# Patient Record
Sex: FEMALE | Race: WHITE | NOT HISPANIC OR LATINO | Employment: FULL TIME | URBAN - METROPOLITAN AREA
[De-identification: names, ages, dates, MRNs, and addresses within clinical notes are randomized per-mention and may not be internally consistent; named-entity substitution may affect disease eponyms.]

---

## 2017-02-15 ENCOUNTER — TELEPHONE (OUTPATIENT)
Dept: FAMILY MEDICINE | Facility: CLINIC | Age: 57
End: 2017-02-15

## 2017-02-15 DIAGNOSIS — Z00.00 PREVENTATIVE HEALTH CARE: ICD-10-CM

## 2017-02-15 DIAGNOSIS — Z12.31 ENCOUNTER FOR SCREENING MAMMOGRAM FOR MALIGNANT NEOPLASM OF BREAST: Primary | ICD-10-CM

## 2017-02-15 NOTE — TELEPHONE ENCOUNTER
----- Message from Lawanda Gonzalez sent at 2/15/2017  9:34 AM CST -----  Contact: Patient   Patient is coming in on 3/13/17 for her Annual and she would like to have orders in for a Mammogram and labs to be done before her appointment. Please call her at [phone].      Thanks  Td

## 2017-02-20 ENCOUNTER — LAB VISIT (OUTPATIENT)
Dept: LAB | Facility: HOSPITAL | Age: 57
End: 2017-02-20
Attending: FAMILY MEDICINE
Payer: COMMERCIAL

## 2017-02-20 ENCOUNTER — OFFICE VISIT (OUTPATIENT)
Dept: FAMILY MEDICINE | Facility: CLINIC | Age: 57
End: 2017-02-20
Payer: COMMERCIAL

## 2017-02-20 VITALS
TEMPERATURE: 97 F | DIASTOLIC BLOOD PRESSURE: 74 MMHG | RESPIRATION RATE: 18 BRPM | BODY MASS INDEX: 29.58 KG/M2 | OXYGEN SATURATION: 98 % | SYSTOLIC BLOOD PRESSURE: 120 MMHG | HEART RATE: 78 BPM | WEIGHT: 184.06 LBS | HEIGHT: 66 IN

## 2017-02-20 DIAGNOSIS — E78.2 MIXED HYPERLIPIDEMIA: ICD-10-CM

## 2017-02-20 DIAGNOSIS — F31.70 BIPOLAR DISORDER IN FULL REMISSION, MOST RECENT EPISODE UNSPECIFIED TYPE: ICD-10-CM

## 2017-02-20 DIAGNOSIS — Z12.4 CERVICAL CANCER SCREENING: ICD-10-CM

## 2017-02-20 DIAGNOSIS — Z00.00 PREVENTATIVE HEALTH CARE: Primary | ICD-10-CM

## 2017-02-20 DIAGNOSIS — Z00.00 PREVENTATIVE HEALTH CARE: ICD-10-CM

## 2017-02-20 DIAGNOSIS — K21.9 GASTROESOPHAGEAL REFLUX DISEASE, ESOPHAGITIS PRESENCE NOT SPECIFIED: ICD-10-CM

## 2017-02-20 DIAGNOSIS — Z23 NEEDS FLU SHOT: ICD-10-CM

## 2017-02-20 DIAGNOSIS — F41.1 GENERALIZED ANXIETY DISORDER: ICD-10-CM

## 2017-02-20 LAB
ALBUMIN SERPL BCP-MCNC: 3.8 G/DL
ALP SERPL-CCNC: 93 U/L
ALT SERPL W/O P-5'-P-CCNC: 20 U/L
ANION GAP SERPL CALC-SCNC: 8 MMOL/L
AST SERPL-CCNC: 26 U/L
BASOPHILS # BLD AUTO: 0.03 K/UL
BASOPHILS NFR BLD: 0.6 %
BILIRUB SERPL-MCNC: 0.4 MG/DL
BUN SERPL-MCNC: 9 MG/DL
CALCIUM SERPL-MCNC: 9.3 MG/DL
CHLORIDE SERPL-SCNC: 107 MMOL/L
CHOLEST/HDLC SERPL: 4.8 {RATIO}
CO2 SERPL-SCNC: 27 MMOL/L
CREAT SERPL-MCNC: 0.8 MG/DL
DIFFERENTIAL METHOD: ABNORMAL
EOSINOPHIL # BLD AUTO: 0.1 K/UL
EOSINOPHIL NFR BLD: 2.7 %
ERYTHROCYTE [DISTWIDTH] IN BLOOD BY AUTOMATED COUNT: 15.7 %
EST. GFR  (AFRICAN AMERICAN): >60 ML/MIN/1.73 M^2
EST. GFR  (NON AFRICAN AMERICAN): >60 ML/MIN/1.73 M^2
GLUCOSE SERPL-MCNC: 75 MG/DL
HCT VFR BLD AUTO: 38.6 %
HDL/CHOLESTEROL RATIO: 21 %
HDLC SERPL-MCNC: 224 MG/DL
HDLC SERPL-MCNC: 47 MG/DL
HGB BLD-MCNC: 12.4 G/DL
LDLC SERPL CALC-MCNC: 158.8 MG/DL
LYMPHOCYTES # BLD AUTO: 1.5 K/UL
LYMPHOCYTES NFR BLD: 31.8 %
MCH RBC QN AUTO: 28.1 PG
MCHC RBC AUTO-ENTMCNC: 32.1 %
MCV RBC AUTO: 88 FL
MONOCYTES # BLD AUTO: 0.4 K/UL
MONOCYTES NFR BLD: 7.3 %
NEUTROPHILS # BLD AUTO: 2.7 K/UL
NEUTROPHILS NFR BLD: 57.4 %
NONHDLC SERPL-MCNC: 177 MG/DL
PLATELET # BLD AUTO: 371 K/UL
PMV BLD AUTO: 10.2 FL
POTASSIUM SERPL-SCNC: 4 MMOL/L
PROT SERPL-MCNC: 7.5 G/DL
RBC # BLD AUTO: 4.41 M/UL
SODIUM SERPL-SCNC: 142 MMOL/L
TRIGL SERPL-MCNC: 91 MG/DL
TSH SERPL DL<=0.005 MIU/L-ACNC: 3.09 UIU/ML
WBC # BLD AUTO: 4.78 K/UL

## 2017-02-20 PROCEDURE — 90688 IIV4 VACCINE SPLT 0.5 ML IM: CPT | Mod: S$GLB,,, | Performed by: FAMILY MEDICINE

## 2017-02-20 PROCEDURE — 80061 LIPID PANEL: CPT

## 2017-02-20 PROCEDURE — 99999 PR PBB SHADOW E&M-EST. PATIENT-LVL IV: CPT | Mod: PBBFAC,,, | Performed by: FAMILY MEDICINE

## 2017-02-20 PROCEDURE — 88175 CYTOPATH C/V AUTO FLUID REDO: CPT

## 2017-02-20 PROCEDURE — 99396 PREV VISIT EST AGE 40-64: CPT | Mod: 25,S$GLB,, | Performed by: FAMILY MEDICINE

## 2017-02-20 PROCEDURE — 84443 ASSAY THYROID STIM HORMONE: CPT

## 2017-02-20 PROCEDURE — 36415 COLL VENOUS BLD VENIPUNCTURE: CPT | Mod: PO

## 2017-02-20 PROCEDURE — 80053 COMPREHEN METABOLIC PANEL: CPT

## 2017-02-20 PROCEDURE — 90471 IMMUNIZATION ADMIN: CPT | Mod: S$GLB,,, | Performed by: FAMILY MEDICINE

## 2017-02-20 PROCEDURE — 85025 COMPLETE CBC W/AUTO DIFF WBC: CPT

## 2017-02-20 NOTE — MR AVS SNAPSHOT
[facility]  [address]  [place] [address]  Phone: [phone]                  June Flor Rodney   2017 8:00 AM   Office Visit    Description:  Female : 1960   Provider:  June Kelley MD   Department:  [facility]           Reason for Visit     Annual Exam           Diagnoses this Visit        Comments    Preventative health care    -  Primary     Mixed hyperlipidemia         Gastroesophageal reflux disease, esophagitis presence not specified         Generalized anxiety disorder         Bipolar disorder in full remission, most recent episode unspecified type         Cervical cancer screening                To Do List           Future Appointments        Provider Department Dept Phone    2017 9:00 AM LABORATORY, [address] [facility] [phone]    3/6/2017 1:00 PM ON MAMMO1 [facility] [phone]      Goals (5 Years of Data)     None      [facility]     [facility] Care [facility] -  Assistance  Registered nurses in the [facility] provide clinical advisement, health education, appointment booking, and other advisory services.  Call for this free service at [phone].             Medications           Message regarding Medications     Verify the changes and/or additions to your medication regime listed below are the same as discussed with your clinician today.  If any of these changes or additions are incorrect, please notify your healthcare provider.        STOP taking these medications     meloxicam (MOBIC) 15 MG tablet Take 1 tablet by mouth once daily.           Verify that the below list of medications is an accurate representation of the medications you are currently taking.  If none reported, the list may be blank. If incorrect, please contact your healthcare provider. Carry this list with you in case of emergency.           Current Medications      "desonide (DESOWEN) 0.05 % lotion Apply topically 2 (two) times daily.    fluoxetine (PROZAC) 20 MG capsule Take 20 mg by mouth every morning.    lamotrigine (LAMICTAL) 150 MG Tab     pantoprazole (PROTONIX) 40 MG tablet take 1 tablet by mouth every morning AT LEAST 30 MINUTES PRIOR TO EATING MEAL    quetiapine (SEROQUEL) 25 MG Tab            Clinical Reference Information           Your Vitals Were     BP Pulse Temp Resp Height Weight    120/74 78 97.1 °F (36.2 °C) (Tympanic) 18 5' 6" (1.676 m) 83.5 kg (184 lb 1.4 oz)    Last Period SpO2 BMI          07/29/2010 (Approximate) 98% 29.71 kg/m2        Blood Pressure          Most Recent Value    BP  120/74      Allergies as of 2/20/2017     Demerol  [Meperidine]      Immunizations Administered on Date of Encounter - 2/20/2017     None      Orders Placed During Today's Visit      Normal Orders This Visit    Liquid-based pap smear, screening       Language Assistance Services     ATTENTION: Language assistance services are available, free of charge. Please call [phone].      ATENCIÓN: Si habla silvana, tiene a rodriguez disposición servicios gratuitos de asistencia lingüística. Llame al [phone].     RENEA Ý: N?u b?n nói Ti?ng Vi?t, có các d?ch v? h? tr? ngôn ng? mi?n phí dành cho b?n. G?i s? [phone].         [facility] complies with applicable Federal civil rights laws and does not discriminate on the basis of race, color, national origin, age, disability, or sex.        "

## 2017-02-20 NOTE — PROGRESS NOTES
CHIEF COMPLAINT: This is a 57-year-old female here for preventive health exam.    SUBJECTIVE: The patient is doing well without complaints.  She's lost 30 pounds over the last year through a health program associated with her place of employment.  She is exercising regularly.  Patient takes pantoprazole for GERD and Seroquel, Lamictal and Prozac for bipolar disorder.    Eye exam August 2015. Mammogram November 2015. Pap smear September 2014, due again in September 2017. Colonoscopy August 2011, due again in August 2021.  Tdap October 2015.     ROS:  GENERAL: Patient denies fever, chills, night sweats. Patient denies weight gain. Patient denies anorexia, fatigue, weakness or swollen glands.  SKIN: Patient denies rash or hair loss.  HEENT: Patient denies sore throat, ear pain, hearing loss, nasal congestion, or runny nose. Patient denies visual disturbance, eye irritation or discharge.  LUNGS: Patient denies wheeze or hemoptysis.  CARDIOVASCULAR: Patient denies chest pain, shortness of breath, palpitations, syncope or lower extremity edema.  GI: Patient denies abdominal pain, nausea, vomiting, diarrhea, constipation, blood in stool or melena.  GENITOURINARY: Patient denies pelvic pain, vaginal discharge, itch or odor. Patient denies irregular vaginal bleeding. Patient denies dysuria, frequency, hematuria, nocturia, urgency or incontinence.  BREASTS: Patient denies breast pain, mass or nipple discharge.  MUSCULOSKELETAL: Patient denies joint pain, swelling, redness or warmth.  NEUROLOGIC: Patient denies headache, vertigo, paresthesias, weakness in limb, dysarthria, dysphagia or abnormality of gait.  PSYCHIATRIC: Patient denies anxiety, depression, or memory loss.     OBJECTIVE:   GENERAL: Well-developed well-nourished obese white female alert and oriented x3, in no acute distress. Memory, judgment and cognition without deficit.  Weight loss of 30 pounds in the last year.  SKIN: Clear without rash. Normal color and  tone.  HEENT: Eyes: Clear conjunctivae. No scleral icterus. Pupils equal reactive to light and accommodation. Ears: Clear canals. Clear TMs. Nose: Without congestion. Pharynx: Without injection or exudates.  NECK: Supple, normal range of motion. No masses, lymphadenopathy or enlarged thyroid. No JVD. Carotids 2+ and equal. No bruits.  LUNGS: Clear to auscultation. Normal respiratory effort.  CARDIOVASCULAR: Regular rhythm, normal S1, S2 without murmur, gallop or rub.  BACK: No CVA or spinal tenderness.  BREASTS: No masses or nipple discharge. Tenderness to palpation right greater than left breast.  ABDOMEN: Normal appearance. Active bowel sounds. Soft, nontender without mass or organomegaly. No rebound or guarding.  EXTREMITIES: Without cyanosis, clubbing or edema. Distal pulses 2+ and equal. Normal range of motion in all extremities. No joint effusion, erythema or warmth.  NEUROLOGIC: Cranial nerves II through XII without deficit. Motor strength equal bilaterally. Sensation normal to touch. Deep tendon reflexes 2+ and equal. Gait without abnormality. No tremor. Negative cerebellar signs.  PELVIC: External: Without lesions or inflammation. Vaginal: Clear. Cervix: Normal appearance, nontender.  Pap ×2. Uterus: Normal size and shape. Adnexa: Non-tender, without masses. Rectovaginal: Confirms, heme-negative stool x2.    ASSESSMENT:  1. Preventative health care    2. Mixed hyperlipidemia    3. Gastroesophageal reflux disease, esophagitis presence not specified    4. Generalized anxiety disorder    5. Bipolar disorder in full remission, most recent episode unspecified type    6. Cervical cancer screening      PLAN:   1.  Weight reduction.  Exercise regularly.  2.  Age-appropriate counseling.  3.  Fasting lab.  4.  Mammogram.  5.  Refill medications as needed.  6.  Influenza vaccine.

## 2017-03-06 ENCOUNTER — HOSPITAL ENCOUNTER (OUTPATIENT)
Dept: RADIOLOGY | Facility: HOSPITAL | Age: 57
Discharge: HOME | End: 2017-03-06
Attending: FAMILY MEDICINE
Payer: COMMERCIAL

## 2017-03-06 DIAGNOSIS — Z12.31 ENCOUNTER FOR SCREENING MAMMOGRAM FOR MALIGNANT NEOPLASM OF BREAST: ICD-10-CM

## 2017-03-06 PROCEDURE — 77063 BREAST TOMOSYNTHESIS BI: CPT | Mod: 26,,, | Performed by: RADIOLOGY

## 2017-03-06 PROCEDURE — 77067 SCR MAMMO BI INCL CAD: CPT | Mod: TC

## 2017-03-06 PROCEDURE — 77067 SCR MAMMO BI INCL CAD: CPT | Mod: 26,,, | Performed by: RADIOLOGY

## 2017-06-13 ENCOUNTER — TELEPHONE (OUTPATIENT)
Dept: FAMILY MEDICINE | Facility: CLINIC | Age: 57
End: 2017-06-13

## 2017-06-13 DIAGNOSIS — H35.30 MACULAR DEGENERATION: Primary | ICD-10-CM

## 2017-06-13 NOTE — TELEPHONE ENCOUNTER
----- Message from Richy Peoples sent at 6/13/2017  8:21 AM CDT -----  Contact: pt  She's calling in regards to a referral to see the ophthalmologist, please advise, [phone] (home)

## 2017-06-19 ENCOUNTER — OFFICE VISIT (OUTPATIENT)
Dept: OPHTHALMOLOGY | Facility: CLINIC | Age: 57
End: 2017-06-19
Payer: COMMERCIAL

## 2017-06-19 DIAGNOSIS — H52.4 BILATERAL PRESBYOPIA: ICD-10-CM

## 2017-06-19 DIAGNOSIS — Z98.890 S/P LASIK (LASER ASSISTED IN SITU KERATOMILEUSIS) OF BOTH EYES: ICD-10-CM

## 2017-06-19 PROCEDURE — 92015 DETERMINE REFRACTIVE STATE: CPT | Mod: S$GLB,,, | Performed by: OPTOMETRIST

## 2017-06-19 PROCEDURE — 92014 COMPRE OPH EXAM EST PT 1/>: CPT | Mod: S$GLB,,, | Performed by: OPTOMETRIST

## 2017-06-19 PROCEDURE — 99999 PR PBB SHADOW E&M-EST. PATIENT-LVL I: CPT | Mod: PBBFAC,,, | Performed by: OPTOMETRIST

## 2017-06-19 NOTE — LETTER
June 19, 2017      June Kelley MD  [address]  [facility] [address]           [facility]  [address] [place] [address]  Phone: [phone]  Fax: [phone]          Patient: June Stevens   MR Number: [id number]   YOB: 1960   Date of Visit: 6/19/2017       Dear Dr. June Kelley:    Thank you for referring June Stevens to me for evaluation. Attached you will find relevant portions of my assessment and plan of care.    If you have questions, please do not hesitate to call me. I look forward to following June Stevens along with you.    Sincerely,    Ruben Han, OD    Enclosure  CC:  No Recipients    If you would like to receive this communication electronically, please contact [email or web address] or [phone] to request more information on [employer] Link access.    For providers and/or their staff who would like to refer a patient to [facility], please contact us through our one-stop-shop provider referral line, Tono Mccarty, at [phone].    If you feel you have received this communication in error or would no longer like to receive these types of communications, please e-mail [email or web address]

## 2017-06-19 NOTE — PROGRESS NOTES
HPI     Decrease distance visual acuity. Patient wears OTC readers. Last eye exam   08/03/2015 TRF. Update glasses RX.    Last edited by María Ya on 6/19/2017  1:53 PM. (History)            Assessment /Plan     For exam results, see Encounter Report.    Nuclear cataract, bilateral    S/P LASIK (laser assisted in situ keratomileusis) of both eyes    Bilateral presbyopia      Minimal cataracts OU, not surgical    Stable LASIK, some Rx regression OU    Dispense Final Rx for glasses.  RTC 1 year

## 2017-07-24 RX ORDER — PANTOPRAZOLE SODIUM 40 MG/1
TABLET, DELAYED RELEASE ORAL
Qty: 30 TABLET | Refills: 5 | Status: SHIPPED | OUTPATIENT
Start: 2017-07-24 | End: 2018-01-08 | Stop reason: SDUPTHER

## 2017-10-02 ENCOUNTER — IMMUNIZATION (OUTPATIENT)
Dept: FAMILY MEDICINE | Facility: CLINIC | Age: 57
End: 2017-10-02
Payer: COMMERCIAL

## 2017-10-02 ENCOUNTER — TELEPHONE (OUTPATIENT)
Dept: FAMILY MEDICINE | Facility: CLINIC | Age: 57
End: 2017-10-02

## 2017-10-02 PROCEDURE — 90471 IMMUNIZATION ADMIN: CPT | Mod: S$GLB,,, | Performed by: FAMILY MEDICINE

## 2017-10-02 PROCEDURE — 99999 PR PBB SHADOW E&M-EST. PATIENT-LVL I: CPT | Mod: PBBFAC,,,

## 2017-10-02 PROCEDURE — 90686 IIV4 VACC NO PRSV 0.5 ML IM: CPT | Mod: S$GLB,,, | Performed by: FAMILY MEDICINE

## 2017-11-16 ENCOUNTER — TELEPHONE (OUTPATIENT)
Dept: FAMILY MEDICINE | Facility: CLINIC | Age: 57
End: 2017-11-16

## 2017-11-16 ENCOUNTER — OFFICE VISIT (OUTPATIENT)
Dept: FAMILY MEDICINE | Facility: CLINIC | Age: 57
End: 2017-11-16
Payer: COMMERCIAL

## 2017-11-16 VITALS
HEIGHT: 66 IN | WEIGHT: 191.81 LBS | TEMPERATURE: 97 F | BODY MASS INDEX: 30.82 KG/M2 | DIASTOLIC BLOOD PRESSURE: 80 MMHG | RESPIRATION RATE: 18 BRPM | OXYGEN SATURATION: 99 % | SYSTOLIC BLOOD PRESSURE: 110 MMHG | HEART RATE: 79 BPM

## 2017-11-16 DIAGNOSIS — J06.9 ACUTE URI: Primary | ICD-10-CM

## 2017-11-16 PROCEDURE — 99999 PR PBB SHADOW E&M-EST. PATIENT-LVL III: CPT | Mod: PBBFAC,,, | Performed by: FAMILY MEDICINE

## 2017-11-16 PROCEDURE — 99213 OFFICE O/P EST LOW 20 MIN: CPT | Mod: S$GLB,,, | Performed by: FAMILY MEDICINE

## 2017-11-16 RX ORDER — LURASIDONE HYDROCHLORIDE 40 MG/1
40 TABLET, FILM COATED ORAL NIGHTLY
Refills: 0 | COMMUNITY
Start: 2017-10-17 | End: 2018-05-23

## 2017-11-16 RX ORDER — METHYLPREDNISOLONE 4 MG/1
TABLET ORAL
Qty: 1 PACKAGE | Refills: 0 | Status: SHIPPED | OUTPATIENT
Start: 2017-11-16 | End: 2017-11-30

## 2017-11-16 NOTE — PROGRESS NOTES
CHIEF COMPLAINT: This is a 57-year-old female complaining of respiratory illness.      SUBJECTIVE: Patient complains of a 5 day history of nasal congestion, sore throat, postnasal drip and dry cough.  She complains of fatigue, weakness and anorexia.  She denies chest congestion, shortness breath or wheezing.  Patient felt that she initially had a fever, but did not take her temperature.  Positive ill contacts.  She's been taking NyQuil and Mucinex.    ROS:  GENERAL: Patient denies chills, night sweats.  Patient denies weight gain or loss. Patient denies anorexia, fatigue, weakness or swollen glands.  SKIN: Patient denies rash.  HEENT:.  Patient denies ear pain, hearing loss, visual disturbance, eye irritation or discharge.  LUNGS: Patient denies wheeze or hemoptysis.  CARDIOVASCULAR: Patient denies chest pain, shortness of breath, palpitations, syncope or lower extremity edema.  GI: Patient denies abdominal pain, nausea, vomiting, diarrhea, constipation, blood in stool or melena.    OBJECTIVE:   GENERAL: Well-developed well-nourished, mildly ill-appearing obese white female alert and oriented x3 in no acute distress.  Memory, judgment and cognition without deficit.  No audible wheezing.  SKIN: Clear without rash.  Normal color and tone.  HEENT: Eyes: Clear conjunctivae.  No scleral icterus.  Ears: Clear canals.  Clear TMs.  Nose: Mild bilateral congestion.  Pharynx: Without injection or exudates.  NECK: Supple, normal range of motion.  No lymphadenopathy.    LUNGS: Clear to auscultation.  Normal respiratory effort.  CARDIOVASCULAR: Regular rhythm, normal S1, S2 without murmur, gallop or rub.    ASSESSMENT:  1. Acute URI      PLAN:   1.  Medrol Dosepak.  2.  Increase fluid intake.  3.  OTC meds as needed.  4.  Follow-up if no improvement or worsening symptoms.

## 2017-11-30 ENCOUNTER — OFFICE VISIT (OUTPATIENT)
Dept: FAMILY MEDICINE | Facility: CLINIC | Age: 57
End: 2017-11-30
Payer: COMMERCIAL

## 2017-11-30 VITALS
HEIGHT: 66 IN | DIASTOLIC BLOOD PRESSURE: 82 MMHG | TEMPERATURE: 98 F | OXYGEN SATURATION: 98 % | HEART RATE: 104 BPM | RESPIRATION RATE: 18 BRPM | WEIGHT: 189.81 LBS | BODY MASS INDEX: 30.51 KG/M2 | SYSTOLIC BLOOD PRESSURE: 116 MMHG

## 2017-11-30 DIAGNOSIS — B96.89 BACTERIAL SINUSITIS: Primary | ICD-10-CM

## 2017-11-30 DIAGNOSIS — J32.9 BACTERIAL SINUSITIS: Primary | ICD-10-CM

## 2017-11-30 PROCEDURE — 99213 OFFICE O/P EST LOW 20 MIN: CPT | Mod: S$GLB,,, | Performed by: FAMILY MEDICINE

## 2017-11-30 PROCEDURE — 99999 PR PBB SHADOW E&M-EST. PATIENT-LVL III: CPT | Mod: PBBFAC,,, | Performed by: FAMILY MEDICINE

## 2017-11-30 RX ORDER — AMOXICILLIN 875 MG/1
875 TABLET, FILM COATED ORAL 2 TIMES DAILY
Qty: 20 TABLET | Refills: 0 | Status: SHIPPED | OUTPATIENT
Start: 2017-11-30 | End: 2017-12-10

## 2017-11-30 RX ORDER — PROMETHAZINE HYDROCHLORIDE AND DEXTROMETHORPHAN HYDROBROMIDE 6.25; 15 MG/5ML; MG/5ML
5 SYRUP ORAL
Qty: 180 ML | Refills: 0 | Status: SHIPPED | OUTPATIENT
Start: 2017-11-30 | End: 2017-12-10

## 2017-11-30 NOTE — LETTER
November 30, 2017      [facility]  [address] [address]  Phone: [phone]       Patient: June Stevens   YOB: 1960  Date of Visit: 11/30/2017    To Whom It May Concern:    Reta Stevens  was at [facility] on 11/30/2017. She may return to work/school on 12/4/17 with no restrictions. If you have any questions or concerns, or if I can be of further assistance, please do not hesitate to contact me.    Sincerely,    June Kelley MD

## 2017-11-30 NOTE — PROGRESS NOTES
CHIEF COMPLAINT: This is a 57-year-old female complaining of recurrent respiratory symptoms.    SUBJECTIVE: Patient was seen 2 weeks ago and treated with Medrol Dosepak.  She had no symptoms for 1 week.  Then 48 hours ago she began having cough, runny nose and sore throat.  Sore throat has resolved but she now has slight cough and frontal headache and death.  He nose.  She complains of chest congestion but denies shortness breath or wheezing.  She's been taking DayQuil and cetirizine.  She denies ill contacts.    ROS:  GENERAL: Patient denies fever, chills, night sweats.  Patient denies weight gain or loss. Patient denies anorexia, weakness or swollen glands.  Positive for fatigue.  SKIN: Patient denies rash.  HEENT: Patient denies ear pain, hearing loss, visual disturbance, eye irritation or discharge.  LUNGS: Patient denies wheeze or hemoptysis.  CARDIOVASCULAR: Patient denies chest pain, shortness of breath, palpitations, syncope or lower extremity edema.  GI: Patient denies abdominal pain, nausea, vomiting, diarrhea, constipation, blood in stool or melena.     OBJECTIVE:   GENERAL: Well-developed well-nourished, lethargic obese white female alert and oriented x3 in no acute distress.  Memory, judgment and cognition without deficit.  No audible wheezing.  SKIN: Clear without rash.  Normal color and tone.  HEENT: Eyes: Clear conjunctivae.  No scleral icterus.  Ears: Clear canals.  Clear TMs.  Nose: Mild bilateral congestion.  Pharynx: Without injection or exudates.  NECK: Supple, normal range of motion.  No lymphadenopathy.    LUNGS: Clear to auscultation.  Normal respiratory effort.  CARDIOVASCULAR: Regular rhythm, normal S1, S2 without murmur, gallop or rub.    ASSESSMENT:  Acute rhinosinusitis    PLAN:   1.  Amoxicillin 875 mg twice daily for 10 days.  2.  Phenergan DM 6 ounces.  3.  Symptomatic treatment.  4.  Increase fluid intake.  5.  Follow-up if no improvement or worsening symptoms.

## 2018-01-08 RX ORDER — PANTOPRAZOLE SODIUM 40 MG/1
TABLET, DELAYED RELEASE ORAL
Qty: 30 TABLET | Refills: 0 | Status: SHIPPED | OUTPATIENT
Start: 2018-01-08 | End: 2018-05-28 | Stop reason: SDUPTHER

## 2018-05-23 ENCOUNTER — OFFICE VISIT (OUTPATIENT)
Dept: FAMILY MEDICINE | Facility: CLINIC | Age: 58
End: 2018-05-23
Payer: COMMERCIAL

## 2018-05-23 ENCOUNTER — TELEPHONE (OUTPATIENT)
Dept: FAMILY MEDICINE | Facility: CLINIC | Age: 58
End: 2018-05-23

## 2018-05-23 VITALS
WEIGHT: 211.19 LBS | HEIGHT: 66 IN | SYSTOLIC BLOOD PRESSURE: 118 MMHG | RESPIRATION RATE: 18 BRPM | DIASTOLIC BLOOD PRESSURE: 76 MMHG | HEART RATE: 95 BPM | TEMPERATURE: 98 F | BODY MASS INDEX: 33.94 KG/M2

## 2018-05-23 DIAGNOSIS — S61.211A LACERATION OF LEFT INDEX FINGER WITHOUT DAMAGE TO NAIL, FOREIGN BODY PRESENCE UNSPECIFIED, INITIAL ENCOUNTER: Primary | ICD-10-CM

## 2018-05-23 PROCEDURE — 99213 OFFICE O/P EST LOW 20 MIN: CPT | Mod: S$GLB,,, | Performed by: REGISTERED NURSE

## 2018-05-23 PROCEDURE — 99999 PR PBB SHADOW E&M-EST. PATIENT-LVL III: CPT | Mod: PBBFAC,,, | Performed by: REGISTERED NURSE

## 2018-05-23 PROCEDURE — 3008F BODY MASS INDEX DOCD: CPT | Mod: CPTII,S$GLB,, | Performed by: REGISTERED NURSE

## 2018-05-23 NOTE — PROGRESS NOTES
"Subjective:       Patient ID: June Stevens is a 58 y.o. female.    Chief Complaint: Laceration      HPI    Mrs. Stevens is here today with reports of cutting her LT index finger with a serrated kitchen knife last night.  She was using the knife to open a package she had received in the mail.  Knife slipped, cutting the pad of the finger.  Did bleed a lot with severe pain into today.  The bleeding has stopped, finger now with mild throbbing.  She has cleaned the finger but avoids looking at wound.  She is UTD on her tetanus vaccine.      Review of Systems  See HPI.      Patient Active Problem List   Diagnosis    Bipolar disorder    Generalized anxiety disorder    Obesity    Mixed hyperlipidemia    GERD (gastroesophageal reflux disease)       Past medical, surgical, family and social histories have been reviewed today.        Objective:     Vitals:    05/23/18 1402   BP: 118/76   Pulse: 95   Resp: 18   Temp: 97.8 °F (36.6 °C)   TempSrc: Tympanic   Weight: 95.8 kg (211 lb 3.2 oz)   Height: 5' 6" (1.676 m)   PainSc: 10-Worst pain ever   PainLoc: Finger       Physical Exam   Constitutional: She is oriented to person, place, and time. She appears well-developed and well-nourished.   Musculoskeletal:        Left hand: She exhibits laceration (superficial laceration noted to the finger pad of the LT index finger//no active bleeding or drainage//area tender with mild swelling//good cap refill < 3 sec//full ROM of finger, sensation intact).        Hands:  Neurological: She is alert and oriented to person, place, and time.   Skin: Skin is warm and dry. Capillary refill takes less than 2 seconds.   Psychiatric: She has a normal mood and affect. Her behavior is normal. Judgment and thought content normal.   Vitals reviewed.        Medication List with Changes/Refills   Current Medications    FLUOXETINE (PROZAC) 20 MG CAPSULE    Take 20 mg by mouth every morning.    LAMOTRIGINE (LAMICTAL) 150 MG TAB        PANTOPRAZOLE " (PROTONIX) 40 MG TABLET    take 1 tablet by mouth every morning AT LEAST 30 MINUTES BEFORE EATING A MEAL    QUETIAPINE (SEROQUEL) 25 MG TAB       Discontinued Medications    LATUDA 40 MG TAB TABLET    Take 40 mg by mouth every evening.           Diagnosis       1. Laceration of left index finger without damage to nail, foreign body presence unspecified, initial encounter          Assessment/ Plan     Laceration of left index finger without foreign body without damage to nail, initial encounter  · PT with reports of laceration to finger, no s/s infection.  · Too late for sutures (likely not needed anyway) and steri-strips not necessary.  · Wound care discussed.  · Wound cleaned today, bandaged.  · Tetanus vaccine UTD.  · Follow-up in clinic as needed.        GUY Lyles  [facility]

## 2018-05-23 NOTE — TELEPHONE ENCOUNTER
----- Message from Daniel Molina sent at 5/23/2018 10:24 AM CDT -----  Contact: pt  Call pt at [phone] pt cut finger and wants to see if she can be worked in today

## 2018-05-28 RX ORDER — PANTOPRAZOLE SODIUM 40 MG/1
TABLET, DELAYED RELEASE ORAL
Qty: 30 TABLET | Refills: 0 | Status: SHIPPED | OUTPATIENT
Start: 2018-05-28 | End: 2018-08-13 | Stop reason: SDUPTHER

## 2018-07-12 RX ORDER — PANTOPRAZOLE SODIUM 40 MG/1
TABLET, DELAYED RELEASE ORAL
Qty: 30 TABLET | Refills: 0 | OUTPATIENT
Start: 2018-07-12

## 2018-07-20 ENCOUNTER — TELEPHONE (OUTPATIENT)
Dept: FAMILY MEDICINE | Facility: CLINIC | Age: 58
End: 2018-07-20

## 2018-08-13 ENCOUNTER — OFFICE VISIT (OUTPATIENT)
Dept: FAMILY MEDICINE | Facility: CLINIC | Age: 58
End: 2018-08-13
Payer: COMMERCIAL

## 2018-08-13 VITALS
RESPIRATION RATE: 18 BRPM | HEIGHT: 66 IN | OXYGEN SATURATION: 98 % | DIASTOLIC BLOOD PRESSURE: 80 MMHG | BODY MASS INDEX: 34.15 KG/M2 | HEART RATE: 91 BPM | TEMPERATURE: 99 F | SYSTOLIC BLOOD PRESSURE: 110 MMHG | WEIGHT: 212.5 LBS

## 2018-08-13 DIAGNOSIS — E78.2 MIXED HYPERLIPIDEMIA: ICD-10-CM

## 2018-08-13 DIAGNOSIS — Z00.00 PREVENTATIVE HEALTH CARE: Primary | ICD-10-CM

## 2018-08-13 DIAGNOSIS — F31.70 BIPOLAR DISORDER IN FULL REMISSION, MOST RECENT EPISODE UNSPECIFIED TYPE: ICD-10-CM

## 2018-08-13 DIAGNOSIS — K21.9 GASTROESOPHAGEAL REFLUX DISEASE, ESOPHAGITIS PRESENCE NOT SPECIFIED: ICD-10-CM

## 2018-08-13 DIAGNOSIS — Z12.31 ENCOUNTER FOR SCREENING MAMMOGRAM FOR MALIGNANT NEOPLASM OF BREAST: ICD-10-CM

## 2018-08-13 PROCEDURE — 99999 PR PBB SHADOW E&M-EST. PATIENT-LVL IV: CPT | Mod: PBBFAC,,, | Performed by: FAMILY MEDICINE

## 2018-08-13 PROCEDURE — 99396 PREV VISIT EST AGE 40-64: CPT | Mod: S$GLB,,, | Performed by: FAMILY MEDICINE

## 2018-08-13 RX ORDER — PANTOPRAZOLE SODIUM 40 MG/1
TABLET, DELAYED RELEASE ORAL
Qty: 30 TABLET | Refills: 11 | Status: SHIPPED | OUTPATIENT
Start: 2018-08-13 | End: 2018-08-15 | Stop reason: SDUPTHER

## 2018-08-13 NOTE — PROGRESS NOTES
CHIEF COMPLAINT:  This is a 58-year-old female here for preventive health exam.    SUBJECTIVE: The patient is doing well without complaints.  She has gained back the weight she lost through the healthcare program she qualified for at work.  She plans to re-enroll.  Patient takes pantoprazole for GERD and Seroquel, Lamictal and Prozac for bipolar disorder.     Eye exam August 2017. Mammogram  March 2017.  Pap smear February 2017, due again in February 2020. Colonoscopy August 2011, due again in August 2021.  Tdap October 2015.  Flu vaccine October 2017.     ROS:  GENERAL: Patient denies fever, chills, night sweats. Patient denies weight loss.  Patient denies anorexia, fatigue, weakness or swollen glands.  SKIN: Patient denies rash or hair loss.  HEENT: Patient denies sore throat, ear pain, hearing loss, nasal congestion, or runny nose. Patient denies visual disturbance, eye irritation or discharge.  LUNGS: Patient denies wheeze or hemoptysis.  CARDIOVASCULAR: Patient denies chest pain, shortness of breath, palpitations, syncope or lower extremity edema.  GI: Patient denies abdominal pain, nausea, vomiting, diarrhea, constipation, blood in stool or melena.  GENITOURINARY: Patient denies pelvic pain, vaginal discharge, itch or odor. Patient denies irregular vaginal bleeding. Patient denies dysuria, frequency, hematuria, nocturia, urgency or incontinence.  BREASTS: Patient denies breast pain, mass or nipple discharge.  MUSCULOSKELETAL: Patient denies joint pain, swelling, redness or warmth.  NEUROLOGIC: Patient denies headache, vertigo, paresthesias, weakness in limb, dysarthria, dysphagia or abnormality of gait.  PSYCHIATRIC: Patient denies anxiety, depression, or memory loss.     OBJECTIVE:   GENERAL: Well-developed well-nourished obese white female alert and oriented x3, in no acute distress. Memory, judgment and cognition without deficit.   SKIN: Clear without rash. Normal color and tone.  HEENT: Eyes: Clear  conjunctivae. No scleral icterus. Pupils equal reactive to light and accommodation. Ears: Clear canals. Clear TMs. Nose: Without congestion. Pharynx: Without injection or exudates.  NECK: Supple, normal range of motion. No masses, lymphadenopathy or enlarged thyroid. No JVD. Carotids 2+ and equal. No bruits.  LUNGS: Clear to auscultation. Normal respiratory effort.  CARDIOVASCULAR: Regular rhythm, normal S1, S2 without murmur, gallop or rub.  BACK: No CVA or spinal tenderness.  BREASTS: No masses or nipple discharge. Tenderness to palpation right greater than left breast.  ABDOMEN: Normal appearance. Active bowel sounds. Soft, nontender without mass or organomegaly. No rebound or guarding.  EXTREMITIES: Without cyanosis, clubbing or edema. Distal pulses 2+ and equal. Normal range of motion in all extremities. No joint effusion, erythema or warmth.  NEUROLOGIC: Cranial nerves II through XII without deficit. Motor strength equal bilaterally. Sensation normal to touch. Deep tendon reflexes 2+ and equal. Gait without abnormality. No tremor. Negative cerebellar signs.  PELVIC: External: Without lesions or inflammation. Vaginal: Clear. Cervix: Normal appearance, nontender. No Pap.  Uterus: Normal size and shape. Adnexa: Non-tender, without masses. Rectovaginal: Confirms, heme-negative stool x2.     ASSESSMENT:  1. Preventative health care    2. Bipolar disorder in full remission, most recent episode unspecified type    3. Gastroesophageal reflux disease, esophagitis presence not specified    4. Mixed hyperlipidemia    5. Encounter for screening mammogram for malignant neoplasm of breast      PLAN:   1.  Weight reduction.  Exercise regularly.  2.  Age-appropriate counseling.  3.  Fasting lab.  4.  Mammogram.  5.  Refill pantoprazole x1 year.

## 2018-08-15 RX ORDER — PANTOPRAZOLE SODIUM 40 MG/1
TABLET, DELAYED RELEASE ORAL
Qty: 30 TABLET | Refills: 11 | Status: SHIPPED | OUTPATIENT
Start: 2018-08-15 | End: 2019-11-03 | Stop reason: SDUPTHER

## 2018-08-20 ENCOUNTER — LAB VISIT (OUTPATIENT)
Dept: LAB | Facility: HOSPITAL | Age: 58
End: 2018-08-20
Attending: FAMILY MEDICINE
Payer: COMMERCIAL

## 2018-08-20 DIAGNOSIS — Z00.00 PREVENTATIVE HEALTH CARE: ICD-10-CM

## 2018-08-20 LAB
ALBUMIN SERPL BCP-MCNC: 3.7 G/DL
ALP SERPL-CCNC: 96 U/L
ALT SERPL W/O P-5'-P-CCNC: 17 U/L
ANION GAP SERPL CALC-SCNC: 9 MMOL/L
AST SERPL-CCNC: 19 U/L
BASOPHILS # BLD AUTO: 0.04 K/UL
BASOPHILS NFR BLD: 0.8 %
BILIRUB SERPL-MCNC: 0.4 MG/DL
BUN SERPL-MCNC: 9 MG/DL
CALCIUM SERPL-MCNC: 9.2 MG/DL
CHLORIDE SERPL-SCNC: 109 MMOL/L
CHOLEST SERPL-MCNC: 221 MG/DL
CHOLEST/HDLC SERPL: 5 {RATIO}
CO2 SERPL-SCNC: 24 MMOL/L
CREAT SERPL-MCNC: 0.8 MG/DL
DIFFERENTIAL METHOD: ABNORMAL
EOSINOPHIL # BLD AUTO: 0.2 K/UL
EOSINOPHIL NFR BLD: 4.2 %
ERYTHROCYTE [DISTWIDTH] IN BLOOD BY AUTOMATED COUNT: 15 %
EST. GFR  (AFRICAN AMERICAN): >60 ML/MIN/1.73 M^2
EST. GFR  (NON AFRICAN AMERICAN): >60 ML/MIN/1.73 M^2
GLUCOSE SERPL-MCNC: 86 MG/DL
HCT VFR BLD AUTO: 38.2 %
HDLC SERPL-MCNC: 44 MG/DL
HDLC SERPL: 19.9 %
HGB BLD-MCNC: 11.7 G/DL
IMM GRANULOCYTES # BLD AUTO: 0.02 K/UL
IMM GRANULOCYTES NFR BLD AUTO: 0.4 %
LDLC SERPL CALC-MCNC: 148.6 MG/DL
LYMPHOCYTES # BLD AUTO: 1.7 K/UL
LYMPHOCYTES NFR BLD: 31.3 %
MCH RBC QN AUTO: 27.4 PG
MCHC RBC AUTO-ENTMCNC: 30.6 G/DL
MCV RBC AUTO: 90 FL
MONOCYTES # BLD AUTO: 0.5 K/UL
MONOCYTES NFR BLD: 9.2 %
NEUTROPHILS # BLD AUTO: 2.9 K/UL
NEUTROPHILS NFR BLD: 54.1 %
NONHDLC SERPL-MCNC: 177 MG/DL
NRBC BLD-RTO: 0 /100 WBC
PLATELET # BLD AUTO: 336 K/UL
PMV BLD AUTO: 10.5 FL
POTASSIUM SERPL-SCNC: 4.4 MMOL/L
PROT SERPL-MCNC: 7.3 G/DL
RBC # BLD AUTO: 4.27 M/UL
SODIUM SERPL-SCNC: 142 MMOL/L
TRIGL SERPL-MCNC: 142 MG/DL
TSH SERPL DL<=0.005 MIU/L-ACNC: 1.98 UIU/ML
WBC # BLD AUTO: 5.3 K/UL

## 2018-08-20 PROCEDURE — 80053 COMPREHEN METABOLIC PANEL: CPT

## 2018-08-20 PROCEDURE — 85025 COMPLETE CBC W/AUTO DIFF WBC: CPT

## 2018-08-20 PROCEDURE — 36415 COLL VENOUS BLD VENIPUNCTURE: CPT | Mod: PO

## 2018-08-20 PROCEDURE — 84443 ASSAY THYROID STIM HORMONE: CPT

## 2018-08-20 PROCEDURE — 80061 LIPID PANEL: CPT

## 2018-10-15 ENCOUNTER — HOSPITAL ENCOUNTER (OUTPATIENT)
Dept: RADIOLOGY | Facility: HOSPITAL | Age: 58
Discharge: HOME | End: 2018-10-15
Attending: FAMILY MEDICINE
Payer: COMMERCIAL

## 2018-10-15 VITALS — WEIGHT: 212 LBS | HEIGHT: 66 IN | BODY MASS INDEX: 34.07 KG/M2

## 2018-10-15 DIAGNOSIS — Z12.31 ENCOUNTER FOR SCREENING MAMMOGRAM FOR MALIGNANT NEOPLASM OF BREAST: ICD-10-CM

## 2018-10-15 PROCEDURE — 77067 SCR MAMMO BI INCL CAD: CPT | Mod: TC

## 2018-10-15 PROCEDURE — 77067 SCR MAMMO BI INCL CAD: CPT | Mod: 26,,, | Performed by: RADIOLOGY

## 2018-10-15 PROCEDURE — 77063 BREAST TOMOSYNTHESIS BI: CPT | Mod: TC

## 2018-10-15 PROCEDURE — 77063 BREAST TOMOSYNTHESIS BI: CPT | Mod: 26,,, | Performed by: RADIOLOGY

## 2018-11-05 ENCOUNTER — IMMUNIZATION (OUTPATIENT)
Dept: FAMILY MEDICINE | Facility: CLINIC | Age: 58
End: 2018-11-05
Payer: COMMERCIAL

## 2018-11-05 PROCEDURE — 90471 IMMUNIZATION ADMIN: CPT | Mod: S$GLB,,, | Performed by: FAMILY MEDICINE

## 2018-11-05 PROCEDURE — 90686 IIV4 VACC NO PRSV 0.5 ML IM: CPT | Mod: S$GLB,,, | Performed by: FAMILY MEDICINE

## 2019-06-25 ENCOUNTER — TELEPHONE (OUTPATIENT)
Dept: FAMILY MEDICINE | Facility: CLINIC | Age: 59
End: 2019-06-25

## 2019-06-25 DIAGNOSIS — Z23 NEED FOR VACCINATION: Primary | ICD-10-CM

## 2019-08-12 ENCOUNTER — OFFICE VISIT (OUTPATIENT)
Dept: FAMILY MEDICINE | Facility: CLINIC | Age: 59
End: 2019-08-12
Payer: COMMERCIAL

## 2019-08-12 VITALS
BODY MASS INDEX: 32.34 KG/M2 | TEMPERATURE: 98 F | HEART RATE: 113 BPM | DIASTOLIC BLOOD PRESSURE: 80 MMHG | RESPIRATION RATE: 18 BRPM | SYSTOLIC BLOOD PRESSURE: 116 MMHG | OXYGEN SATURATION: 97 % | HEIGHT: 66 IN | WEIGHT: 201.25 LBS

## 2019-08-12 DIAGNOSIS — R50.9 RESPIRATORY ILLNESS WITH FEVER: Primary | ICD-10-CM

## 2019-08-12 DIAGNOSIS — J98.9 RESPIRATORY ILLNESS WITH FEVER: Primary | ICD-10-CM

## 2019-08-12 LAB
CTP QC/QA: YES
POC MOLECULAR INFLUENZA A AGN: NEGATIVE
POC MOLECULAR INFLUENZA B AGN: NEGATIVE

## 2019-08-12 PROCEDURE — 87502 POCT INFLUENZA A/B MOLECULAR: ICD-10-PCS | Mod: QW,S$GLB,, | Performed by: FAMILY MEDICINE

## 2019-08-12 PROCEDURE — 99999 PR PBB SHADOW E&M-EST. PATIENT-LVL III: ICD-10-PCS | Mod: PBBFAC,,, | Performed by: FAMILY MEDICINE

## 2019-08-12 PROCEDURE — 99213 PR OFFICE/OUTPT VISIT, EST, LEVL III, 20-29 MIN: ICD-10-PCS | Mod: S$GLB,,, | Performed by: FAMILY MEDICINE

## 2019-08-12 PROCEDURE — 87502 INFLUENZA DNA AMP PROBE: CPT | Mod: QW,S$GLB,, | Performed by: FAMILY MEDICINE

## 2019-08-12 PROCEDURE — 3008F PR BODY MASS INDEX (BMI) DOCUMENTED: ICD-10-PCS | Mod: CPTII,S$GLB,, | Performed by: FAMILY MEDICINE

## 2019-08-12 PROCEDURE — 99213 OFFICE O/P EST LOW 20 MIN: CPT | Mod: S$GLB,,, | Performed by: FAMILY MEDICINE

## 2019-08-12 PROCEDURE — 3008F BODY MASS INDEX DOCD: CPT | Mod: CPTII,S$GLB,, | Performed by: FAMILY MEDICINE

## 2019-08-12 PROCEDURE — 99999 PR PBB SHADOW E&M-EST. PATIENT-LVL III: CPT | Mod: PBBFAC,,, | Performed by: FAMILY MEDICINE

## 2019-08-12 RX ORDER — PROMETHAZINE HYDROCHLORIDE AND CODEINE PHOSPHATE 6.25; 1 MG/5ML; MG/5ML
5 SOLUTION ORAL EVERY 4 HOURS PRN
Qty: 180 ML | Refills: 0 | Status: SHIPPED | OUTPATIENT
Start: 2019-08-12 | End: 2019-08-22

## 2019-08-12 NOTE — PROGRESS NOTES
CHIEF COMPLAINT:  This is a 59-year-old female complaining of respiratory illness.    SUBJECTIVE:  The patient complains of a 5 day history of weakness, fever and body aches.  She started coughing 48 hr ago.  Cough is nonproductive.  She has slightly stuffy nose.  She has had fever to 102.2°.  She complains of chest pain with coughing and shortness of breath at times.  She denies sore throat, earache, runny nose or wheezing.  Negative ill contacts.  She has been taking NyQuil and Robitussin DM without relief.    ROS:  GENERAL: Patient denies chills, night sweats.  Patient denies weight gain or loss. Patient denies anorexia, fatigue, weakness or swollen glands.  SKIN: Patient denies rash.  HEENT:.  Patient denies ear pain, hearing loss, visual disturbance, eye irritation or discharge.  LUNGS: Patient denies wheeze or hemoptysis.  CARDIOVASCULAR: Patient denies chest pain, shortness of breath, palpitations, syncope or lower extremity edema.  GI: Patient denies abdominal pain, nausea, vomiting, diarrhea, constipation, blood in stool or melena.     OBJECTIVE:   GENERAL: Well-developed well-nourished, mildly ill-appearing obese white female alert and oriented x3 in no acute distress.  Memory, judgment and cognition without deficit.  No audible wheezing.  SKIN: Clear without rash.  Normal color and tone.  HEENT: Eyes: Clear conjunctivae.  No scleral icterus.  Ears: Clear canals.  Clear TMs.  Nose: Mild bilateral congestion.  Pharynx: Without injection or exudates.  NECK: Supple, normal range of motion.  No lymphadenopathy.    LUNGS: Clear to auscultation.  Normal respiratory effort.  CARDIOVASCULAR: Regular rhythm, normal S1, S2 without murmur, gallop or rub.    Rapid flu test:  Negative.    ASSESSMENT:  1. Respiratory illness with fever      PLAN:   1.  Symptomatic treatment.  2.  Increase fluid intake.  3.  Phenergan with codeine 6 oz.  4.  Work excuse given.  5.  Follow-up if no improvement or worsening  symptoms.    This note is generated with speech recognition software and is subject to transcription error and sound alike phrases that may be missed by proofreading.

## 2019-09-18 ENCOUNTER — PATIENT MESSAGE (OUTPATIENT)
Dept: FAMILY MEDICINE | Facility: CLINIC | Age: 59
End: 2019-09-18

## 2019-09-18 RX ORDER — QUETIAPINE FUMARATE 25 MG/1
25 TABLET, FILM COATED ORAL NIGHTLY
Qty: 30 TABLET | Refills: 2 | Status: SHIPPED | OUTPATIENT
Start: 2019-09-18 | End: 2020-01-02

## 2019-09-18 NOTE — TELEPHONE ENCOUNTER
Next annual: 11/18/19 scheduled with patient    Message from the patient:  German Kelley.  Could you send in a refill for my Seroquel.  I am out and I cannot afford to see my psychiatrist for $400 an hour anymore.  She doesnt take my insurance.    Thanks in advance, Basia     -Patient prefers generic (quetiapine), Pharmacy verified ([facility] [address] [place]

## 2019-10-21 ENCOUNTER — INITIAL CONSULT (OUTPATIENT)
Dept: PSYCHIATRY | Facility: CLINIC | Age: 59
End: 2019-10-21
Payer: COMMERCIAL

## 2019-10-21 DIAGNOSIS — Z63.9 RELATIONSHIP PROBLEMS: ICD-10-CM

## 2019-10-21 DIAGNOSIS — F31.32 BIPOLAR 1 DISORDER, DEPRESSED, MODERATE: Primary | ICD-10-CM

## 2019-10-21 DIAGNOSIS — F41.9 ANXIETY DISORDER, UNSPECIFIED TYPE: ICD-10-CM

## 2019-10-21 DIAGNOSIS — F10.10 ALCOHOL USE DISORDER, MILD, ABUSE: ICD-10-CM

## 2019-10-21 PROCEDURE — 90791 PR PSYCHIATRIC DIAGNOSTIC EVALUATION: ICD-10-PCS | Mod: S$GLB,,, | Performed by: SOCIAL WORKER

## 2019-10-21 PROCEDURE — 90791 PSYCH DIAGNOSTIC EVALUATION: CPT | Mod: S$GLB,,, | Performed by: SOCIAL WORKER

## 2019-10-21 SDOH — SOCIAL DETERMINANTS OF HEALTH (SDOH): PROBLEM RELATED TO PRIMARY SUPPORT GROUP, UNSPECIFIED: Z63.9

## 2019-10-21 NOTE — PROGRESS NOTES
"Psychiatry Initial Visit (PhD/LCSW)  Diagnostic Interview - CPT 51160    Date: 10/21/2019    Site: [facility]    Referral source:  Self-referred    Clinical status of patient: Outpatient    June Stevens, a 59 y.o. female, for initial evaluation visit.  Met with patient.    Chief complaint/reason for encounter: addictive disorder, depression, mood swings, anxiety, behavior and interpersonal    History of present illness:  59 year old  female patient, identifying by her middle name, Basia, presented for initial evaluation, reporting chief complaint of "my partner thought since I am getting medication, I ought probably to have some talk therapy as well."  Patient described a history of mood swings and recurrent depression going back to her teenage years, a recurrent struggle with anger and angry outbursts, and poor coping skills, relying too much on binge drinking (less than weekly, but always to a point of heavy intoxication) to blot out anger and anxiety.  She described a fragile self-esteem, pattern of defensiveness, heightened irritability, and frequent depressive cycles.  She believes she has abandonment issues from her parents having left for [place] abruptly when she was just [age] years old, leaving her and her younger sister with a grandmother.  Mom returned alone after one year but never fully embodied a parental figure to the patient.  Her grandmother, who represented stability,  when the patient was 27.  She recalls mood issues going back to [employer] age, age 12 or 13.  She said that was when social issues at school started to become more difficult as she and those around her started noticing she was not conforming to stereotypical feminine norms and came to be suspected of being a lesbian.  She described an emotional struggle between her Tenriism beliefs and growing awareness of her same-sex orientation.  She said she tried to date boys.  She reported a lot of peer rejection, being " "known as "a alcides lesbian," and she attempted suicide at age 17, stepping out in front of an oncoming car. By age 19 she had been introduced to the bhandari bar scene, which she experienced as a crucial affirmation that she is not alone, but which met with her mother's disapproval; it took her mother years to become more accepting.  She described her current living situation as a split between two households, one with her partner of 25 years, who is older and "very controlling," and the other with the patient's mother.  She reports tension in her primary relationship that involves infidelity on her part back in 2009, an affair stemming from a high school reunion.  She said her partner now polices her, and she feels resentful but also does not want to leave her partner over it.  The patient denied any suicidal or homicidal ideation, though endorsing remote history of a suicidal episode as a teenager.  Denied any cognitive deficit, psychosis, mood swings, or recreational drug use.  Endorsed past struggle with some alcohol abuse, a period of abstinence with AA participation, and now periodic binges.  Endorses struggle with explosive anger.  Identified therapeutic goals include reduction of depression and anxiety, improved self-esteem, and coping skills, including anger management, assertiveness communication skills.       Pain: noncontributory    Symptoms:   · Mood: depressed mood, diminished interest and worthlessness/guilt  · Anxiety: excessive anxiety/worry, restlessness/keyed up and irritability  · Substance abuse: substance tolerance and take more than intended  · Cognitive functioning: denied  · Health behaviors: some alcohol binges    Psychiatric history: psychotropic management by PCP and prior suicide attempt(s); no chemical abuse treatment reported but did attend AA for years and had a sponsor; now relapsed with a binge pattern of use.    Medical history: noncontributory    Family history of psychiatric illness: " "father bipolar; sister with clinical anxiety    Social history (marriage, employment, etc.): See history above; oldest of 3; younger brother drowned as an infant.  Younger sister is her closest friend.  Chaotic childhood; parents were very young; left patient and younger sister with grandmother when she was 4; father never returned; mother came back after one year.  Grandmother, an aunt, and uncle were parental figures.  Patient described growing social tension in school in middle school and high school, related to her budding identity as a masculine lesbian.  Reported running away from school and home several times in teens.  Attempted suicide at 17.  Discovered bhandari bars at 19 and found the start of a sort of community.   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  Current committed relationship of the past 25 years; described partner as controling, domineering, but "she takes car of me."  Endorsed tension of patient's past infidelity a decade ago.  Previous longest relationship was for 5 years.  No biological children of her own.  Never in a relationship with a man.  Loves animals; finds them easier to relate to than people.  Currently lives between two houses, one with her partner, the other with her mother.      Substance use:   Alcohol: binge pattern of abuse; denied any known health damage from alcohol   Drugs: none   Tobacco: none   Caffeine: average of 2 and a half cups of coffee daily    Current medications and drug reactions (include OTC, herbal): see medication list      Strengths and liabilities: Strength: Patient accepts guidance/feedback, Strength: Patient is motivated for change., Strength: Patient is physically healthy., Liability: Patient is impulsive., Liability: Patient lacks coping skills.    Current Evaluation:     Mental Status Exam:  General Appearance:  unremarkable, age appropriate, normal weight, casually dressed   Speech: normal tone, normal rate, " normal pitch, normal volume      Level of Cooperation: cooperative      Thought Processes: goal-directed   Mood: anxious, depressed      Thought Content: normal, no suicidality, no homicidality, delusions, or paranoia   Affect: congruent and appropriate   Orientation: Oriented x3   Memory: recent and remote memory intact   Attention Span & Concentration: intact   Fund of General Knowledge: intact and appropriate to age and level of education   Abstract Reasoning: not formally assessed   Judgment & Insight: limited     Language  intact     Diagnostic Impression - Plan:       ICD-10-CM ICD-9-CM   1. Bipolar 1 disorder, depressed, moderate F31.32 296.52   2. Anxiety disorder, unspecified type F41.9 300.00   3. Alcohol use disorder, mild, abuse F10.10 305.00   4. Relationship problems Z63.9 313.3       Plan:individual psychotherapy and medication management by physician    Return to Clinic: as scheduled, 11/4/19    Length of Service (minutes): 45

## 2019-11-03 RX ORDER — PANTOPRAZOLE SODIUM 40 MG/1
TABLET, DELAYED RELEASE ORAL
Qty: 30 TABLET | Refills: 0 | Status: SHIPPED | OUTPATIENT
Start: 2019-11-03 | End: 2019-12-03 | Stop reason: SDUPTHER

## 2019-11-04 ENCOUNTER — OFFICE VISIT (OUTPATIENT)
Dept: PSYCHIATRY | Facility: CLINIC | Age: 59
End: 2019-11-04
Payer: COMMERCIAL

## 2019-11-04 DIAGNOSIS — Z63.9 RELATIONSHIP PROBLEMS: ICD-10-CM

## 2019-11-04 DIAGNOSIS — F41.9 ANXIETY DISORDER, UNSPECIFIED TYPE: ICD-10-CM

## 2019-11-04 DIAGNOSIS — F31.9 BIPOLAR 1 DISORDER, DEPRESSED: Primary | ICD-10-CM

## 2019-11-04 DIAGNOSIS — F10.10 ALCOHOL USE DISORDER, MILD, ABUSE: ICD-10-CM

## 2019-11-04 PROCEDURE — 90834 PSYTX W PT 45 MINUTES: CPT | Mod: S$GLB,,, | Performed by: SOCIAL WORKER

## 2019-11-04 PROCEDURE — 90834 PR PSYCHOTHERAPY W/PATIENT, 45 MIN: ICD-10-PCS | Mod: S$GLB,,, | Performed by: SOCIAL WORKER

## 2019-11-04 SDOH — SOCIAL DETERMINANTS OF HEALTH (SDOH): PROBLEM RELATED TO PRIMARY SUPPORT GROUP, UNSPECIFIED: Z63.9

## 2019-11-18 ENCOUNTER — LAB VISIT (OUTPATIENT)
Dept: LAB | Facility: HOSPITAL | Age: 59
End: 2019-11-18
Payer: COMMERCIAL

## 2019-11-18 ENCOUNTER — OFFICE VISIT (OUTPATIENT)
Dept: FAMILY MEDICINE | Facility: CLINIC | Age: 59
End: 2019-11-18
Payer: COMMERCIAL

## 2019-11-18 ENCOUNTER — OFFICE VISIT (OUTPATIENT)
Dept: PSYCHIATRY | Facility: CLINIC | Age: 59
End: 2019-11-18
Payer: COMMERCIAL

## 2019-11-18 VITALS
SYSTOLIC BLOOD PRESSURE: 120 MMHG | TEMPERATURE: 98 F | DIASTOLIC BLOOD PRESSURE: 76 MMHG | RESPIRATION RATE: 18 BRPM | BODY MASS INDEX: 33.03 KG/M2 | HEART RATE: 99 BPM | OXYGEN SATURATION: 97 % | HEIGHT: 66 IN | WEIGHT: 205.5 LBS

## 2019-11-18 DIAGNOSIS — S60.551A SUPERFICIAL FOREIGN BODY OF RIGHT HAND, INITIAL ENCOUNTER: ICD-10-CM

## 2019-11-18 DIAGNOSIS — K21.9 GASTROESOPHAGEAL REFLUX DISEASE, ESOPHAGITIS PRESENCE NOT SPECIFIED: ICD-10-CM

## 2019-11-18 DIAGNOSIS — Z23 NEED FOR VACCINATION: ICD-10-CM

## 2019-11-18 DIAGNOSIS — Z12.31 ENCOUNTER FOR SCREENING MAMMOGRAM FOR MALIGNANT NEOPLASM OF BREAST: ICD-10-CM

## 2019-11-18 DIAGNOSIS — J34.89 NASAL VESTIBULITIS: ICD-10-CM

## 2019-11-18 DIAGNOSIS — F31.9 BIPOLAR I DISORDER, CURRENT EPISODE DEPRESSED: Primary | ICD-10-CM

## 2019-11-18 DIAGNOSIS — E78.2 MIXED HYPERLIPIDEMIA: ICD-10-CM

## 2019-11-18 DIAGNOSIS — Z12.4 CERVICAL CANCER SCREENING: ICD-10-CM

## 2019-11-18 DIAGNOSIS — Z00.00 PREVENTATIVE HEALTH CARE: Primary | ICD-10-CM

## 2019-11-18 DIAGNOSIS — E66.9 OBESITY (BMI 30.0-34.9): ICD-10-CM

## 2019-11-18 DIAGNOSIS — F31.70 BIPOLAR DISORDER IN FULL REMISSION, MOST RECENT EPISODE UNSPECIFIED TYPE: ICD-10-CM

## 2019-11-18 DIAGNOSIS — Z00.00 PREVENTATIVE HEALTH CARE: ICD-10-CM

## 2019-11-18 DIAGNOSIS — Z63.9 RELATIONSHIP PROBLEMS: ICD-10-CM

## 2019-11-18 DIAGNOSIS — F41.1 ANXIETY STATES: ICD-10-CM

## 2019-11-18 LAB
ALBUMIN SERPL BCP-MCNC: 3.8 G/DL (ref 3.5–5.2)
ALP SERPL-CCNC: 108 U/L (ref 55–135)
ALT SERPL W/O P-5'-P-CCNC: 17 U/L (ref 10–44)
ANION GAP SERPL CALC-SCNC: 8 MMOL/L (ref 8–16)
AST SERPL-CCNC: 17 U/L (ref 10–40)
BASOPHILS # BLD AUTO: 0.06 K/UL (ref 0–0.2)
BASOPHILS NFR BLD: 1.1 % (ref 0–1.9)
BILIRUB SERPL-MCNC: 0.4 MG/DL (ref 0.1–1)
BUN SERPL-MCNC: 11 MG/DL (ref 6–20)
CALCIUM SERPL-MCNC: 9.2 MG/DL (ref 8.7–10.5)
CHLORIDE SERPL-SCNC: 105 MMOL/L (ref 95–110)
CHOLEST SERPL-MCNC: 237 MG/DL (ref 120–199)
CHOLEST/HDLC SERPL: 4.5 {RATIO} (ref 2–5)
CO2 SERPL-SCNC: 30 MMOL/L (ref 23–29)
CREAT SERPL-MCNC: 0.8 MG/DL (ref 0.5–1.4)
DIFFERENTIAL METHOD: ABNORMAL
EOSINOPHIL # BLD AUTO: 0.2 K/UL (ref 0–0.5)
EOSINOPHIL NFR BLD: 3.6 % (ref 0–8)
ERYTHROCYTE [DISTWIDTH] IN BLOOD BY AUTOMATED COUNT: 14.8 % (ref 11.5–14.5)
EST. GFR  (AFRICAN AMERICAN): >60 ML/MIN/1.73 M^2
EST. GFR  (NON AFRICAN AMERICAN): >60 ML/MIN/1.73 M^2
GLUCOSE SERPL-MCNC: 82 MG/DL (ref 70–110)
HCT VFR BLD AUTO: 41.5 % (ref 37–48.5)
HDLC SERPL-MCNC: 53 MG/DL (ref 40–75)
HDLC SERPL: 22.4 % (ref 20–50)
HGB BLD-MCNC: 11.9 G/DL (ref 12–16)
IMM GRANULOCYTES # BLD AUTO: 0.01 K/UL (ref 0–0.04)
IMM GRANULOCYTES NFR BLD AUTO: 0.2 % (ref 0–0.5)
LDLC SERPL CALC-MCNC: 160.4 MG/DL (ref 63–159)
LYMPHOCYTES # BLD AUTO: 1.5 K/UL (ref 1–4.8)
LYMPHOCYTES NFR BLD: 26.8 % (ref 18–48)
MCH RBC QN AUTO: 25.9 PG (ref 27–31)
MCHC RBC AUTO-ENTMCNC: 28.7 G/DL (ref 32–36)
MCV RBC AUTO: 90 FL (ref 82–98)
MONOCYTES # BLD AUTO: 0.5 K/UL (ref 0.3–1)
MONOCYTES NFR BLD: 8.6 % (ref 4–15)
NEUTROPHILS # BLD AUTO: 3.3 K/UL (ref 1.8–7.7)
NEUTROPHILS NFR BLD: 59.7 % (ref 38–73)
NONHDLC SERPL-MCNC: 184 MG/DL
NRBC BLD-RTO: 0 /100 WBC
PLATELET # BLD AUTO: 363 K/UL (ref 150–350)
PMV BLD AUTO: 9.7 FL (ref 9.2–12.9)
POTASSIUM SERPL-SCNC: 4.3 MMOL/L (ref 3.5–5.1)
PROT SERPL-MCNC: 7.7 G/DL (ref 6–8.4)
RBC # BLD AUTO: 4.59 M/UL (ref 4–5.4)
SODIUM SERPL-SCNC: 143 MMOL/L (ref 136–145)
TRIGL SERPL-MCNC: 118 MG/DL (ref 30–150)
TSH SERPL DL<=0.005 MIU/L-ACNC: 2.1 UIU/ML (ref 0.4–4)
WBC # BLD AUTO: 5.48 K/UL (ref 3.9–12.7)

## 2019-11-18 PROCEDURE — 90471 IMMUNIZATION ADMIN: CPT | Mod: S$GLB,,, | Performed by: FAMILY MEDICINE

## 2019-11-18 PROCEDURE — 90834 PSYTX W PT 45 MINUTES: CPT | Mod: S$GLB,,, | Performed by: SOCIAL WORKER

## 2019-11-18 PROCEDURE — 90834 PR PSYCHOTHERAPY W/PATIENT, 45 MIN: ICD-10-PCS | Mod: S$GLB,,, | Performed by: SOCIAL WORKER

## 2019-11-18 PROCEDURE — 84443 ASSAY THYROID STIM HORMONE: CPT

## 2019-11-18 PROCEDURE — 80061 LIPID PANEL: CPT

## 2019-11-18 PROCEDURE — 88175 CYTOPATH C/V AUTO FLUID REDO: CPT

## 2019-11-18 PROCEDURE — 87624 HPV HI-RISK TYP POOLED RSLT: CPT

## 2019-11-18 PROCEDURE — 85025 COMPLETE CBC W/AUTO DIFF WBC: CPT

## 2019-11-18 PROCEDURE — 99999 PR PBB SHADOW E&M-EST. PATIENT-LVL III: ICD-10-PCS | Mod: PBBFAC,,, | Performed by: FAMILY MEDICINE

## 2019-11-18 PROCEDURE — 99396 PREV VISIT EST AGE 40-64: CPT | Mod: 25,S$GLB,, | Performed by: FAMILY MEDICINE

## 2019-11-18 PROCEDURE — 90686 IIV4 VACC NO PRSV 0.5 ML IM: CPT | Mod: S$GLB,,, | Performed by: FAMILY MEDICINE

## 2019-11-18 PROCEDURE — 99999 PR PBB SHADOW E&M-EST. PATIENT-LVL III: CPT | Mod: PBBFAC,,, | Performed by: FAMILY MEDICINE

## 2019-11-18 PROCEDURE — 36415 COLL VENOUS BLD VENIPUNCTURE: CPT | Mod: PO

## 2019-11-18 PROCEDURE — 10120 PR REMOVE FOREIGN BODY SIMPLE: ICD-10-PCS | Mod: S$GLB,,, | Performed by: FAMILY MEDICINE

## 2019-11-18 PROCEDURE — 10120 INC&RMVL FB SUBQ TISS SMPL: CPT | Mod: S$GLB,,, | Performed by: FAMILY MEDICINE

## 2019-11-18 PROCEDURE — 80053 COMPREHEN METABOLIC PANEL: CPT

## 2019-11-18 PROCEDURE — 90686 FLU VACCINE (QUAD) GREATER THAN OR EQUAL TO 3YO PRESERVATIVE FREE IM: ICD-10-PCS | Mod: S$GLB,,, | Performed by: FAMILY MEDICINE

## 2019-11-18 PROCEDURE — 99396 PR PREVENTIVE VISIT,EST,40-64: ICD-10-PCS | Mod: 25,S$GLB,, | Performed by: FAMILY MEDICINE

## 2019-11-18 PROCEDURE — 90471 FLU VACCINE (QUAD) GREATER THAN OR EQUAL TO 3YO PRESERVATIVE FREE IM: ICD-10-PCS | Mod: S$GLB,,, | Performed by: FAMILY MEDICINE

## 2019-11-18 RX ORDER — MUPIROCIN 20 MG/G
OINTMENT TOPICAL 2 TIMES DAILY
Qty: 1 TUBE | Refills: 1 | Status: SHIPPED | OUTPATIENT
Start: 2019-11-18 | End: 2020-12-08

## 2019-11-18 SDOH — SOCIAL DETERMINANTS OF HEALTH (SDOH): PROBLEM RELATED TO PRIMARY SUPPORT GROUP, UNSPECIFIED: Z63.9

## 2019-11-18 NOTE — PROGRESS NOTES
"Individual Psychotherapy (PhD/LCSW)    2019    Site:  [facility]         Therapeutic Intervention: Met with patient.  Outpatient - Insight oriented psychotherapy 45 min - CPT code 89663 and Outpatient - Supportive psychotherapy 45 min - CPT Code 21927    Chief complaint/reason for encounter: addictive disorder, depression, mood swings, anxiety and interpersonal     Interval history and content of current session:  59 year old female patient returned for her second session to address depression and mood swings, anxiety, self-medicating with alcohol, and interpersonal relationship stressors.  Patient identified problems with her temper and irritability as a recurrent major challenge for her.  Identified low self-esteem issues and abandonment issues in childhood.  Continues to reside between two homes, one with her life partner and the other with her mother.  Patient stating in the interim her partner has been supportive and encouraging.  Sometimes in conflict, but patient blames that in large part to her own impulsiveness and irritability.  Notes her partner has lately been defending her helpfully against mother's cutting criticisms and complaints.  Patient stated she loves her mother but finds her very difficult to deal with.  Mother is now 79 years old and "has no filter."  Mother unable to drive herself places, following two accidents, so the patient is largely stuck with that task and feels caught as a captive audience, or target, for her mother's biting comments.  Patient reported that since being back on medication she feels somewhat less aggravated by her mother's constant negativity and disrespect.  But still very aware of it; stated her mother appears to lack any sense of empathy whatsoever.  Patient is grieving a maternal uncle who  just last year.  She said he was actually a psychologist and was very supportive of her.  Patient endorsed some continued alcohol use; denied any problematic amount " consumed.  Stated she is consciously aiming to moderate her use.  Patient denied any si/hi, psychosis, cognitive deficits, or rages, despite endorsing elevated irritability and low frustration tolerance. Supportive therapy provided.  Plan is to follow up in clinic 11/18/19.       Treatment plan:  · Target symptoms: depression, anxiety , substance abuse, mood swings, grief, relationship tension  · Why chosen therapy is appropriate versus another modality: relevant to diagnosis, patient responds to this modality  · Outcome monitoring methods: self-report, observation  · Therapeutic intervention type: insight oriented psychotherapy, supportive psychotherapy    Risk parameters:  Patient reports no suicidal ideation  Patient reports no homicidal ideation  Patient reports no self-injurious behavior  Patient reports no violent behavior    Verbal deficits: None    Patient's response to intervention:  The patient's response to intervention is accepting.    Progress toward goals and other mental status changes:  The patient's progress toward goals is fair .    Diagnosis:     ICD-10-CM ICD-9-CM   1. Bipolar 1 disorder, depressed F31.9 296.50   2. Anxiety disorder, unspecified type F41.9 300.00   3. Alcohol use disorder, mild, abuse F10.10 305.00   4. Relationship problems Z63.9 313.3       Plan:  individual psychotherapy and medication management by physician    Return to clinic: as scheduled    Length of Service (minutes): 45
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
## 2019-11-18 NOTE — PROGRESS NOTES
VIS given. Patient tolerated injection well in left deltoid. I asked patient to wait in the clinic for 15 minutes before leaving to verify no adverse reactions. Patient verbally verified understanding

## 2019-11-22 ENCOUNTER — TELEPHONE (OUTPATIENT)
Dept: ADMINISTRATIVE | Facility: HOSPITAL | Age: 59
End: 2019-11-22

## 2019-11-22 LAB
HPV HR 12 DNA SPEC QL NAA+PROBE: NEGATIVE
HPV16 AG SPEC QL: NEGATIVE
HPV18 DNA SPEC QL NAA+PROBE: NEGATIVE

## 2019-11-29 LAB
FINAL PATHOLOGIC DIAGNOSIS: NORMAL
Lab: NORMAL

## 2019-12-02 ENCOUNTER — OFFICE VISIT (OUTPATIENT)
Dept: PSYCHIATRY | Facility: CLINIC | Age: 59
End: 2019-12-02
Payer: COMMERCIAL

## 2019-12-02 DIAGNOSIS — F41.1 ANXIETY STATE: ICD-10-CM

## 2019-12-02 DIAGNOSIS — Z63.9: ICD-10-CM

## 2019-12-02 DIAGNOSIS — F31.32: Primary | ICD-10-CM

## 2019-12-02 PROCEDURE — 90834 PR PSYCHOTHERAPY W/PATIENT, 45 MIN: ICD-10-PCS | Mod: S$GLB,,, | Performed by: SOCIAL WORKER

## 2019-12-02 PROCEDURE — 90834 PSYTX W PT 45 MINUTES: CPT | Mod: S$GLB,,, | Performed by: SOCIAL WORKER

## 2019-12-02 SDOH — SOCIAL DETERMINANTS OF HEALTH (SDOH): PROBLEM RELATED TO PRIMARY SUPPORT GROUP, UNSPECIFIED: Z63.9

## 2019-12-02 NOTE — PROGRESS NOTES
Individual Psychotherapy (PhD/LCSW)    11/18/2019    Site:  [facility]         Therapeutic Intervention: Met with patient.  Outpatient - Insight oriented psychotherapy 45 min - CPT code 74995 and Outpatient - Supportive psychotherapy 45 min - CPT Code 44301    Chief complaint/reason for encounter: addictive disorder, depression, mood swings, anxiety and interpersonal     Interval history and content of current session:  59 year old female patient returned for follow up psychotherapy session regarding issues of depression, mood swings, anxiety, and relationship stressors.  The patient presented alert and oriented, casually dressed, frozen coffee in-hand, smiling but reporting a lot of stress feelings.  She talked about her mother primarily, though started by saying her partner has been quite supportive and a calming factor for her lately, particularly regarding how she responds to her mother.  Patient lives between two households, one with her Partner, June, and the other with her mother.  She shares responsibilities for expenses in each.  Stated her mother was in need of financial support and so came to live with the patient.  She expressed primary stress in seeing that her mother is living beyond her means but stubbornly refuses to economize, and the patient's own finances therefore tied up with her mother by them residing together, are adversely affected.  Talked about the most recent stress example; the patient now trying to resolve an issue of her mother's over-$300 [employer] tv bill.  She described her mother's income as fixed and tiny, that bill alone representing nearly half of her monthly pay.  She has tried to talk to her about the tv bill, to which her mother responds that she is not doing without cable.  The patient indicated she has in the past paid her mother's bills to get her caught up.  We discussed what would happen if she stopped rescuing her mother financially, that there would be unpleasant  wake-up call consequences, but that her mother simply would lose her tv service whether she liked it or not.  She talked about her mother refusing to accept change, to which it was pointed out that [employer] itself represented change just a few years ago, a different way of obtaining television signal, so her mother is in fact capable of surviving change, even if she does not like it. Patient denied any si/hi, psychosis, cognitive deficits, or rages, despite endorsing elevated irritability and low frustration tolerance. Supportive therapy provided.  Plan is to follow up in clinic 12/2/19.       Treatment plan:  · Target symptoms: depression, anxiety , substance abuse, mood swings, grief, relationship tension  · Why chosen therapy is appropriate versus another modality: relevant to diagnosis, patient responds to this modality  · Outcome monitoring methods: self-report, observation  · Therapeutic intervention type: insight oriented psychotherapy, supportive psychotherapy    Risk parameters:  Patient reports no suicidal ideation  Patient reports no homicidal ideation  Patient reports no self-injurious behavior  Patient reports no violent behavior    Verbal deficits: None    Patient's response to intervention:  The patient's response to intervention is accepting.    Progress toward goals and other mental status changes:  The patient's progress toward goals is fair .    Diagnosis:     ICD-10-CM ICD-9-CM   1. Bipolar I disorder, current episode depressed F31.9 296.50   2. Relationship problems Z63.9 313.3   3. Anxiety states F41.1 300.00   History of Alcohol use disorder     Plan:  individual psychotherapy and medication management by physician    Return to clinic: as scheduled, 12/2/19    Length of Service (minutes): 45

## 2019-12-03 RX ORDER — PANTOPRAZOLE SODIUM 40 MG/1
TABLET, DELAYED RELEASE ORAL
Qty: 30 TABLET | Refills: 11 | Status: SHIPPED | OUTPATIENT
Start: 2019-12-03 | End: 2020-12-15 | Stop reason: SDUPTHER

## 2019-12-11 ENCOUNTER — TELEPHONE (OUTPATIENT)
Dept: ADMINISTRATIVE | Facility: HOSPITAL | Age: 59
End: 2019-12-11

## 2020-01-02 RX ORDER — QUETIAPINE FUMARATE 25 MG/1
TABLET, FILM COATED ORAL
Qty: 30 TABLET | Refills: 11 | Status: SHIPPED | OUTPATIENT
Start: 2020-01-02 | End: 2020-01-27

## 2020-01-06 ENCOUNTER — OFFICE VISIT (OUTPATIENT)
Dept: PSYCHIATRY | Facility: CLINIC | Age: 60
End: 2020-01-06
Payer: COMMERCIAL

## 2020-01-06 DIAGNOSIS — F41.1 ANXIETY STATE: ICD-10-CM

## 2020-01-06 DIAGNOSIS — F31.4 SEVERE BIPOLAR I DISORDER, CURRENT OR MOST RECENT EPISODE DEPRESSED: Primary | ICD-10-CM

## 2020-01-06 DIAGNOSIS — Z63.9: ICD-10-CM

## 2020-01-06 PROCEDURE — 90834 PR PSYCHOTHERAPY W/PATIENT, 45 MIN: ICD-10-PCS | Mod: S$GLB,,, | Performed by: SOCIAL WORKER

## 2020-01-06 PROCEDURE — 90834 PSYTX W PT 45 MINUTES: CPT | Mod: S$GLB,,, | Performed by: SOCIAL WORKER

## 2020-01-06 SDOH — SOCIAL DETERMINANTS OF HEALTH (SDOH): PROBLEM RELATED TO PRIMARY SUPPORT GROUP, UNSPECIFIED: Z63.9

## 2020-01-06 NOTE — PROGRESS NOTES
"Individual Psychotherapy (PhD/LCSW)    1/6/2020    [facility]         Therapeutic Intervention: Met with patient.  Outpatient - Insight oriented psychotherapy 45 min - CPT code 76494 and Outpatient - Supportive psychotherapy 45 min - CPT Code 92305    Chief complaint/reason for encounter: addictive disorder, depression, mood swings, anxiety and interpersonal     Interval history and content of current session:  59 year old female patient turning 60 on 1/7/20, returned for follow up psychotherapy session regarding issues of depression, mood swings, anxiety, and relationship stressors.  The patient presented alert and oriented, casually dressed.  Patient talked about the holidays and continued heightened anxiety she has been feeling.  Still finding the Prozac effective at suppressing excessive tearfulness, but the anxiety is a challenge.  She reported she has not spoken to her elderly mother the past 3 days, due to friction.  Her patient continues to live between two households, one with her long-time partner, the other with her mother.  She continues to subsidize her mother, who reportedly will not economize to live within her means.  The patient talked about several current examples of arguments between her mother and herself, saying her mother may possibly be developing some senility and seems to find things to argue about out of nowhere.  Current argument has been about particular strategies to look after some stray cats.  Patient reiterated her concern of her own history of occasional extreme irritability.  Discussed her endorsed difficulty asserting herself and the resultant "emotional explosions" from time to time.  Discussed boundaries and limit-setting and options for communication about boundaries, ie  "I" messages.  Patient denied any si/hi, psychosis, cognitive deficits, or rages, despite endorsing elevated irritability and low frustration tolerance. Supportive therapy provided.  Plan is to follow " up in clinic in 3 weeks.      Treatment plan:  · Target symptoms: depression, anxiety , substance abuse, mood swings, grief, relationship tension  · Why chosen therapy is appropriate versus another modality: relevant to diagnosis, patient responds to this modality  · Outcome monitoring methods: self-report, observation  · Therapeutic intervention type: insight oriented psychotherapy, supportive psychotherapy    Risk parameters:  Patient reports no suicidal ideation  Patient reports no homicidal ideation  Patient reports no self-injurious behavior  Patient reports no violent behavior    Verbal deficits: None    Patient's response to intervention:  The patient's response to intervention is accepting.    Progress toward goals and other mental status changes:  The patient's progress toward goals is mixed.    Diagnosis:     ICD-10-CM ICD-9-CM   1. Severe bipolar I disorder, current or most recent episode depressed F31.4 296.53   2. Anxiety state F41.1 300.00   3. Difficulty with parents Z63.9 V61.29   History of Alcohol use disorder     Plan:  individual psychotherapy and medication management by physician    Return to clinic: as scheduled, 12/2/19    Length of Service (minutes): 45

## 2020-01-06 NOTE — PROGRESS NOTES
Individual Psychotherapy (PhD/LCSW)    12/2/2019    Site:  [facility]         Therapeutic Intervention: Met with patient.  Outpatient - Insight oriented psychotherapy 45 min - CPT code 66208 and Outpatient - Supportive psychotherapy 45 min - CPT Code 85099    Chief complaint/reason for encounter: addictive disorder, depression, mood swings, anxiety and interpersonal     Interval history and content of current session:  Late entry for 12/2/19.  59 year old female patient returned for follow up psychotherapy session regarding issues of depression, mood swings, anxiety, and relationship stressors.  The patient presented alert and oriented, casually dressed.  Complimented the  staff for friendliness this morning.  Reported on her recent family get-together, calling it busy and heavily dominatd by football.  Said she was able to escape occasionally by volunteering to run errands and thereby get out of the relative's house for a while.  Reporting she continues to feel panicked often upon waking each morning, intense anxiety, worst on Mondays.  Confirming she has been on Prozac since 2014, which she endorsed has resolved crying issues but not visceral anxiety she feels often. Patient denied any si/hi, psychosis, cognitive deficits, or rages, despite endorsing elevated irritability and low frustration tolerance. Supportive therapy provided.  Plan is to follow up in clinic 1/6/20.      Treatment plan:  · Target symptoms: depression, anxiety , substance abuse, mood swings, grief, relationship tension  · Why chosen therapy is appropriate versus another modality: relevant to diagnosis, patient responds to this modality  · Outcome monitoring methods: self-report, observation  · Therapeutic intervention type: insight oriented psychotherapy, supportive psychotherapy    Risk parameters:  Patient reports no suicidal ideation  Patient reports no homicidal ideation  Patient reports no self-injurious behavior  Patient  reports no violent behavior    Verbal deficits: None    Patient's response to intervention:  The patient's response to intervention is accepting.    Progress toward goals and other mental status changes:  The patient's progress toward goals is fair .    Diagnosis:     ICD-10-CM ICD-9-CM   1. Bipolar I disorder, moderate, current or most recent episode depressed, with mixed features F31.32 296.52   2. Anxiety state F41.1 300.00   3. Difficulty with parents Z63.9 V61.29   History of Alcohol use disorder     Plan:  individual psychotherapy and medication management by physician    Return to clinic: as scheduled, 12/2/19    Length of Service (minutes): 45

## 2020-01-27 ENCOUNTER — OFFICE VISIT (OUTPATIENT)
Dept: PSYCHIATRY | Facility: CLINIC | Age: 60
End: 2020-01-27
Payer: COMMERCIAL

## 2020-01-27 ENCOUNTER — INITIAL CONSULT (OUTPATIENT)
Dept: PSYCHIATRY | Facility: CLINIC | Age: 60
End: 2020-01-27
Payer: COMMERCIAL

## 2020-01-27 VITALS
HEART RATE: 91 BPM | SYSTOLIC BLOOD PRESSURE: 110 MMHG | DIASTOLIC BLOOD PRESSURE: 72 MMHG | WEIGHT: 210.75 LBS | BODY MASS INDEX: 34.02 KG/M2

## 2020-01-27 DIAGNOSIS — F31.30 BIPOLAR I DISORDER, MOST RECENT EPISODE (OR CURRENT) DEPRESSED: Primary | ICD-10-CM

## 2020-01-27 DIAGNOSIS — F41.1 GAD (GENERALIZED ANXIETY DISORDER): ICD-10-CM

## 2020-01-27 DIAGNOSIS — F31.32 BIPOLAR AFFECTIVE DISORDER, CURRENTLY DEPRESSED, MODERATE: Primary | ICD-10-CM

## 2020-01-27 DIAGNOSIS — Z63.0 PARTNER RELATIONSHIP PROBLEMS: ICD-10-CM

## 2020-01-27 DIAGNOSIS — F41.1 GENERALIZED ANXIETY DISORDER: ICD-10-CM

## 2020-01-27 DIAGNOSIS — G47.00 INSOMNIA, UNSPECIFIED TYPE: ICD-10-CM

## 2020-01-27 PROCEDURE — 99999 PR PBB SHADOW E&M-EST. PATIENT-LVL II: CPT | Mod: PBBFAC,,, | Performed by: PSYCHIATRY & NEUROLOGY

## 2020-01-27 PROCEDURE — 90834 PSYTX W PT 45 MINUTES: CPT | Mod: S$GLB,,, | Performed by: SOCIAL WORKER

## 2020-01-27 PROCEDURE — 90792 PSYCH DIAG EVAL W/MED SRVCS: CPT | Mod: S$GLB,,, | Performed by: PSYCHIATRY & NEUROLOGY

## 2020-01-27 PROCEDURE — 99999 PR PBB SHADOW E&M-EST. PATIENT-LVL I: CPT | Mod: PBBFAC,,, | Performed by: SOCIAL WORKER

## 2020-01-27 PROCEDURE — 99999 PR PBB SHADOW E&M-EST. PATIENT-LVL I: ICD-10-PCS | Mod: PBBFAC,,, | Performed by: SOCIAL WORKER

## 2020-01-27 PROCEDURE — 90834 PR PSYCHOTHERAPY W/PATIENT, 45 MIN: ICD-10-PCS | Mod: S$GLB,,, | Performed by: SOCIAL WORKER

## 2020-01-27 PROCEDURE — 90792 PR PSYCHIATRIC DIAGNOSTIC EVALUATION W/MEDICAL SERVICES: ICD-10-PCS | Mod: S$GLB,,, | Performed by: PSYCHIATRY & NEUROLOGY

## 2020-01-27 PROCEDURE — 99999 PR PBB SHADOW E&M-EST. PATIENT-LVL II: ICD-10-PCS | Mod: PBBFAC,,, | Performed by: PSYCHIATRY & NEUROLOGY

## 2020-01-27 RX ORDER — BUSPIRONE HYDROCHLORIDE 30 MG/1
30 TABLET ORAL 2 TIMES DAILY
Qty: 60 TABLET | Refills: 2 | Status: SHIPPED | OUTPATIENT
Start: 2020-01-27 | End: 2020-04-21 | Stop reason: SDUPTHER

## 2020-01-27 RX ORDER — TRAZODONE HYDROCHLORIDE 100 MG/1
TABLET ORAL
Qty: 30 TABLET | Refills: 2 | Status: SHIPPED | OUTPATIENT
Start: 2020-01-27 | End: 2020-04-21 | Stop reason: SDUPTHER

## 2020-01-27 RX ORDER — LAMOTRIGINE 150 MG/1
150 TABLET ORAL DAILY
Qty: 30 TABLET | Refills: 2 | Status: SHIPPED | OUTPATIENT
Start: 2020-01-27 | End: 2020-04-21 | Stop reason: SDUPTHER

## 2020-01-27 SDOH — SOCIAL DETERMINANTS OF HEALTH (SDOH): PROBLEMS IN RELATIONSHIP WITH SPOUSE OR PARTNER: Z63.0

## 2020-01-27 NOTE — PROGRESS NOTES
"Outpatient Psychiatry Initial Visit (MD/NP)    2020    June Stevens, a 60 y.o. female, presenting for initial evaluation visit. Met with patient.    Reason for Encounter: Chief complaint/reason for encounter: addictive disorder, depression, mood swings, anxiety, behavior and interpersonal     History of present illness:  Patient is a 58 y/o F who presents for establishment of psychiatric care, previously evaluated by . Junito Costa. From his note:     reporting chief complaint of "my partner thought since I am getting medication, I ought probably to have some talk therapy as well." Pt described a history of mood swings & recurrent depression going back to her teenage years, a recurrent struggle with anger & angry outbursts, and poor coping skills, relying too much on binge drinking (less than weekly, but always to a point of heavy intoxication) to blot out anger & anxiety.  She described a fragile self-esteem, pattern of defensiveness, heightened irritability, & frequent depressive cycles. She believes she has abandonment issues from her parents having left for [place] abruptly when she was just 4 years old, leaving her & her younger sister with a grandmother. Mom returned alone after 1 year but never fully embodied a parental figure to the pt. Her grandmother, who represented stability,  when the patient was 27. She recalls mood issues going back to [employer] age, age 12 or 13. She said that was when social issues at school started to become more difficult as she & those around her started noticing she was not conforming to stereotypical feminine norms & came to be suspected of being a lesbian. She described an emotional struggle between her Moravian beliefs & growing awareness of her same-sex orientation. She said she tried to date boys. She reported a lot of peer rejection, being known as "a alcides lesbian," & she attempted suicide at age 17, stepping out in front of an oncoming car. By age " "19 she had been introduced to the bhandari bar scene, which she experienced as a crucial affirmation that she is not alone, but which met with her mother's disapproval; it took her mother years to become more accepting.  She described her current living situation as a split between 2 households, 1 with her partner of 25 years, who is older & "very controlling," & the other with the pt's mother.  She reports tension in her primary relationship that involves infidelity on her part back in 2009, an affair stemming from a high school reunion.  She said her partner now polices her, & she feels resentful but also doesn't want to leave her partner over it. The pt denied any suicidal or homicidal ideation, though endorsing remote history of a suicidal episode as a teenager.  Denied any cognitive deficit, psychosis, mood swings, or recreational drug use. Endorsed past struggle with some alcohol abuse, a period of abstinence with AA participation, and now periodic binges. Endorses struggle with explosive anger. Identified therapeutic goals include reduction of depression & anxiety, improved self-esteem, & coping skills, including anger management, assertiveness communication skills.        Symptoms:   ? Mood: depressed mood, diminished interest and worthlessness/guilt  ? Anxiety: excessive anxiety/worry, restlessness/keyed up and irritability  ? Substance abuse: substance tolerance and take more than intended  ? Cognitive functioning: denied  ? Health behaviors: some alcohol binges     Psychiatric history: psychotropic management by PCP and prior suicide attempt(s); no chemical abuse treatment reported but did attend AA for years and had a sponsor; now relapsed with a binge pattern of use.     Family history of psychiatric illness: father bipolar; sister with clinical anxiety     Social history: See history above; oldest of 3; younger brother drowned as an infant. Younger sister is her closest friend. Chaotic childhood; parents " "were very young; left patient & younger sister with grandmother when she was 4; father never returned; mother came back after one year. Grandmother, an aunt, and uncle were parental figures. Pt described growing social tension in school in middle school and high school, related to her budding identity as a masculine lesbian.  Reported running away from school and home several times in teens. Attempted suicide at 17. Discovered bhandari bars at 19  found the start of a sort of community.  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 Current committed relationship of the past 25 years; described partner as controling, domineering, but "she takes care of me." Endorsed tension of patient's past infidelity a decade ago. Previous longest relationship was for 5 years. No biological children of her own.  Never in a relationship with a man. Loves animals; finds them easier to relate to than people. Currently lives between two houses, one with her partner, the other with her mother.       Substance use:   Alcohol: binge pattern of abuse; denied any known health damage from alcohol   Drugs: none   Tobacco: none   Caffeine: average of 2 and a half cups of coffee daily    Patient confirms the above history. Reports that she's been anxious her "entire life"; overriding distress & impairment. Past dx'es include depression, bipolar disorder (some impulsive shopping). Meds through Dr. Bee Landon for many years, but stopped seeing Dr. Zhong's in summer 2019. Found treatment unaffordable.   Off medicine for 3-4 months. "had some left but thought it wasn't working, it makes me gain weight, & I'm sluggish". Thinks that these things improved off the medication.     "got an ultimatum" to get back onto medication by her partner who patient says has observed that patient is less irritable & agitated on medication (but she doesn't see this herself).     Endorses "yes" to most items on MDQ    Psych Hx: " ""anger issues" since a kid. "fought a lot", "usually defending other people", "I couldn't verbally say what I felt, so I thought", though rarely disciplinary problems in school. Protected her sister, friends.     Treated with medication by PCP in  or , dx'ed with depression in - by Dr. Zhong. Diagnosis later changed to bipolar disorder, but she doesn't believe this matches her. She's read about borderline personality & thinks that features of this diagnosis better match her understanding of herself. Was mostly off medication after Dr. Landon left private practice.     Went back to Dr. Landon in  when she became available again, has seen her since & has continued on her current regimen. Was seeing a therapist that works with her, Namita Lopez. They recommended a long-term treatment program in [place] at one point.     Reports mood problems have led her to be passed up for promotions, advancement.     Denies periods of mood elevation that have led to dysfunction or     Family Hx: father   Social Hx: as above. father left at age 6. Wasn't in her life after that, but sought him out at 41, but was disappointed by the relationship because he wanted money from her.   Has a younger sister & younger brother. Brother , drowned in the bathtub.   Lives between two households (between mother's and partner's). Mother is critical.   Master's degree. Works for the [employer].   No arrests.     Review Of Systems:     GENERAL:  No weight gain or loss  SKIN:  No rashes or lacerations  HEAD:  No headaches  EYES:  No exophthalmos, jaundice or blindness  EARS:  No dizziness, tinnitus or hearing loss  NOSE:  No changes in smell  MOUTH & THROAT:  No dyskinetic movements or obvious goiter  CHEST:  No shortness of breath, hyperventilation or cough  CARDIOVASCULAR:  No tachycardia or chest pain  ABDOMEN:  No nausea, vomiting, pain, constipation or diarrhea  URINARY:  No frequency, dysuria or sexual dysfunction  ENDOCRINE:  " No polydipsia, polyuria  MUSCULOSKELETAL:  No pain or stiffness of the joints  NEUROLOGIC:  No weakness, sensory changes, seizures, confusion, memory loss, tremor or other abnormal movements    Current Evaluation:     Nutritional Screening: Considering the patient's height and weight, medications, medical history and preferences, should a referral be made to the dietitian? no    Constitutional  Vitals:  Most recent vital signs, dated less than 90 days prior to this appointment, were reviewed.       General:  unremarkable, age appropriate     Musculoskeletal  Muscle Strength/Tone:  no tremor, no tic   Gait & Station:  non-ataxic     Psychiatric  Appearance: casually dressed & groomed;   Behavior: calm,   Cooperation: cooperative with assessment  Speech: normal rate, volume, tone  Thought Process: linear, goal-directed  Thought Content: No suicidal or homicidal ideation; no delusions  Affect: normal range  Mood: euthymic  Perceptions: No auditory or visual hallucinations  Level of Consciousness: alert throughout interview  Insight: fair  Cognition: Oriented to person, place, time, & situation  Memory: no apparent deficits to general clinical interview; not formally assessed  Attention/Concentration: no apparent deficits to general clinical interview; not formally assessed  Fund of Knowledge: average by vocabulary/education    Laboratory Data  No visits with results within 1 Month(s) from this visit.   Latest known visit with results is:   Lab Visit on 11/18/2019   Component Date Value Ref Range Status    Sodium 11/18/2019 143  136 - 145 mmol/L Final    Potassium 11/18/2019 4.3  3.5 - 5.1 mmol/L Final    Chloride 11/18/2019 105  95 - 110 mmol/L Final    CO2 11/18/2019 30* 23 - 29 mmol/L Final    Glucose 11/18/2019 82  70 - 110 mg/dL Final    BUN, Bld 11/18/2019 11  6 - 20 mg/dL Final    Creatinine 11/18/2019 0.8  0.5 - 1.4 mg/dL Final    Calcium 11/18/2019 9.2  8.7 - 10.5 mg/dL Final    Total Protein  11/18/2019 7.7  6.0 - 8.4 g/dL Final    Albumin 11/18/2019 3.8  3.5 - 5.2 g/dL Final    Total Bilirubin 11/18/2019 0.4  0.1 - 1.0 mg/dL Final    Alkaline Phosphatase 11/18/2019 108  55 - 135 U/L Final    AST 11/18/2019 17  10 - 40 U/L Final    ALT 11/18/2019 17  10 - 44 U/L Final    Anion Gap 11/18/2019 8  8 - 16 mmol/L Final    eGFR if African American 11/18/2019 >60.0  >60 mL/min/1.73 m^2 Final    eGFR if non African American 11/18/2019 >60.0  >60 mL/min/1.73 m^2 Final    WBC 11/18/2019 5.48  3.90 - 12.70 K/uL Final    RBC 11/18/2019 4.59  4.00 - 5.40 M/uL Final    Hemoglobin 11/18/2019 11.9* 12.0 - 16.0 g/dL Final    Hematocrit 11/18/2019 41.5  37.0 - 48.5 % Final    Mean Corpuscular Volume 11/18/2019 90  82 - 98 fL Final    Mean Corpuscular Hemoglobin 11/18/2019 25.9* 27.0 - 31.0 pg Final    Mean Corpuscular Hemoglobin Conc 11/18/2019 28.7* 32.0 - 36.0 g/dL Final    RDW 11/18/2019 14.8* 11.5 - 14.5 % Final    Platelets 11/18/2019 363* 150 - 350 K/uL Final    MPV 11/18/2019 9.7  9.2 - 12.9 fL Final    Immature Granulocytes 11/18/2019 0.2  0.0 - 0.5 % Final    Gran # (ANC) 11/18/2019 3.3  1.8 - 7.7 K/uL Final    Immature Grans (Abs) 11/18/2019 0.01  0.00 - 0.04 K/uL Final    Lymph # 11/18/2019 1.5  1.0 - 4.8 K/uL Final    Mono # 11/18/2019 0.5  0.3 - 1.0 K/uL Final    Eos # 11/18/2019 0.2  0.0 - 0.5 K/uL Final    Baso # 11/18/2019 0.06  0.00 - 0.20 K/uL Final    nRBC 11/18/2019 0  0 /100 WBC Final    Gran% 11/18/2019 59.7  38.0 - 73.0 % Final    Lymph% 11/18/2019 26.8  18.0 - 48.0 % Final    Mono% 11/18/2019 8.6  4.0 - 15.0 % Final    Eosinophil% 11/18/2019 3.6  0.0 - 8.0 % Final    Basophil% 11/18/2019 1.1  0.0 - 1.9 % Final    Differential Method 11/18/2019 Automated   Final    Cholesterol 11/18/2019 237* 120 - 199 mg/dL Final    Triglycerides 11/18/2019 118  30 - 150 mg/dL Final    HDL 11/18/2019 53  40 - 75 mg/dL Final    LDL Cholesterol 11/18/2019 160.4* 63.0 - 159.0 mg/dL  Final    Hdl/Cholesterol Ratio 11/18/2019 22.4  20.0 - 50.0 % Final    Total Cholesterol/HDL Ratio 11/18/2019 4.5  2.0 - 5.0 Final    Non-HDL Cholesterol 11/18/2019 184  mg/dL Final    TSH 11/18/2019 2.102  0.400 - 4.000 uIU/mL Final     Medications  Outpatient Encounter Medications as of 1/27/2020   Medication Sig Dispense Refill    fluoxetine (PROZAC) 20 MG capsule Take 20 mg by mouth every morning.  0    lamotrigine (LAMICTAL) 150 MG Tab   0    mupirocin (BACTROBAN) 2 % ointment by Nasal route 2 (two) times daily. 1 Tube 1    pantoprazole (PROTONIX) 40 MG tablet TAKE 1 TABLET BY MOUTH EVERY MORNING 30 MINUTES BEFORE EATING MEAL 30 tablet 11    QUEtiapine (SEROQUEL) 25 MG Tab TAKE 1 TABLET BY MOUTH EVERY EVENING 30 tablet 11     No facility-administered encounter medications on file as of 1/27/2020.      Assessment - Diagnosis - Goals:     Impression: Patient is a 59 y/o F with chronic bipolar disorder, with active major depressive episode despite adherence with medication; has hx of remote substance use disorders.     Dx: bipolar I disorder, mre depressed; anxiety    Treatment Goals:  Specify outcomes written in observable, behavioral terms:       Treatment Plan/Recommendations:   · Lamotrigine, buspirone for anxiety. Trazodone prn sleep.   · Clarify anxiety diagnoses.   · Discussed risks, benefits, and alternatives to treatment plan documented above with patient. I answered all patient questions related to this plan and patient expressed understanding and agreement.     Return to Clinic: 2 months    Counseling time: 10 minutes  Total time: 50 minutes    CLINT Soriano MD  Psychiatry  [facility]  [address] , [place] [address]  [phone]

## 2020-02-03 ENCOUNTER — HOSPITAL ENCOUNTER (OUTPATIENT)
Dept: RADIOLOGY | Facility: HOSPITAL | Age: 60
Discharge: HOME | End: 2020-02-03
Attending: FAMILY MEDICINE
Payer: COMMERCIAL

## 2020-02-03 VITALS — BODY MASS INDEX: 33.75 KG/M2 | WEIGHT: 210 LBS | HEIGHT: 66 IN

## 2020-02-03 DIAGNOSIS — Z12.31 ENCOUNTER FOR SCREENING MAMMOGRAM FOR MALIGNANT NEOPLASM OF BREAST: ICD-10-CM

## 2020-02-03 PROCEDURE — 77067 SCR MAMMO BI INCL CAD: CPT | Mod: TC

## 2020-02-03 PROCEDURE — 77063 MAMMO DIGITAL SCREENING BILAT WITH TOMOSYNTHESIS_CAD: ICD-10-PCS | Mod: 26,,, | Performed by: RADIOLOGY

## 2020-02-03 PROCEDURE — 77063 BREAST TOMOSYNTHESIS BI: CPT | Mod: 26,,, | Performed by: RADIOLOGY

## 2020-02-03 PROCEDURE — 77067 SCR MAMMO BI INCL CAD: CPT | Mod: 26,,, | Performed by: RADIOLOGY

## 2020-02-03 PROCEDURE — 77067 MAMMO DIGITAL SCREENING BILAT WITH TOMOSYNTHESIS_CAD: ICD-10-PCS | Mod: 26,,, | Performed by: RADIOLOGY

## 2020-02-10 ENCOUNTER — OFFICE VISIT (OUTPATIENT)
Dept: PSYCHIATRY | Facility: CLINIC | Age: 60
End: 2020-02-10
Payer: COMMERCIAL

## 2020-02-10 DIAGNOSIS — Z71.89 COUNSELING FOR MARITAL AND PARTNER PROBLEMS, UNSPECIFIED: ICD-10-CM

## 2020-02-10 DIAGNOSIS — F31.30 BIPOLAR DISORDER, MOST RECENT EPISODE DEPRESSED: Primary | ICD-10-CM

## 2020-02-10 DIAGNOSIS — F41.1 GAD (GENERALIZED ANXIETY DISORDER): ICD-10-CM

## 2020-02-10 DIAGNOSIS — Z63.0 COUNSELING FOR MARITAL AND PARTNER PROBLEMS, UNSPECIFIED: ICD-10-CM

## 2020-02-10 PROCEDURE — 90834 PSYTX W PT 45 MINUTES: CPT | Mod: S$GLB,,, | Performed by: SOCIAL WORKER

## 2020-02-10 PROCEDURE — 90834 PR PSYCHOTHERAPY W/PATIENT, 45 MIN: ICD-10-PCS | Mod: S$GLB,,, | Performed by: SOCIAL WORKER

## 2020-02-10 SDOH — SOCIAL DETERMINANTS OF HEALTH (SDOH): PROBLEMS IN RELATIONSHIP WITH SPOUSE OR PARTNER: Z63.0

## 2020-02-10 NOTE — PROGRESS NOTES
"Individual Psychotherapy (PhD/LCSW)    1/27/2020    [facility]:  [facility]         Therapeutic Intervention: Met with patient.  Outpatient - Insight oriented psychotherapy 45 min - CPT code 08346 and Outpatient - Supportive psychotherapy 45 min - CPT Code 11791    Chief complaint/reason for encounter: addictive disorder, depression, mood swings, anxiety and interpersonal     Interval history and content of current session:  60 year old female patient returned for follow up psychotherapy session to address depression, mood swings, anxiety, and relationship stressors.  Patient also had her medication management follow up appointment this morning.  Had a previous psychiatrist, Ita Landon MD, from fall of 2014 to spring of 2019.  PCP writing for her refills since that time.  Patient reported not feeling the medication is working; fears her partner's reaction if she were to reveal this, so she said she is "faking it."  Pretending to feel better than she does.  Stated she feels fearful and anxious "about everything."  Feeling fight or flight reflex often, and feels like crying often but does not; she believes her Prozac may stop her from being tearful.  Patient stating she avoids all news, fearful of being triggered.   Endorses especially not being able to handle any news of animals suffering.  Stating she is constantly worrying about things that have not happened any may never happen; said she wakes up fearful about each day.  Unable to sleep without her sleep aid medication but feels sedated with it.  Endorsed insomnia struggle for most of her life.  Patient denied any si/hi, psychosis, cognitive deficits, or rages, despite endorsing elevated irritability and low frustration tolerance. Supportive therapy provided.  Plan is to follow up in clinic in early February.        Treatment plan:  · Target symptoms: depression, anxiety , substance abuse, mood swings, grief, relationship tension  · Why chosen therapy is " appropriate versus another modality: relevant to diagnosis, patient responds to this modality  · Outcome monitoring methods: self-report, observation  · Therapeutic intervention type: insight oriented psychotherapy, supportive psychotherapy    Risk parameters:  Patient reports no suicidal ideation  Patient reports no homicidal ideation  Patient reports no self-injurious behavior  Patient reports no violent behavior    Verbal deficits: None    Patient's response to intervention:  The patient's response to intervention is accepting.    Progress toward goals and other mental status changes:  The patient's progress toward goals is mixed.    Diagnosis:     ICD-10-CM ICD-9-CM   1. Bipolar I disorder, most recent episode (or current) depressed F31.30 296.50   2. ERNESTINE (generalized anxiety disorder) F41.1 300.02   3. Partner relationship problems Z63.0 V61.10   History of Alcohol use disorder     Plan:  individual psychotherapy and medication management by physician    Return to clinic: as scheduled, 2/10/20.    Length of Service (minutes): 45

## 2020-02-13 ENCOUNTER — PATIENT MESSAGE (OUTPATIENT)
Dept: PSYCHIATRY | Facility: CLINIC | Age: 60
End: 2020-02-13

## 2020-02-24 ENCOUNTER — OFFICE VISIT (OUTPATIENT)
Dept: PSYCHIATRY | Facility: CLINIC | Age: 60
End: 2020-02-24
Payer: COMMERCIAL

## 2020-02-24 DIAGNOSIS — F31.9 BIPOLAR 1 DISORDER, DEPRESSED: Primary | ICD-10-CM

## 2020-02-24 DIAGNOSIS — F41.1 GAD (GENERALIZED ANXIETY DISORDER): ICD-10-CM

## 2020-02-24 PROCEDURE — 90834 PR PSYCHOTHERAPY W/PATIENT, 45 MIN: ICD-10-PCS | Mod: S$GLB,,, | Performed by: SOCIAL WORKER

## 2020-02-24 PROCEDURE — 90834 PSYTX W PT 45 MINUTES: CPT | Mod: S$GLB,,, | Performed by: SOCIAL WORKER

## 2020-02-24 NOTE — PROGRESS NOTES
Individual Psychotherapy (PhD/LCSW)    2/10/2020    [facility]         Therapeutic Intervention: Met with patient.  Outpatient - Insight oriented psychotherapy 45 min - CPT code 86531 and Outpatient - Supportive psychotherapy 45 min - CPT Code 23853    Chief complaint/reason for encounter: addictive disorder, depression, mood swings, anxiety and interpersonal     Interval history and content of current session:  60 year old female patient returned for follow up psychotherapy session to address depression, mood swings, anxiety, and relationship stressors.  Patient reported she had stopped her Prozac and Seroquel medications and was prescribed Trazodone and Buspar, while continuing her Lamictal.  Said she has been worried about sudden urinary incontinence, and she messaged Dr. Still about this concern.  Anxiety remains but intermittently.  She talked about frequent arguments with her partner, saying she moves on quickly from them emotionally, not dwelling on it, but the arguments keeping occurring.  Thinks some of it is due to her, some due to her partner.  Patient denied any si/hi, psychosis, cognitive deficits, or rages, despite endorsing elevated irritability and low frustration tolerance. Supportive therapy provided.  Plan is to follow up in clinic 2/24/20.       Treatment plan:  · Target symptoms: depression, anxiety , substance abuse, mood swings, grief, relationship tension  · Why chosen therapy is appropriate versus another modality: relevant to diagnosis, patient responds to this modality  · Outcome monitoring methods: self-report, observation  · Therapeutic intervention type: insight oriented psychotherapy, supportive psychotherapy    Risk parameters:  Patient reports no suicidal ideation  Patient reports no homicidal ideation  Patient reports no self-injurious behavior  Patient reports no violent behavior    Verbal deficits: None    Patient's response to intervention:  The patient's response  to intervention is accepting.    Progress toward goals and other mental status changes:  The patient's progress toward goals is mixed.    Diagnosis:     ICD-10-CM ICD-9-CM   1. Bipolar disorder, most recent episode depressed F31.30 296.50   2. ERNESTINE (generalized anxiety disorder) F41.1 300.02   3. Counseling for marital and partner problems, unspecified Z63.0 V61.10   History of Alcohol use disorder     Plan:  individual psychotherapy and medication management by physician    Return to clinic: as scheduled, 2/10/20.    Length of Service (minutes): 45

## 2020-03-09 ENCOUNTER — OFFICE VISIT (OUTPATIENT)
Dept: PSYCHIATRY | Facility: CLINIC | Age: 60
End: 2020-03-09
Payer: COMMERCIAL

## 2020-03-09 DIAGNOSIS — F41.1 GAD (GENERALIZED ANXIETY DISORDER): ICD-10-CM

## 2020-03-09 DIAGNOSIS — Z63.0 PARTNER RELATIONSHIP PROBLEM: ICD-10-CM

## 2020-03-09 DIAGNOSIS — F31.9 BIPOLAR I DISORDER, CURRENT EPISODE DEPRESSED: Primary | ICD-10-CM

## 2020-03-09 PROCEDURE — 90834 PSYTX W PT 45 MINUTES: CPT | Mod: S$GLB,,, | Performed by: SOCIAL WORKER

## 2020-03-09 PROCEDURE — 90834 PR PSYCHOTHERAPY W/PATIENT, 45 MIN: ICD-10-PCS | Mod: S$GLB,,, | Performed by: SOCIAL WORKER

## 2020-03-09 SDOH — SOCIAL DETERMINANTS OF HEALTH (SDOH): PROBLEMS IN RELATIONSHIP WITH SPOUSE OR PARTNER: Z63.0

## 2020-03-09 NOTE — PROGRESS NOTES
"Individual Psychotherapy (PhD/LCSW)    2/24/2020    Site:  [facility]         Therapeutic Intervention: Met with patient.  Outpatient - Insight oriented psychotherapy 45 min - CPT code 04546 and Outpatient - Supportive psychotherapy 45 min - CPT Code 95204    Chief complaint/reason for encounter: addictive disorder, depression, mood swings, anxiety and interpersonal     Interval history and content of current session:  60 year old female patient returned for follow up psychotherapy session to address depression, mood swings, anxiety, and relationship stressors.  Patient reporting feeling "okay at the moment," but mood has still been up and down recently.  Said she has been thinking lately about bipolar disorder vs what constitutes borderline personality disorder.  Seeing signs of dependent personality and manipulativeness in herself.  Said she has been reading on it and seeking to apply it.  Patient said she recognizes her temper is short-fused.  Endorsed becoming concerned about a spiritual rift between herself and her partner, who is "a by the book Evangelical."  Patient has increasingly been drawn to free-thought literature and finds it compelling.  Partner does not like that at all and keeps telling her to stop reading those things.  Patient denied any si/hi, psychosis, cognitive deficits, or rages, despite endorsing elevated irritability and low frustration tolerance. Supportive therapy provided.  Plan is to follow up in clinic 3/9/20.       Treatment plan:  · Target symptoms: depression, anxiety , substance abuse, mood swings, grief, relationship tension  · Why chosen therapy is appropriate versus another modality: relevant to diagnosis, patient responds to this modality  · Outcome monitoring methods: self-report, observation  · Therapeutic intervention type: insight oriented psychotherapy, supportive psychotherapy    Risk parameters:  Patient reports no suicidal ideation  Patient reports no homicidal " ideation  Patient reports no self-injurious behavior  Patient reports no violent behavior    Verbal deficits: None    Patient's response to intervention:  The patient's response to intervention is accepting.    Progress toward goals and other mental status changes:  The patient's progress toward goals is fair.    Diagnosis:     ICD-10-CM ICD-9-CM   1. Bipolar 1 disorder, depressed F31.9 296.50   2. ERNESTINE (generalized anxiety disorder) F41.1 300.02   History of Alcohol use disorder     Plan:  individual psychotherapy and medication management by physician    Return to clinic: as scheduled, 2/10/20.    Length of Service (minutes): 45

## 2020-04-06 ENCOUNTER — OFFICE VISIT (OUTPATIENT)
Dept: PSYCHIATRY | Facility: CLINIC | Age: 60
End: 2020-04-06
Payer: COMMERCIAL

## 2020-04-06 DIAGNOSIS — F31.30 BIPOLAR DISORDER, MOST RECENT EPISODE DEPRESSED: Primary | ICD-10-CM

## 2020-04-06 DIAGNOSIS — F41.1 GAD (GENERALIZED ANXIETY DISORDER): ICD-10-CM

## 2020-04-06 DIAGNOSIS — Z63.0 PARTNER RELATIONSHIP PROBLEM: ICD-10-CM

## 2020-04-06 PROCEDURE — 90834 PR PSYCHOTHERAPY W/PATIENT, 45 MIN: ICD-10-PCS | Mod: 95,,, | Performed by: SOCIAL WORKER

## 2020-04-06 PROCEDURE — 90834 PSYTX W PT 45 MINUTES: CPT | Mod: 95,,, | Performed by: SOCIAL WORKER

## 2020-04-06 SDOH — SOCIAL DETERMINANTS OF HEALTH (SDOH): PROBLEMS IN RELATIONSHIP WITH SPOUSE OR PARTNER: Z63.0

## 2020-04-06 NOTE — PROGRESS NOTES
"Individual Psychotherapy (PhD/LCSW)    4/6/2020    Site:  [facility]         Therapeutic Intervention: Met with patient.  Outpatient - Insight oriented psychotherapy 45 min - CPT code 81096 and Outpatient - Supportive psychotherapy 45 min - CPT Code 65688  Virtual visit with synchronous audio and video--Patient presented at home.    Chief complaint/reason for encounter: addictive disorder, depression, mood swings, anxiety and interpersonal     Interval history and content of current session:  60 year old female patient returned for follow up psychotherapy session to address depression, mood swings, anxiety, and relationship stressors.  Previously seen 3/9/20.  Patient presented via her living room in the house she shares with her mother.  Asked mother to give her some time alone.  Talked about new work from home arrangements and adjustment to that routine due to Corona virus pandemic.  Discussed emotional needs management and patterns she understands as resembling borderline personality disorder in herself; she has been reading about it.  Talked about communication with her partner, whom she said is very cut and dry absolutist about many things.  Patient is not; though she also identifies as sometimes lacking patience with people.  Discussed "I" message communication for enhanced conveyance of respectful assertiveness. Patient denied any si/hi, psychosis, cognitive deficits, or rages, despite endorsing elevated irritability and low frustration tolerance. Supportive therapy provided.  Plan is to follow up by virtual visit scheduled.       Treatment plan:  · Target symptoms: depression, anxiety , substance abuse, mood swings, grief, relationship tension  · Why chosen therapy is appropriate versus another modality: relevant to diagnosis, patient responds to this modality  · Outcome monitoring methods: self-report, observation  · Therapeutic intervention type: insight oriented psychotherapy, supportive " psychotherapy    Risk parameters:  Patient reports no suicidal ideation  Patient reports no homicidal ideation  Patient reports no self-injurious behavior  Patient reports no violent behavior    Verbal deficits: None    Patient's response to intervention:  The patient's response to intervention is accepting.    Progress toward goals and other mental status changes:  The patient's progress toward goals is mixed.    Diagnosis:     ICD-10-CM ICD-9-CM   1. Bipolar disorder, most recent episode depressed F31.30 296.50   2. ERNESTINE (generalized anxiety disorder) F41.1 300.02   3. Partner relationship problem Z63.0 V61.10   History of Alcohol use disorder     Plan:  individual psychotherapy and medication management by physician    Return to clinic: as scheduled , 4/23/20    Length of Service (minutes): 45

## 2020-04-06 NOTE — PROGRESS NOTES
Individual Psychotherapy (PhD/LCSW)    3/9/2020    [facility]:  [facility]         Therapeutic Intervention: Met with patient.  Outpatient - Insight oriented psychotherapy 45 min - CPT code 72218 and Outpatient - Supportive psychotherapy 45 min - CPT Code 26248    Chief complaint/reason for encounter: addictive disorder, depression, mood swings, anxiety and interpersonal     Interval history and content of current session:  Late entry for 3/9/20.  60 year old female patient returned for follow up psychotherapy session to address depression, mood swings, anxiety, and relationship stressors.  Previously seen 2/24/20.  Patient endorsed concerns that she may be passive-aggressive.  Reported missing two days at work last week for emotional depression, stayed in bed for 2 days, Wed. And Thurs march 4th and 5th.  Worked Friday and the weekend was good.  She talked about partner's dislike of AA people, saying her opposition appears to be somewhat mellowing now.  Patient acknowledged having had an affair in 2000 with someone who is .  Now taking a [employer] class is is primarily , which she worries with set off her partner if it becomes known.  Patient denied any si/hi, psychosis, cognitive deficits, or rages, despite endorsing elevated irritability and low frustration tolerance. Supportive therapy provided.  Plan is to follow up by virtual visit scheduled for 4/6/20.       Treatment plan:  · Target symptoms: depression, anxiety , substance abuse, mood swings, grief, relationship tension  · Why chosen therapy is appropriate versus another modality: relevant to diagnosis, patient responds to this modality  · Outcome monitoring methods: self-report, observation  · Therapeutic intervention type: insight oriented psychotherapy, supportive psychotherapy    Risk parameters:  Patient reports no suicidal ideation  Patient reports no homicidal ideation  Patient reports no self-injurious behavior  Patient  reports no violent behavior    Verbal deficits: None    Patient's response to intervention:  The patient's response to intervention is accepting.    Progress toward goals and other mental status changes:  The patient's progress toward goals is mixed.    Diagnosis:     ICD-10-CM ICD-9-CM   1. Bipolar I disorder, current episode depressed F31.9 296.50   2. ERNESTINE (generalized anxiety disorder) F41.1 300.02   3. Partner relationship problem Z63.0 V61.10   History of Alcohol use disorder     Plan:  individual psychotherapy and medication management by physician    Return to clinic: as scheduled, 2/10/20.    Length of Service (minutes): 45

## 2020-04-21 ENCOUNTER — OFFICE VISIT (OUTPATIENT)
Dept: PSYCHIATRY | Facility: CLINIC | Age: 60
End: 2020-04-21
Payer: COMMERCIAL

## 2020-04-21 DIAGNOSIS — F31.30 BIPOLAR AFFECTIVE DISORDER, CURRENT EPISODE DEPRESSED, CURRENT EPISODE SEVERITY UNSPECIFIED: Primary | ICD-10-CM

## 2020-04-21 PROCEDURE — 99213 OFFICE O/P EST LOW 20 MIN: CPT | Mod: 95,,, | Performed by: PSYCHIATRY & NEUROLOGY

## 2020-04-21 PROCEDURE — 99213 PR OFFICE/OUTPT VISIT, EST, LEVL III, 20-29 MIN: ICD-10-PCS | Mod: 95,,, | Performed by: PSYCHIATRY & NEUROLOGY

## 2020-04-21 RX ORDER — TRAZODONE HYDROCHLORIDE 100 MG/1
TABLET ORAL
Qty: 60 TABLET | Refills: 2 | Status: SHIPPED | OUTPATIENT
Start: 2020-04-21 | End: 2020-07-20 | Stop reason: SDUPTHER

## 2020-04-21 RX ORDER — BUSPIRONE HYDROCHLORIDE 30 MG/1
30 TABLET ORAL 2 TIMES DAILY
Qty: 60 TABLET | Refills: 2 | Status: SHIPPED | OUTPATIENT
Start: 2020-04-21 | End: 2020-07-20 | Stop reason: SDUPTHER

## 2020-04-21 RX ORDER — LAMOTRIGINE 150 MG/1
150 TABLET ORAL DAILY
Qty: 30 TABLET | Refills: 2 | Status: SHIPPED | OUTPATIENT
Start: 2020-04-21 | End: 2020-07-20 | Stop reason: SDUPTHER

## 2020-04-21 NOTE — PROGRESS NOTES
"Outpatient Psychiatry Follow-up Visit (MD/NP)    2020    June Stevens, a 60 y.o. female, presenting for follow-up visit. Met with patient.    Reason for Encounter: Chief complaint/reason for encounter: addictive disorder, depression, mood swings, anxiety, behavior and interpersonal    Interval Hx: Patient seen and interviewed for follow-up, about 3 months since initial visit. This was a video visit and patient was in [place]. Reports that she's been in good health, with no new general health symptoms, no new medicines. Social distancing in context of COVID outbreak. Working from home - going ok. Is keeping her mom away from crowds. Patient has two houses. Sees her mom daily. Moods have been stressed when consuming social media. No side effects     Background: Pt is a 58 y/o F who presents for establishment of psychiatric care, previously evaluated by Mr. Junito Costa. From his note: reporting chief complaint of "my partner thought since I am getting medication, I ought probably to have some talk therapy as well." Pt described a history of mood swings & recurrent depression going back to her teenage years, a recurrent struggle with anger & angry outbursts, & poor coping skills, relying too much on binge drinking (less than weekly, but always to a point of heavy intoxication) to blot out anger & anxiety. She described a fragile self-esteem, pattern of defensiveness, heightened irritability, & frequent depressive cycles. She believes she has abandonment issues from her parents having left for [place] abruptly when she was just 4 years old, leaving her & her younger sister with a grandmother. Mom returned alone after 1 year but never fully embodied a parental figure to the pt. Her grandmother, who represented stability,  when the pt was 27. She recalls mood issues going back to [employer] age, age 12 or 13. She said that was when social issues at school started to become more difficult as she & " "those around her started noticing she wasn't conforming to stereotypical feminine norms & came to be suspected of being a lesbian. She described an emotional struggle between her Jew beliefs & growing awareness of her same-sex orientation. She said she tried to date boys. She reported a lot of peer rejection, being known as "a alcides lesbian," & she attempted suicide at age 17, stepping out in front of an oncoming car. By age 19 she had been introduced to the bhandari bar scene, which she experienced as a crucial affirmation that she isn't alone, but which met with her mother's disapproval; it took her mother years to become more accepting.  She described her current living situation as a split between 2 households, 1 with her partner of 25 years, who is older & "very controlling," & the other with the pt's mother. She reports tension in her primary relationship that involves infidelity on her part back in 2009, an affair stemming from a high school reunion. She said her partner now polices her, & she feels resentful but also doesn't want to leave her partner over it. The pt denied any suicidal or homicidal ideation, though endorsing remote history of a suicidal episode as a teenager.  Denied any cognitive deficit, psychosis, mood swings, or recreational drug use. Endorsed past struggle with some alcohol abuse, a period of abstinence with AA participation, & now periodic binges. Endorses struggle with explosive anger. Identified therapeutic goals include reduction of depression & anxiety, improved self-esteem, & coping skills, including anger management, assertiveness communication skills.        Symptoms:   ? Mood: depressed mood, diminished interest and worthlessness/guilt  ? Anxiety: excessive anxiety/worry, restlessness/keyed up and irritability  ? Substance abuse: substance tolerance and take more than intended  ? Cognitive functioning: denied  ? Health behaviors: some alcohol binges     Psychiatric history: " "psychotropic management by PCP & prior suicide attempt(s); no chemical abuse treatment reported but did attend AA for years and had a sponsor; now relapsed with a binge pattern of use. Family history of psychiatric illness: father bipolar; sister with clinical anxiety. Social history: See history above; oldest of 3; younger brother drowned as an infant. Younger sister is her closest friend. Chaotic childhood; parents were very young; left patient & younger sister with grandmother when she was 4; father never returned; mother came back after one year. Grandmother, an aunt, and uncle were parental figures. Pt described growing social tension in school in middle school and high school, related to her budding identity as a masculine lesbian.  Reported running away from school and home several times in teens. Attempted suicide at 17. Discovered bhandari bars at 19  found the start of a sort of community. 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 Current committed relationship of the past 25 years; described partner as controling, domineering, but "she takes care of me." Endorsed tension of pt's past infidelity a decade ago. Previous longest relationship was for 5 years. No biological children of her own.  Never in a relationship with a man. Loves animals; finds them easier to relate to than people. Currently lives between two houses, one with her partner, the other with her mother.       Substance use:   Alcohol: binge pattern of abuse; denied any known health damage from alcohol   Drugs: none   Tobacco: none   Caffeine: average of 2 and a half cups of coffee daily    Patient confirms the above history. Reports that she's been anxious her "entire life"; overriding distress & impairment. Past dx'es include depression, bipolar disorder (some impulsive shopping). Meds through Dr. Bee Landon for many years, but stopped seeing Dr. Zhong's in summer 2019. Found treatment unaffordable. Off " "medicine for 3-4 months. "had some left but thought it wasn't working, it makes me gain weight, & I'm sluggish". Thinks that these things improved off the medication.     "got an ultimatum" to get back onto medication by her partner who patient says has observed that patient is less irritable & agitated on medication (but she doesn't see this herself).     Endorses "yes" to most items on MDQ    Psych Hx: "anger issues" since a kid. "fought a lot", "usually defending other people", "I couldn't verbally say what I felt, so I thought", though rarely disciplinary problems in school. Protected her sister, friends.     Treated with medication by PCP in  or , dx'ed with depression in - by Dr. Zhong. Diagnosis later changed to bipolar disorder, but she doesn't believe this matches her. She's read about borderline personality & thinks that features of this diagnosis better match her understanding of herself. Was mostly off medication after Dr. Landon left private practice.     Went back to Dr. Landon in  when she became available again, has seen her since & has continued on her current regimen. Was seeing a therapist that works with her, Namita Lopez. They recommended a long-term treatment program in [place] at one point.     Reports mood problems have led her to be passed up for promotions, advancement.     Denies periods of mood elevation that have led to dysfunction or     Family Hx: father   Social Hx: as above. father left at age 6. Wasn't in her life after that, but sought him out at 41, but was disappointed by the relationship because he wanted money from her.   Has a younger sister & younger brother. Brother , drowned in the bathtub.   Lives between two households (between mother's and partner's). Mother is critical.   Master's degree. Works for the state.   No arrests.     Review Of Systems:     GENERAL:  No weight gain or loss  SKIN:  No rashes or lacerations  HEAD:  No headaches  EYES:  No " exophthalmos, jaundice or blindness  EARS:  No dizziness, tinnitus or hearing loss  NOSE:  No changes in smell  MOUTH & THROAT:  No dyskinetic movements or obvious goiter  CHEST:  No shortness of breath, hyperventilation or cough  CARDIOVASCULAR:  No tachycardia or chest pain  ABDOMEN:  No nausea, vomiting, pain, constipation or diarrhea  URINARY:  No frequency, dysuria or sexual dysfunction  ENDOCRINE:  No polydipsia, polyuria  MUSCULOSKELETAL:  No pain or stiffness of the joints  NEUROLOGIC:  No weakness, sensory changes, seizures, confusion, memory loss, tremor or other abnormal movements    Current Evaluation:     Nutritional Screening: Considering the patient's height and weight, medications, medical history and preferences, should a referral be made to the dietitian? no    Constitutional  Vitals:  Most recent vital signs, dated less than 90 days prior to this appointment, were reviewed.       General:  unremarkable, age appropriate     Musculoskeletal  Muscle Strength/Tone:  no tremor, no tic   Gait & Station:  non-ataxic     Psychiatric  Appearance: casually dressed & groomed;   Behavior: calm,   Cooperation: cooperative with assessment  Speech: normal rate, volume, tone  Thought Process: linear, goal-directed  Thought Content: No suicidal or homicidal ideation; no delusions  Affect: normal range  Mood: euthymic  Perceptions: No auditory or visual hallucinations  Level of Consciousness: alert throughout interview  Insight: fair  Cognition: Oriented to person, place, time, & situation  Memory: no apparent deficits to general clinical interview; not formally assessed  Attention/Concentration: no apparent deficits to general clinical interview; not formally assessed  Fund of Knowledge: average by vocabulary/education    Laboratory Data  No visits with results within 1 Month(s) from this visit.   Latest known visit with results is:   Lab Visit on 11/18/2019   Component Date Value Ref Range Status    Sodium  11/18/2019 143  136 - 145 mmol/L Final    Potassium 11/18/2019 4.3  3.5 - 5.1 mmol/L Final    Chloride 11/18/2019 105  95 - 110 mmol/L Final    CO2 11/18/2019 30* 23 - 29 mmol/L Final    Glucose 11/18/2019 82  70 - 110 mg/dL Final    BUN, Bld 11/18/2019 11  6 - 20 mg/dL Final    Creatinine 11/18/2019 0.8  0.5 - 1.4 mg/dL Final    Calcium 11/18/2019 9.2  8.7 - 10.5 mg/dL Final    Total Protein 11/18/2019 7.7  6.0 - 8.4 g/dL Final    Albumin 11/18/2019 3.8  3.5 - 5.2 g/dL Final    Total Bilirubin 11/18/2019 0.4  0.1 - 1.0 mg/dL Final    Alkaline Phosphatase 11/18/2019 108  55 - 135 U/L Final    AST 11/18/2019 17  10 - 40 U/L Final    ALT 11/18/2019 17  10 - 44 U/L Final    Anion Gap 11/18/2019 8  8 - 16 mmol/L Final    eGFR if African American 11/18/2019 >60.0  >60 mL/min/1.73 m^2 Final    eGFR if non African American 11/18/2019 >60.0  >60 mL/min/1.73 m^2 Final    WBC 11/18/2019 5.48  3.90 - 12.70 K/uL Final    RBC 11/18/2019 4.59  4.00 - 5.40 M/uL Final    Hemoglobin 11/18/2019 11.9* 12.0 - 16.0 g/dL Final    Hematocrit 11/18/2019 41.5  37.0 - 48.5 % Final    Mean Corpuscular Volume 11/18/2019 90  82 - 98 fL Final    Mean Corpuscular Hemoglobin 11/18/2019 25.9* 27.0 - 31.0 pg Final    Mean Corpuscular Hemoglobin Conc 11/18/2019 28.7* 32.0 - 36.0 g/dL Final    RDW 11/18/2019 14.8* 11.5 - 14.5 % Final    Platelets 11/18/2019 363* 150 - 350 K/uL Final    MPV 11/18/2019 9.7  9.2 - 12.9 fL Final    Immature Granulocytes 11/18/2019 0.2  0.0 - 0.5 % Final    Gran # (ANC) 11/18/2019 3.3  1.8 - 7.7 K/uL Final    Immature Grans (Abs) 11/18/2019 0.01  0.00 - 0.04 K/uL Final    Lymph # 11/18/2019 1.5  1.0 - 4.8 K/uL Final    Mono # 11/18/2019 0.5  0.3 - 1.0 K/uL Final    Eos # 11/18/2019 0.2  0.0 - 0.5 K/uL Final    Baso # 11/18/2019 0.06  0.00 - 0.20 K/uL Final    nRBC 11/18/2019 0  0 /100 WBC Final    Gran% 11/18/2019 59.7  38.0 - 73.0 % Final    Lymph% 11/18/2019 26.8  18.0 - 48.0 % Final     Mono% 11/18/2019 8.6  4.0 - 15.0 % Final    Eosinophil% 11/18/2019 3.6  0.0 - 8.0 % Final    Basophil% 11/18/2019 1.1  0.0 - 1.9 % Final    Differential Method 11/18/2019 Automated   Final    Cholesterol 11/18/2019 237* 120 - 199 mg/dL Final    Triglycerides 11/18/2019 118  30 - 150 mg/dL Final    HDL 11/18/2019 53  40 - 75 mg/dL Final    LDL Cholesterol 11/18/2019 160.4* 63.0 - 159.0 mg/dL Final    Hdl/Cholesterol Ratio 11/18/2019 22.4  20.0 - 50.0 % Final    Total Cholesterol/HDL Ratio 11/18/2019 4.5  2.0 - 5.0 Final    Non-HDL Cholesterol 11/18/2019 184  mg/dL Final    TSH 11/18/2019 2.102  0.400 - 4.000 uIU/mL Final     Medications  Outpatient Encounter Medications as of 4/21/2020   Medication Sig Dispense Refill    busPIRone (BUSPAR) 30 MG Tab Take 1 tablet (30 mg total) by mouth 2 (two) times daily. 60 tablet 2    lamoTRIgine (LAMICTAL) 150 MG Tab Take 1 tablet (150 mg total) by mouth once daily. 30 tablet 2    mupirocin (BACTROBAN) 2 % ointment by Nasal route 2 (two) times daily. 1 Tube 1    pantoprazole (PROTONIX) 40 MG tablet TAKE 1 TABLET BY MOUTH EVERY MORNING 30 MINUTES BEFORE EATING MEAL 30 tablet 11    traZODone (DESYREL) 100 MG tablet Take 1/2 to 1 tablet at bedtime as needed for sleep. 30 tablet 2     No facility-administered encounter medications on file as of 4/21/2020.      Assessment - Diagnosis - Goals:     Impression: Patient is a 61 y/o F with chronic bipolar disorder, with active major depressive episode despite adherence with medication; has hx of remote substance use disorders.     Dx: bipolar I disorder, mre depressed; anxiety    Treatment Goals:  Specify outcomes written in observable, behavioral terms: prevent recurrences.    Treatment Plan/Recommendations:   · Lamotrigine, buspirone for anxiety. Trazodone prn sleep.   · Clarify anxiety diagnoses.   · Discussed risks, benefits, and alternatives to treatment plan documented above with patient. I answered all patient  questions related to this plan and patient expressed understanding and agreement.     Return to Clinic: 2 months    CLINT Soriano MD  Psychiatry  [facility]  [address] , [place] [address] [phone]

## 2020-04-23 ENCOUNTER — PATIENT MESSAGE (OUTPATIENT)
Dept: PSYCHIATRY | Facility: CLINIC | Age: 60
End: 2020-04-23

## 2020-04-23 ENCOUNTER — OFFICE VISIT (OUTPATIENT)
Dept: PSYCHIATRY | Facility: CLINIC | Age: 60
End: 2020-04-23
Payer: COMMERCIAL

## 2020-04-23 DIAGNOSIS — F31.30 BIPOLAR DISORDER, MOST RECENT EPISODE DEPRESSED: Primary | ICD-10-CM

## 2020-04-23 DIAGNOSIS — F41.1 GAD (GENERALIZED ANXIETY DISORDER): ICD-10-CM

## 2020-04-23 PROCEDURE — 90834 PR PSYCHOTHERAPY W/PATIENT, 45 MIN: ICD-10-PCS | Mod: 95,,, | Performed by: SOCIAL WORKER

## 2020-04-23 PROCEDURE — 90834 PSYTX W PT 45 MINUTES: CPT | Mod: 95,,, | Performed by: SOCIAL WORKER

## 2020-04-23 NOTE — PROGRESS NOTES
Individual Psychotherapy (PhD/LCSW)    4/23/2020    Site:  [facility]         Therapeutic Intervention: Met with patient.  Outpatient - Insight oriented psychotherapy 45 min - CPT code 02843 and Outpatient - Supportive psychotherapy 45 min - CPT Code 59175  Virtual visit with synchronous audio and video--Patient presented at home.    Chief complaint/reason for encounter: addictive disorder, depression, mood swings, anxiety and interpersonal     Interval history and content of current session:  60 year old female patient returned for follow up psychotherapy session to address depression, mood swings, anxiety, and relationship stressors.  Previously seen 4/6/20.  Patient presented via her living room in the house she shares with her mother.  Patient indicated she continues to work from home there and is in that house Monday through Friday and at her other home with her life partner on weekends.  Her partner has been handling most errands of groceries, though the patient also might make a trip, no more than weekly.  She fears bringing the virus home to her mother inadvertently. Patient indicated work has been slow, which has not bothered her at the moment but might be a concern long-term.  Talked about her mother continuing to be quite negative with her, both criticizing and complaining, even as she relies on the patient significantly for many things.  Talked about why the patient puts up with it, while her own sister won't stand for such verbal treatment from their mother and yet maintains decent contact with their mother.  Discussed Patient's guilt feelings that facilitate her being more easily manipulated by her mother's tactics.  Patient denied any si/hi, psychosis, cognitive deficits, or rages, despite endorsing elevated irritability and low frustration tolerance. Supportive therapy provided.  Plan is to follow up by virtual visit scheduled, 5/4/20.       Treatment plan:  · Target symptoms: depression, anxiety ,  substance abuse, mood swings, grief, relationship tension  · Why chosen therapy is appropriate versus another modality: relevant to diagnosis, patient responds to this modality  · Outcome monitoring methods: self-report, observation  · Therapeutic intervention type: insight oriented psychotherapy, supportive psychotherapy    Risk parameters:  Patient reports no suicidal ideation  Patient reports no homicidal ideation  Patient reports no self-injurious behavior  Patient reports no violent behavior    Verbal deficits: None    Patient's response to intervention:  The patient's response to intervention is accepting.    Progress toward goals and other mental status changes:  The patient's progress toward goals is mixed.    Diagnosis:     ICD-10-CM ICD-9-CM   1. Bipolar disorder, most recent episode depressed F31.30 296.50   2. ERNESTINE (generalized anxiety disorder) F41.1 300.02   History of Alcohol use disorder     Plan:  individual psychotherapy and medication management by physician    Return to clinic: as scheduled     Length of Service (minutes): 45

## 2020-05-07 ENCOUNTER — OFFICE VISIT (OUTPATIENT)
Dept: PSYCHIATRY | Facility: CLINIC | Age: 60
End: 2020-05-07
Payer: COMMERCIAL

## 2020-05-07 DIAGNOSIS — F31.30 BIPOLAR DISORDER, MOST RECENT EPISODE DEPRESSED: Primary | ICD-10-CM

## 2020-05-07 DIAGNOSIS — F41.1 GAD (GENERALIZED ANXIETY DISORDER): ICD-10-CM

## 2020-05-07 PROCEDURE — 90834 PSYTX W PT 45 MINUTES: CPT | Mod: 95,,, | Performed by: SOCIAL WORKER

## 2020-05-07 PROCEDURE — 90834 PR PSYCHOTHERAPY W/PATIENT, 45 MIN: ICD-10-PCS | Mod: 95,,, | Performed by: SOCIAL WORKER

## 2020-05-07 NOTE — PROGRESS NOTES
"Individual Psychotherapy (PhD/LCSW)    5/7/2020    Site:  [facility]         Therapeutic Intervention: Met with patient.  Outpatient - Insight oriented psychotherapy 45 min - CPT code 87005 and Outpatient - Supportive psychotherapy 45 min - CPT Code 97342  Virtual visit with synchronous audio and video--Patient presented at home.    Chief complaint/reason for encounter: addictive disorder, depression, mood swings, anxiety and interpersonal     Interval history and content of current session:  60 year old female patient returned for follow up psychotherapy session to address depression, mood swings, anxiety, and relationship stressors.  Previously seen 4/2/20.  Patient presented via virtual visit from her back patio she shares with her wife.  Dog "Bitsy" in her lap.   Talked for a moment about her elderly neighbor who passed 3 years ago and the friendship with that neighbor that started due to the dog.  Patient talked about some regret she is feeling about how she has communicated with her wife, who has been invited on a trip to [place] by her adult son.  Patient said she realized she was resentful, because she has always wanted to go to [place].  Talked about Covid-19 concerns and in the context of travel at this time.  Patient denied any si/hi, psychosis, cognitive deficits, or rages, despite endorsing elevated irritability and low frustration tolerance. Supportive therapy provided.  Plan is to follow up by virtual visit scheduled, 5/25/20.       Treatment plan:  · Target symptoms: depression, anxiety , substance abuse, mood swings, grief, relationship tension  · Why chosen therapy is appropriate versus another modality: relevant to diagnosis, patient responds to this modality  · Outcome monitoring methods: self-report, observation  · Therapeutic intervention type: insight oriented psychotherapy, supportive psychotherapy    Risk parameters:  Patient reports no suicidal ideation  Patient reports no homicidal " ideation  Patient reports no self-injurious behavior  Patient reports no violent behavior    Verbal deficits: None    Patient's response to intervention:  The patient's response to intervention is accepting.    Progress toward goals and other mental status changes:  The patient's progress toward goals is mixed.    Diagnosis:     ICD-10-CM ICD-9-CM   1. Bipolar disorder, most recent episode depressed F31.30 296.50   2. ERNESTINE (generalized anxiety disorder) F41.1 300.02   History of Alcohol use disorder     Plan:  individual psychotherapy and medication management by physician    Return to clinic: as scheduled     Length of Service (minutes): 45

## 2020-05-25 ENCOUNTER — OFFICE VISIT (OUTPATIENT)
Dept: PSYCHIATRY | Facility: CLINIC | Age: 60
End: 2020-05-25
Payer: COMMERCIAL

## 2020-05-25 DIAGNOSIS — F31.30 BIPOLAR DISORDER, MOST RECENT EPISODE DEPRESSED: Primary | ICD-10-CM

## 2020-05-25 DIAGNOSIS — F41.1 GAD (GENERALIZED ANXIETY DISORDER): ICD-10-CM

## 2020-05-25 PROCEDURE — 90834 PR PSYCHOTHERAPY W/PATIENT, 45 MIN: ICD-10-PCS | Mod: 95,,, | Performed by: SOCIAL WORKER

## 2020-05-25 PROCEDURE — 90834 PSYTX W PT 45 MINUTES: CPT | Mod: 95,,, | Performed by: SOCIAL WORKER

## 2020-05-25 NOTE — PROGRESS NOTES
"Individual Psychotherapy (PhD/LCSW)    5/25/2020    [facility]:  [facility]   --via Virtual visit with synchronous audio and video--Patient presented at home.         Therapeutic Intervention: Met with patient and patient's partner.  Outpatient - Insight oriented psychotherapy 45 min - CPT code 14180 and Outpatient - Supportive psychotherapy 45 min - CPT Code 18765  Each patient to whom medical services by telemedicine is provided is:  (1) informed of the relationship between the physician and patient and the respective role of any other health care provider with respect to management of the patient; and (2) notified that he or she may decline to receive medical services by telemedicine and may withdraw from such care at any time.    Chief complaint/reason for encounter: addictive disorder, depression, mood swings, anxiety and interpersonal     Interval history and content of current session:  60 year old female patient returned for follow up psychotherapy via virtual session at home.  Chief complaint of depression, mood swings, anxiety, and relationship stressors.  Patient brought with her her partner of many years, also named June Stevens, though the patient herself goes by her middle name of Basia.  Discussed patient's endorsement of largely pessimistic thinking patterns throughout her life, which she still contends is the realistic way to view life.  "Glass half empty" approach.  Partner is the opposite and talked at length about examples of that.  Discussed subjectivity of pessimism just as of optimism.  Discussed pointlessness of nihilism that the patient argued for. Patient endorsed some recognition that it is not accurate to see the entire world as all bad. Patient denied any si/hi, psychosis, cognitive deficits, or rages, despite endorsing elevated irritability and low frustration tolerance. Supportive therapy provided.  Plan is to follow up by virtual visit to be scheduled online for about 2 weeks, tba.   "       Treatment plan:  · Target symptoms: depression, anxiety , substance abuse, mood swings, grief, relationship tension  · Why chosen therapy is appropriate versus another modality: relevant to diagnosis, patient responds to this modality  · Outcome monitoring methods: self-report, observation  · Therapeutic intervention type: insight oriented psychotherapy, supportive psychotherapy    Risk parameters:  Patient reports no suicidal ideation  Patient reports no homicidal ideation  Patient reports no self-injurious behavior  Patient reports no violent behavior    Verbal deficits: None    Patient's response to intervention:  The patient's response to intervention is accepting.    Progress toward goals and other mental status changes:  The patient's progress toward goals is mixed.    Diagnosis:     ICD-10-CM ICD-9-CM   1. Bipolar disorder, most recent episode depressed F31.30 296.50   2. ERNESTINE (generalized anxiety disorder) F41.1 300.02   History of Alcohol use disorder     Plan:  individual psychotherapy and medication management by physician    Return to clinic: via virtual visit in approximately 2 weeks, vaishali.    Length of Service (minutes): 45

## 2020-07-13 ENCOUNTER — OFFICE VISIT (OUTPATIENT)
Dept: PSYCHIATRY | Facility: CLINIC | Age: 60
End: 2020-07-13
Payer: COMMERCIAL

## 2020-07-13 DIAGNOSIS — F31.30 BIPOLAR DISORDER, MOST RECENT EPISODE DEPRESSED: Primary | ICD-10-CM

## 2020-07-13 DIAGNOSIS — F41.1 GAD (GENERALIZED ANXIETY DISORDER): ICD-10-CM

## 2020-07-13 PROCEDURE — 90834 PR PSYCHOTHERAPY W/PATIENT, 45 MIN: ICD-10-PCS | Mod: S$GLB,,, | Performed by: SOCIAL WORKER

## 2020-07-13 PROCEDURE — 90834 PSYTX W PT 45 MINUTES: CPT | Mod: S$GLB,,, | Performed by: SOCIAL WORKER

## 2020-07-13 PROCEDURE — 99999 PR PBB SHADOW E&M-EST. PATIENT-LVL I: ICD-10-PCS | Mod: PBBFAC,,, | Performed by: SOCIAL WORKER

## 2020-07-13 PROCEDURE — 99999 PR PBB SHADOW E&M-EST. PATIENT-LVL I: CPT | Mod: PBBFAC,,, | Performed by: SOCIAL WORKER

## 2020-07-20 ENCOUNTER — OFFICE VISIT (OUTPATIENT)
Dept: PSYCHIATRY | Facility: CLINIC | Age: 60
End: 2020-07-20
Payer: COMMERCIAL

## 2020-07-20 DIAGNOSIS — F31.30 BIPOLAR DISORDER, MOST RECENT EPISODE DEPRESSED: Primary | ICD-10-CM

## 2020-07-20 DIAGNOSIS — F41.9 ANXIETY: ICD-10-CM

## 2020-07-20 PROCEDURE — 99213 OFFICE O/P EST LOW 20 MIN: CPT | Mod: 95,,, | Performed by: PSYCHIATRY & NEUROLOGY

## 2020-07-20 PROCEDURE — 99213 PR OFFICE/OUTPT VISIT, EST, LEVL III, 20-29 MIN: ICD-10-PCS | Mod: 95,,, | Performed by: PSYCHIATRY & NEUROLOGY

## 2020-07-20 NOTE — PROGRESS NOTES
"Outpatient Psychiatry Follow-up Visit (MD/NP)    2020    June Stevens, a 60 y.o. female, presenting for follow-up visit. Met with patient.    Reason for Encounter: Chief complaint/reason for encounter: addictive disorder, depression, mood swings, anxiety, behavior and interpersonal    Interval Hx: Patient seen and interviewed for follow-up, about 3 months since last visit. This was a video visit and patient was in [place]. Training for a couch-to-5k  Feeling generally well emotionally. Health is generally ok  No new medication. Caring for mom daily. Still not sleeping as well. Adherent to medication. Denies side effects.     Background: Pt is a 60 y/o F who presents for establishment of psychiatric care, previously evaluated by Mr. Junito Costa. From his note: reporting chief complaint of "my partner thought since I am getting medication, I ought probably to have some talk therapy as well." Pt described a history of mood swings & recurrent depression going back to her teenage years, a recurrent struggle with anger & angry outbursts, & poor coping skills, relying too much on binge drinking (less than weekly, but always to a point of heavy intoxication) to blot out anger & anxiety. She described a fragile self-esteem, pattern of defensiveness, heightened irritability, & frequent depressive cycles. She believes she has abandonment issues from her parents having left for [place] abruptly when she was just 4 years old, leaving her & her younger sister with a grandmother. Mom returned alone after 1 year but never fully embodied a parental figure to the pt. Her grandmother, who represented stability,  when the pt was 27. She recalls mood issues going back to [employer] age, age 12 or 13. She said that was when social issues at school started to become more difficult as she & those around her started noticing she wasn't conforming to stereotypical feminine norms & came to be suspected of being a " "lesbian. She described an emotional struggle between her Religion beliefs & growing awareness of her same-sex orientation. She said she tried to date boys. She reported a lot of peer rejection, being known as "a alcides lesbian," & she attempted suicide at age 17, stepping out in front of an oncoming car. By age 19 she had been introduced to the bhandari bar scene, which she experienced as a crucial affirmation that she isn't alone, but which met with her mother's disapproval; it took her mother years to become more accepting.  She described her current living situation as a split between 2 households, 1 with her partner of 25 years, who is older & "very controlling," & the other with the pt's mother. She reports tension in her primary relationship that involves infidelity on her part back in 2009, an affair stemming from a high school reunion. She said her partner now polices her, & she feels resentful but also doesn't want to leave her partner over it. The pt denied any suicidal or homicidal ideation, though endorsing remote history of a suicidal episode as a teenager.  Denied any cognitive deficit, psychosis, mood swings, or recreational drug use. Endorsed past struggle with some alcohol abuse, a period of abstinence with AA participation, & now periodic binges. Endorses struggle with explosive anger. Identified therapeutic goals include reduction of depression & anxiety, improved self-esteem, & coping skills, including anger management, assertiveness communication skills.        Symptoms:   ? Mood: depressed mood, diminished interest and worthlessness/guilt  ? Anxiety: excessive anxiety/worry, restlessness/keyed up and irritability  ? Substance abuse: substance tolerance and take more than intended  ? Cognitive functioning: denied  ? Health behaviors: some alcohol binges     Psychiatric history: psychotropic management by PCP & prior suicide attempt(s); no chemical abuse treatment reported but did attend AA for years " "and had a sponsor; now relapsed with a binge pattern of use. Family history of psychiatric illness: father bipolar; sister with clinical anxiety. Social history: See history above; oldest of 3; younger brother drowned as an infant. Younger sister is her closest friend. Chaotic childhood; parents were very young; left patient & younger sister with grandmother when she was 4; father never returned; mother came back after one year. Grandmother, an aunt, and uncle were parental figures. Pt described growing social tension in school in middle school and high school, related to her budding identity as a masculine lesbian.  Reported running away from school and home several times in teens. Attempted suicide at 17. Discovered bhandari bars at 19  found the start of a sort of community. 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 Current committed relationship of the past 25 years; described partner as controling, domineering, but "she takes care of me." Endorsed tension of pt's past infidelity a decade ago. Previous longest relationship was for 5 years. No biological children of her own.  Never in a relationship with a man. Loves animals; finds them easier to relate to than people. Currently lives between two houses, one with her partner, the other with her mother.       Substance use:   Alcohol: binge pattern of abuse; denied any known health damage from alcohol   Drugs: none   Tobacco: none   Caffeine: average of 2 and a half cups of coffee daily    Patient confirms the above history. Reports that she's been anxious her "entire life"; overriding distress & impairment. Past dx'es include depression, bipolar disorder (some impulsive shopping). Meds through Dr. Bee Landon for many years, but stopped seeing Dr. Zhong's in summer 2019. Found treatment unaffordable. Off medicine for 3-4 months. "had some left but thought it wasn't working, it makes me gain weight, & I'm sluggish". Thinks that " "these things improved off the medication.     "got an ultimatum" to get back onto medication by her partner who patient says has observed that patient is less irritable & agitated on medication (but she doesn't see this herself).     Endorses "yes" to most items on MDQ    Psych Hx: "anger issues" since a kid. "fought a lot", "usually defending other people", "I couldn't verbally say what I felt, so I thought", though rarely disciplinary problems in school. Protected her sister, friends.     Treated with medication by PCP in  or , dx'ed with depression in - by Dr. Zhong. Diagnosis later changed to bipolar disorder, but she doesn't believe this matches her. She's read about borderline personality & thinks that features of this diagnosis better match her understanding of herself. Was mostly off medication after Dr. Landon left private practice.     Went back to Dr. Landon in  when she became available again, has seen her since & has continued on her current regimen. Was seeing a therapist that works with her, Namita Lopez. They recommended a long-term treatment program in [place] at one point.     Reports mood problems have led her to be passed up for promotions, advancement.     Denies periods of mood elevation that have led to dysfunction or     Family Hx: father   Social Hx: as above. father left at age 6. Wasn't in her life after that, but sought him out at 41, but was disappointed by the relationship because he wanted money from her.   Has a younger sister & younger brother. Brother , drowned in the bathtub.   Lives between two households (between mother's and partner's). Mother is critical.   Master's degree. Works for the state.   No arrests.     Review Of Systems:     GENERAL:  No weight gain or loss  SKIN:  No rashes or lacerations  HEAD:  No headaches  EYES:  No exophthalmos, jaundice or blindness  EARS:  No dizziness, tinnitus or hearing loss  NOSE:  No changes in smell  MOUTH & " THROAT:  No dyskinetic movements or obvious goiter  CHEST:  No shortness of breath, hyperventilation or cough  CARDIOVASCULAR:  No tachycardia or chest pain  ABDOMEN:  No nausea, vomiting, pain, constipation or diarrhea  URINARY:  No frequency, dysuria or sexual dysfunction  ENDOCRINE:  No polydipsia, polyuria  MUSCULOSKELETAL:  No pain or stiffness of the joints  NEUROLOGIC:  No weakness, sensory changes, seizures, confusion, memory loss, tremor or other abnormal movements    Current Evaluation:     Nutritional Screening: Considering the patient's height and weight, medications, medical history and preferences, should a referral be made to the dietitian? no    Constitutional  Vitals:  Most recent vital signs, dated less than 90 days prior to this appointment, were reviewed.       General:  unremarkable, age appropriate     Musculoskeletal  Muscle Strength/Tone:  no tremor, no tic   Gait & Station:  non-ataxic     Psychiatric  Appearance: casually dressed & groomed;   Behavior: calm,   Cooperation: cooperative with assessment  Speech: normal rate, volume, tone  Thought Process: linear, goal-directed  Thought Content: No suicidal or homicidal ideation; no delusions  Affect: normal range  Mood: euthymic  Perceptions: No auditory or visual hallucinations  Level of Consciousness: alert throughout interview  Insight: fair  Cognition: Oriented to person, place, time, & situation  Memory: no apparent deficits to general clinical interview; not formally assessed  Attention/Concentration: no apparent deficits to general clinical interview; not formally assessed  Fund of Knowledge: average by vocabulary/education    Laboratory Data  No visits with results within 1 Month(s) from this visit.   Latest known visit with results is:   Lab Visit on 11/18/2019   Component Date Value Ref Range Status    Sodium 11/18/2019 143  136 - 145 mmol/L Final    Potassium 11/18/2019 4.3  3.5 - 5.1 mmol/L Final    Chloride 11/18/2019 105  95 -  110 mmol/L Final    CO2 11/18/2019 30* 23 - 29 mmol/L Final    Glucose 11/18/2019 82  70 - 110 mg/dL Final    BUN, Bld 11/18/2019 11  6 - 20 mg/dL Final    Creatinine 11/18/2019 0.8  0.5 - 1.4 mg/dL Final    Calcium 11/18/2019 9.2  8.7 - 10.5 mg/dL Final    Total Protein 11/18/2019 7.7  6.0 - 8.4 g/dL Final    Albumin 11/18/2019 3.8  3.5 - 5.2 g/dL Final    Total Bilirubin 11/18/2019 0.4  0.1 - 1.0 mg/dL Final    Alkaline Phosphatase 11/18/2019 108  55 - 135 U/L Final    AST 11/18/2019 17  10 - 40 U/L Final    ALT 11/18/2019 17  10 - 44 U/L Final    Anion Gap 11/18/2019 8  8 - 16 mmol/L Final    eGFR if African American 11/18/2019 >60.0  >60 mL/min/1.73 m^2 Final    eGFR if non African American 11/18/2019 >60.0  >60 mL/min/1.73 m^2 Final    WBC 11/18/2019 5.48  3.90 - 12.70 K/uL Final    RBC 11/18/2019 4.59  4.00 - 5.40 M/uL Final    Hemoglobin 11/18/2019 11.9* 12.0 - 16.0 g/dL Final    Hematocrit 11/18/2019 41.5  37.0 - 48.5 % Final    Mean Corpuscular Volume 11/18/2019 90  82 - 98 fL Final    Mean Corpuscular Hemoglobin 11/18/2019 25.9* 27.0 - 31.0 pg Final    Mean Corpuscular Hemoglobin Conc 11/18/2019 28.7* 32.0 - 36.0 g/dL Final    RDW 11/18/2019 14.8* 11.5 - 14.5 % Final    Platelets 11/18/2019 363* 150 - 350 K/uL Final    MPV 11/18/2019 9.7  9.2 - 12.9 fL Final    Immature Granulocytes 11/18/2019 0.2  0.0 - 0.5 % Final    Gran # (ANC) 11/18/2019 3.3  1.8 - 7.7 K/uL Final    Immature Grans (Abs) 11/18/2019 0.01  0.00 - 0.04 K/uL Final    Lymph # 11/18/2019 1.5  1.0 - 4.8 K/uL Final    Mono # 11/18/2019 0.5  0.3 - 1.0 K/uL Final    Eos # 11/18/2019 0.2  0.0 - 0.5 K/uL Final    Baso # 11/18/2019 0.06  0.00 - 0.20 K/uL Final    nRBC 11/18/2019 0  0 /100 WBC Final    Gran% 11/18/2019 59.7  38.0 - 73.0 % Final    Lymph% 11/18/2019 26.8  18.0 - 48.0 % Final    Mono% 11/18/2019 8.6  4.0 - 15.0 % Final    Eosinophil% 11/18/2019 3.6  0.0 - 8.0 % Final    Basophil% 11/18/2019 1.1   0.0 - 1.9 % Final    Differential Method 11/18/2019 Automated   Final    Cholesterol 11/18/2019 237* 120 - 199 mg/dL Final    Triglycerides 11/18/2019 118  30 - 150 mg/dL Final    HDL 11/18/2019 53  40 - 75 mg/dL Final    LDL Cholesterol 11/18/2019 160.4* 63.0 - 159.0 mg/dL Final    Hdl/Cholesterol Ratio 11/18/2019 22.4  20.0 - 50.0 % Final    Total Cholesterol/HDL Ratio 11/18/2019 4.5  2.0 - 5.0 Final    Non-HDL Cholesterol 11/18/2019 184  mg/dL Final    TSH 11/18/2019 2.102  0.400 - 4.000 uIU/mL Final     Medications  Outpatient Encounter Medications as of 7/20/2020   Medication Sig Dispense Refill    busPIRone (BUSPAR) 30 MG Tab Take 1 tablet (30 mg total) by mouth 2 (two) times daily. 60 tablet 2    lamoTRIgine (LAMICTAL) 150 MG Tab Take 1 tablet (150 mg total) by mouth once daily. 30 tablet 2    mupirocin (BACTROBAN) 2 % ointment by Nasal route 2 (two) times daily. 1 Tube 1    pantoprazole (PROTONIX) 40 MG tablet TAKE 1 TABLET BY MOUTH EVERY MORNING 30 MINUTES BEFORE EATING MEAL 30 tablet 11    traZODone (DESYREL) 100 MG tablet Take 1/2 to 2 tablets at bedtime as needed for sleep. 60 tablet 2     No facility-administered encounter medications on file as of 7/20/2020.      Assessment - Diagnosis - Goals:     Impression: Patient is a 61 y/o F with chronic bipolar disorder, with active major depressive episode despite adherence with medication; has hx of remote substance use disorders.     Dx: bipolar I disorder, mre depressed; anxiety    Treatment Goals:  Specify outcomes written in observable, behavioral terms: prevent recurrences.    Treatment Plan/Recommendations:   · Lamotrigine, buspirone for anxiety. Increase trazodone prn sleep. Clarify anxiety diagnoses.   · Discussed risks, benefits, and alternatives to treatment plan documented above with patient. I answered all patient questions related to this plan and patient expressed understanding and agreement.     Return to Clinic: 2 months    C.  Gato Soriano MD  Psychiatry  [facility]  [address] , [place] [address]  [phone]

## 2020-07-23 RX ORDER — LAMOTRIGINE 150 MG/1
150 TABLET ORAL DAILY
Qty: 30 TABLET | Refills: 2 | Status: SHIPPED | OUTPATIENT
Start: 2020-07-23 | End: 2020-10-19 | Stop reason: SDUPTHER

## 2020-07-23 RX ORDER — TRAZODONE HYDROCHLORIDE 100 MG/1
TABLET ORAL
Qty: 75 TABLET | Refills: 2 | Status: SHIPPED | OUTPATIENT
Start: 2020-07-23 | End: 2020-10-19 | Stop reason: SDUPTHER

## 2020-07-23 RX ORDER — BUSPIRONE HYDROCHLORIDE 30 MG/1
30 TABLET ORAL 2 TIMES DAILY
Qty: 60 TABLET | Refills: 2 | Status: SHIPPED | OUTPATIENT
Start: 2020-07-23 | End: 2020-10-19 | Stop reason: SDUPTHER

## 2020-08-03 ENCOUNTER — OFFICE VISIT (OUTPATIENT)
Dept: PSYCHIATRY | Facility: CLINIC | Age: 60
End: 2020-08-03
Payer: COMMERCIAL

## 2020-08-03 DIAGNOSIS — F41.1 GAD (GENERALIZED ANXIETY DISORDER): ICD-10-CM

## 2020-08-03 DIAGNOSIS — F31.30 BIPOLAR DISORDER, MOST RECENT EPISODE DEPRESSED: Primary | ICD-10-CM

## 2020-08-03 PROCEDURE — 90834 PSYTX W PT 45 MINUTES: CPT | Mod: S$GLB,,, | Performed by: SOCIAL WORKER

## 2020-08-03 PROCEDURE — 90834 PR PSYCHOTHERAPY W/PATIENT, 45 MIN: ICD-10-PCS | Mod: S$GLB,,, | Performed by: SOCIAL WORKER

## 2020-08-03 NOTE — PROGRESS NOTES
Individual Psychotherapy (PhD/LCSW)    7/13/2020    Site:  [facility]         Therapeutic Intervention: Met with patient.  Outpatient - Insight oriented psychotherapy 45 min - CPT code 31473 and Outpatient - Supportive psychotherapy 45 min - CPT Code 75296      Chief complaint/reason for encounter: addictive disorder, depression, mood swings, anxiety and interpersonal     Interval history and content of current session:  60 year old female patient returned in person for follow up psychotherapy to address mood swings, anxiety, and management of current life stressors.  The patient said she came in person this time, because she had to bring her mother to an appointment at [facility] at the same time so was already here.  Patient talkative, reporting on a recent short trip to [place], [place].  She said she was appalled at the lack of mask wearers she saw there.  Discussed the fact of only being able to control one's own choices for acting responsibly, not other people's.  Patient endorsed fair communication recently with her partner; feeling less interpersonal tension at present.  Continues to navigate between her home she shares with her long-time partner and the one she shares with her elderly mother.  Indicated her mother still pushes her emotional buttons frequently, but she is not surprised by it and does not believe she has been allowing her mother to dictate to her too much.  Patient denied any si/hi, psychosis, cognitive deficits, or rages, despite endorsing elevated irritability and low frustration tolerance. Supportive therapy provided.  Plan is to follow up by virtual visit to be scheduled online for about 3 weeks.         Treatment plan:  · Target symptoms: depression, anxiety , substance abuse, mood swings, grief, relationship tension  · Why chosen therapy is appropriate versus another modality: relevant to diagnosis, patient responds to this modality  · Outcome monitoring methods: self-report,  observation  · Therapeutic intervention type: insight oriented psychotherapy, supportive psychotherapy    Risk parameters:  Patient reports no suicidal ideation  Patient reports no homicidal ideation  Patient reports no self-injurious behavior  Patient reports no violent behavior    Verbal deficits: None    Patient's response to intervention:  The patient's response to intervention is accepting.    Progress toward goals and other mental status changes:  The patient's progress toward goals is fair.    Diagnosis:     ICD-10-CM ICD-9-CM   1. Bipolar disorder, most recent episode depressed  F31.30 296.50   2. ERNESTINE (generalized anxiety disorder)  F41.1 300.02   History of Alcohol use disorder     Plan:  individual psychotherapy and medication management by physician    Return to clinic: via virtual visit in approximately 3 weeks.    Length of Service (minutes): 45

## 2020-08-24 ENCOUNTER — OFFICE VISIT (OUTPATIENT)
Dept: PSYCHIATRY | Facility: CLINIC | Age: 60
End: 2020-08-24
Payer: COMMERCIAL

## 2020-08-24 DIAGNOSIS — F41.1 GAD (GENERALIZED ANXIETY DISORDER): ICD-10-CM

## 2020-08-24 DIAGNOSIS — Z63.0 PARTNER RELATIONSHIP PROBLEM: ICD-10-CM

## 2020-08-24 DIAGNOSIS — F31.62 BIPOLAR MIXED AFFECTIVE DISORDER, MODERATE: Primary | ICD-10-CM

## 2020-08-24 PROCEDURE — 90834 PSYTX W PT 45 MINUTES: CPT | Mod: 95,,, | Performed by: SOCIAL WORKER

## 2020-08-24 PROCEDURE — 90834 PR PSYCHOTHERAPY W/PATIENT, 45 MIN: ICD-10-PCS | Mod: 95,,, | Performed by: SOCIAL WORKER

## 2020-08-24 SDOH — SOCIAL DETERMINANTS OF HEALTH (SDOH): PROBLEMS IN RELATIONSHIP WITH SPOUSE OR PARTNER: Z63.0

## 2020-08-24 NOTE — PROGRESS NOTES
Individual Psychotherapy (PhD/LCSW)    8/3/2020    Site:  [facility]   --via Virtual visit with synchronous audio and video--Patient presented at home.         Therapeutic Intervention: Met with patient and patient's partner.  Outpatient - Insight oriented psychotherapy 45 min - CPT code 98158 and Outpatient - Supportive psychotherapy 45 min - CPT Code 34931  Each patient to whom medical services by telemedicine is provided is:  (1) informed of the relationship between the physician and patient and the respective role of any other health care provider with respect to management of the patient; and (2) notified that he or she may decline to receive medical services by telemedicine and may withdraw from such care at any time.    Chief complaint/reason for encounter: addictive disorder, depression, mood swings, anxiety and interpersonal     Interval history and content of current session:  60 year old female patient returned for follow up psychotherapy via virtual session at home.  Chief complaint of mood swings, anxiety and relationship stressors.  The patient presented from her home patio, appearing relaxed at first but somewhat physically restless after several minutes.  She reported that she thinks she is okay but that both her partner and her mother have told her they think she is irritable and agitated at times.  She talked about Covid-19 pandemic and how life has changed as a result of that.  Endorsed longstanding tendency to view situations pessimistically, unlike her partner. Patient denied any si/hi, psychosis, cognitive deficits, or rages, despite endorsing elevated irritability and low frustration tolerance. Supportive therapy provided.  Plan is to follow up by virtual visit to be scheduled online for 3 weeks.         Treatment plan:  · Target symptoms: depression, anxiety , substance abuse, mood swings, grief, relationship tension  · Why chosen therapy is appropriate versus another modality: relevant to  diagnosis, patient responds to this modality  · Outcome monitoring methods: self-report, observation  · Therapeutic intervention type: insight oriented psychotherapy, supportive psychotherapy    Risk parameters:  Patient reports no suicidal ideation  Patient reports no homicidal ideation  Patient reports no self-injurious behavior  Patient reports no violent behavior    Verbal deficits: None    Patient's response to intervention:  The patient's response to intervention is accepting.    Progress toward goals and other mental status changes:  The patient's progress toward goals is mixed.    Diagnosis:     ICD-10-CM ICD-9-CM   1. Bipolar disorder, most recent episode depressed  F31.30 296.50   2. ERNESTINE (generalized anxiety disorder)  F41.1 300.02   History of Alcohol use disorder     Plan:  individual psychotherapy and medication management by physician    Return to clinic: via virtual visit 8/24/20    Length of Service (minutes): 45

## 2020-08-24 NOTE — PROGRESS NOTES
"Individual Psychotherapy (PhD/LCSW)    8/24/2020    Site:  [facility]   --via Virtual visit with synchronous audio and video--Patient presented at home.         Therapeutic Intervention: Met with patient and patient's partner.  Outpatient - Insight oriented psychotherapy 45 min - CPT code 04307 and Outpatient - Supportive psychotherapy 45 min - CPT Code 91728  Each patient to whom medical services by telemedicine is provided is:  (1) informed of the relationship between the physician and patient and the respective role of any other health care provider with respect to management of the patient; and (2) notified that he or she may decline to receive medical services by telemedicine and may withdraw from such care at any time.    Chief complaint/reason for encounter: addictive disorder, depression, mood swings, anxiety and interpersonal     Interval history and content of current session:  60 year old female patient returned for follow up psychotherapy via virtual session at home.  Chief complaint of mood swings, anxiety and relationship stressors.  The patient presented from her home back yard, then settled into a chair on her patio.  Patient reported mood has been ok, feeling somewhat intense about her diet and exercise goals, saying she is in weight watchers and is working on a "couch to 5K" jogging program.  She stated she has lost close to 20 pounds since Erica first.  She stated she knows she has a history of starting and stopping health programs like she is doing now, and she has had conversations with her partner about it.  But she said she is feeling motivated.  Discussed ideas for not getting discouraged if and when she encounters a setback.  She reported feeling mostly positive lately, though she had a depressive "funk" she was in for 4 days in July that caused her to stop all exercise and diet efforts for those few days.  Patient denied any si/hi, psychosis, cognitive deficits, or rages, despite endorsing " elevated irritability and low frustration tolerance. Supportive therapy provided.  Plan is to follow up by virtual visit 9/14/20.           Treatment plan:  · Target symptoms: depression, anxiety , substance abuse, mood swings, grief, relationship tension  · Why chosen therapy is appropriate versus another modality: relevant to diagnosis, patient responds to this modality  · Outcome monitoring methods: self-report, observation  · Therapeutic intervention type: insight oriented psychotherapy, supportive psychotherapy    Risk parameters:  Patient reports no suicidal ideation  Patient reports no homicidal ideation  Patient reports no self-injurious behavior  Patient reports no violent behavior    Verbal deficits: None    Patient's response to intervention:  The patient's response to intervention is accepting.    Progress toward goals and other mental status changes:  The patient's progress toward goals is mixed.    Diagnosis:     ICD-10-CM ICD-9-CM   1. Bipolar mixed affective disorder, moderate  F31.62 296.62   2. ERNESTINE (generalized anxiety disorder)  F41.1 300.02   3. Partner relationship problem  Z63.0 V61.10   History of Alcohol use disorder     Plan:  individual psychotherapy and medication management by physician    Return to clinic: via virtual visit 8/24/20    Length of Service (minutes): 45

## 2020-09-14 ENCOUNTER — OFFICE VISIT (OUTPATIENT)
Dept: PSYCHIATRY | Facility: CLINIC | Age: 60
End: 2020-09-14
Payer: COMMERCIAL

## 2020-09-14 DIAGNOSIS — F41.1 GAD (GENERALIZED ANXIETY DISORDER): ICD-10-CM

## 2020-09-14 DIAGNOSIS — F31.62 BIPOLAR MIXED AFFECTIVE DISORDER, MODERATE: Primary | ICD-10-CM

## 2020-09-14 DIAGNOSIS — Z63.0 PARTNER RELATIONSHIP PROBLEM: ICD-10-CM

## 2020-09-14 PROCEDURE — 90834 PSYTX W PT 45 MINUTES: CPT | Mod: 95,,, | Performed by: SOCIAL WORKER

## 2020-09-14 PROCEDURE — 90834 PR PSYCHOTHERAPY W/PATIENT, 45 MIN: ICD-10-PCS | Mod: 95,,, | Performed by: SOCIAL WORKER

## 2020-09-14 SDOH — SOCIAL DETERMINANTS OF HEALTH (SDOH): PROBLEMS IN RELATIONSHIP WITH SPOUSE OR PARTNER: Z63.0

## 2020-09-14 NOTE — PROGRESS NOTES
Individual Psychotherapy (PhD/LCSW)    8/24/2020    Site:  [place]   --via Virtual visit with synchronous audio and video--Patient presented at home.         Therapeutic Intervention: Met with patient and patient's partner.  Outpatient - Insight oriented psychotherapy 45 min - CPT code 65196 and Outpatient - Supportive psychotherapy 45 min - CPT Code 54404  Each patient to whom medical services by telemedicine is provided is:  (1) informed of the relationship between the physician and patient and the respective role of any other health care provider with respect to management of the patient; and (2) notified that he or she may decline to receive medical services by telemedicine and may withdraw from such care at any time.    Chief complaint/reason for encounter: addictive disorder, depression, mood swings, anxiety and interpersonal     Interval history and content of current session:  60 year old female patient returned for follow up psychotherapy via virtual session at home.  Chief complaint of mood swings, anxiety and relationship stressors.  The patient spent the session processing a tragedy that happened days before.  Her beloved pet dog of 15 years fell into their backyard pool unattended and drowned.  Her partner had left him unattended for just a few minutes.  The patient had previously noticed that the dog was vulnerable to the pool and had stated bluntly that the dog should not be left unattended around the pool.  She said she did not rage at her partner but stayed outwardly calm talking about it.  But she has been tearful about it, grieving.  Talked at length about her memories of how she acquired him.  Patient stated her awareness that she is very emotional, often struggles with her emotions.  Stated this death of her dog just over a week ago has triggered to think about her own mother's advance age--in her 80s, smoking heavily and eating badly.  Patient fears losing her mother soon.  Talked about  occasional recurrent moments of hopelessness the patient has felt over the years and what she hangs onto to see her through each time, and she said it comes down to relationships and not wanting to abandon those she loves.   Patient denied any si/hi, psychosis, cognitive deficits, or rages.. Supportive therapy provided.  Plan is to follow up by virtual visit 10/5/20.           Treatment plan:  · Target symptoms: depression, anxiety , substance abuse, mood swings, grief, relationship tension  · Why chosen therapy is appropriate versus another modality: relevant to diagnosis, patient responds to this modality  · Outcome monitoring methods: self-report, observation  · Therapeutic intervention type: insight oriented psychotherapy, supportive psychotherapy    Risk parameters:  Patient reports no suicidal ideation  Patient reports no homicidal ideation  Patient reports no self-injurious behavior  Patient reports no violent behavior    Verbal deficits: None    Patient's response to intervention:  The patient's response to intervention is accepting.    Progress toward goals and other mental status changes:  The patient's progress toward goals is mixed.    Diagnosis:     ICD-10-CM ICD-9-CM   1. Bipolar mixed affective disorder, moderate  F31.62 296.62   2. ERNESTINE (generalized anxiety disorder)  F41.1 300.02   3. Partner relationship problem  Z63.0 V61.10   History of Alcohol use disorder     Plan:  individual psychotherapy and medication management by physician    Return to clinic: as scheduled    Length of Service (minutes): 45

## 2020-10-05 ENCOUNTER — OFFICE VISIT (OUTPATIENT)
Dept: PSYCHIATRY | Facility: CLINIC | Age: 60
End: 2020-10-05
Payer: COMMERCIAL

## 2020-10-05 DIAGNOSIS — Z63.0 PARTNER RELATIONSHIP PROBLEM: ICD-10-CM

## 2020-10-05 DIAGNOSIS — F31.62 BIPOLAR MIXED AFFECTIVE DISORDER, MODERATE: Primary | ICD-10-CM

## 2020-10-05 DIAGNOSIS — F41.1 GAD (GENERALIZED ANXIETY DISORDER): ICD-10-CM

## 2020-10-05 PROCEDURE — 90834 PR PSYCHOTHERAPY W/PATIENT, 45 MIN: ICD-10-PCS | Mod: S$GLB,,, | Performed by: SOCIAL WORKER

## 2020-10-05 PROCEDURE — 90834 PSYTX W PT 45 MINUTES: CPT | Mod: S$GLB,,, | Performed by: SOCIAL WORKER

## 2020-10-05 SDOH — SOCIAL DETERMINANTS OF HEALTH (SDOH): PROBLEMS IN RELATIONSHIP WITH SPOUSE OR PARTNER: Z63.0

## 2020-10-05 NOTE — PROGRESS NOTES
Individual Psychotherapy (PhD/LCSW)    8/24/2020    Site:  [place]       Therapeutic Intervention: Met with patient.  Outpatient - Insight oriented psychotherapy 45 min - CPT code 23046 and Outpatient - Supportive psychotherapy 45 min - CPT Code 33707    Chief complaint/reason for encounter: depression, mood swings, anxiety, grief and loss, and interpersonal     Interval history and content of current session:  60 year old female patient returned for follow up psychotherapy.  Patient presented to the [facility] location for the first time.  Casually dressed, pleasant, but reporting feeling distress.  She has had a second pet dog die, this one 5 days earlier.  The previous month she had reported another of her dogs had drowned.  She talked about the loss of pets being very difficult for her emotionally.  She tends to blame herself, stating she believes in Karma, and she immediately expects she is being punished by God when anything bad or sad happens.  She said even though she now doubts the existence of God, she does fear being punished for her disbelief or otherwise for anything she might have done, even if she does not know what that is.  Discussed origins of her belief, which she attributed to blaming methods her mother used raising her.  Discussed thought habits and the steady, gradual process of refuting and replacing them diligently over time.  Patient denied any si/hi, psychosis, cognitive deficits, or rages.. Supportive therapy provided.  Plan is to follow up for psychotherapy 10/26/20.           Treatment plan:  · Target symptoms: depression, anxiety , substance abuse, mood swings, grief, relationship tension  · Why chosen therapy is appropriate versus another modality: relevant to diagnosis, patient responds to this modality  · Outcome monitoring methods: self-report, observation  · Therapeutic intervention type: insight oriented psychotherapy, supportive psychotherapy    Risk parameters:  Patient  reports no suicidal ideation  Patient reports no homicidal ideation  Patient reports no self-injurious behavior  Patient reports no violent behavior    Verbal deficits: None    Patient's response to intervention:  The patient's response to intervention is accepting.    Progress toward goals and other mental status changes:  The patient's progress toward goals is mixed.    Diagnosis:     ICD-10-CM ICD-9-CM   1. Bipolar mixed affective disorder, moderate  F31.62 296.62   2. ERNESTINE (generalized anxiety disorder)  F41.1 300.02   3. Partner relationship problem  Z63.0 V61.10   History of Alcohol use disorder     Plan:  individual psychotherapy and medication management by physician    Return to clinic: as scheduled    Length of Service (minutes): 45

## 2020-10-07 ENCOUNTER — IMMUNIZATION (OUTPATIENT)
Dept: FAMILY MEDICINE | Facility: CLINIC | Age: 60
End: 2020-10-07
Payer: COMMERCIAL

## 2020-10-07 PROCEDURE — 90471 IMMUNIZATION ADMIN: CPT | Mod: S$GLB,,, | Performed by: REGISTERED NURSE

## 2020-10-07 PROCEDURE — 90686 IIV4 VACC NO PRSV 0.5 ML IM: CPT | Mod: S$GLB,,, | Performed by: REGISTERED NURSE

## 2020-10-07 PROCEDURE — 99999 PR PBB SHADOW E&M-EST. PATIENT-LVL I: ICD-10-PCS | Mod: PBBFAC,,,

## 2020-10-07 PROCEDURE — 99999 PR PBB SHADOW E&M-EST. PATIENT-LVL I: CPT | Mod: PBBFAC,,,

## 2020-10-07 PROCEDURE — 90471 FLU VACCINE (QUAD) GREATER THAN OR EQUAL TO 3YO PRESERVATIVE FREE IM: ICD-10-PCS | Mod: S$GLB,,, | Performed by: REGISTERED NURSE

## 2020-10-07 PROCEDURE — 90686 FLU VACCINE (QUAD) GREATER THAN OR EQUAL TO 3YO PRESERVATIVE FREE IM: ICD-10-PCS | Mod: S$GLB,,, | Performed by: REGISTERED NURSE

## 2020-10-19 ENCOUNTER — OFFICE VISIT (OUTPATIENT)
Dept: PSYCHIATRY | Facility: CLINIC | Age: 60
End: 2020-10-19
Payer: COMMERCIAL

## 2020-10-19 DIAGNOSIS — G47.00 INSOMNIA, UNSPECIFIED TYPE: ICD-10-CM

## 2020-10-19 DIAGNOSIS — F41.9 ANXIETY: ICD-10-CM

## 2020-10-19 DIAGNOSIS — F31.30 BIPOLAR AFFECTIVE DISORDER, CURRENT EPISODE DEPRESSED, CURRENT EPISODE SEVERITY UNSPECIFIED: Primary | ICD-10-CM

## 2020-10-19 PROCEDURE — 99213 PR OFFICE/OUTPT VISIT, EST, LEVL III, 20-29 MIN: ICD-10-PCS | Mod: 95,,, | Performed by: PSYCHIATRY & NEUROLOGY

## 2020-10-19 PROCEDURE — 99213 OFFICE O/P EST LOW 20 MIN: CPT | Mod: 95,,, | Performed by: PSYCHIATRY & NEUROLOGY

## 2020-10-19 RX ORDER — BUSPIRONE HYDROCHLORIDE 30 MG/1
30 TABLET ORAL 2 TIMES DAILY
Qty: 60 TABLET | Refills: 3 | Status: SHIPPED | OUTPATIENT
Start: 2020-10-19 | End: 2021-02-22 | Stop reason: SDUPTHER

## 2020-10-19 RX ORDER — LAMOTRIGINE 150 MG/1
150 TABLET ORAL DAILY
Qty: 30 TABLET | Refills: 3 | Status: SHIPPED | OUTPATIENT
Start: 2020-10-19 | End: 2021-02-22 | Stop reason: SDUPTHER

## 2020-10-19 RX ORDER — TRAZODONE HYDROCHLORIDE 100 MG/1
TABLET ORAL
Qty: 75 TABLET | Refills: 3 | Status: SHIPPED | OUTPATIENT
Start: 2020-10-19 | End: 2021-02-22
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
## 2020-10-26 ENCOUNTER — OFFICE VISIT (OUTPATIENT)
Dept: PSYCHIATRY | Facility: CLINIC | Age: 60
End: 2020-10-26
Payer: COMMERCIAL

## 2020-10-26 DIAGNOSIS — F41.1 GAD (GENERALIZED ANXIETY DISORDER): ICD-10-CM

## 2020-10-26 DIAGNOSIS — F31.62 BIPOLAR MIXED AFFECTIVE DISORDER, MODERATE: Primary | ICD-10-CM

## 2020-10-26 PROCEDURE — 90834 PR PSYCHOTHERAPY W/PATIENT, 45 MIN: ICD-10-PCS | Mod: S$GLB,,, | Performed by: SOCIAL WORKER

## 2020-10-26 PROCEDURE — 90834 PSYTX W PT 45 MINUTES: CPT | Mod: S$GLB,,, | Performed by: SOCIAL WORKER

## 2020-10-26 NOTE — PROGRESS NOTES
"Individual Psychotherapy (PhD/LCSW)    8/24/2020    Site:  [facility]       Therapeutic Intervention: Met with patient.  Outpatient - Insight oriented psychotherapy 45 min - CPT code 63373 and Outpatient - Supportive psychotherapy 45 min - CPT Code 09460    Chief complaint/reason for encounter: depression, mood swings, anxiety, grief and loss, and interpersonal     Interval history and content of current session:  60 year old female patient seen in person in clinic for follow up to address mood disorder and stress management.  Patient alert, good eye contact, casually dressed, talked initially about her mother's health concerns, as she has to bring her mother for a separate clinic appointment today.  She reported steady to positive marital relations; "nothing to talk about there today."  Patient endorsed current events in the wider society as a significant stressor for her, such that she has tried to cope by largely swearing off watching any news and instead relying on word of mouth mentions from loved ones.  She stated she does feel good about having already Discussed specific examples of patient putting into practice serenity prayer principles.   Patient denied any si/hi, psychosis, cognitive deficits, or rages.. Supportive therapy provided.  Plan is to follow up for psychotherapy 11/16/20.           Treatment plan:  · Target symptoms: depression, anxiety , substance abuse, mood swings, grief, relationship tension  · Why chosen therapy is appropriate versus another modality: relevant to diagnosis, patient responds to this modality  · Outcome monitoring methods: self-report, observation  · Therapeutic intervention type: insight oriented psychotherapy, supportive psychotherapy    Risk parameters:  Patient reports no suicidal ideation  Patient reports no homicidal ideation  Patient reports no self-injurious behavior  Patient reports no violent behavior    Verbal deficits: None    Patient's response to " intervention:  The patient's response to intervention is accepting.    Progress toward goals and other mental status changes:  The patient's progress toward goals is mixed.    Diagnosis:     ICD-10-CM ICD-9-CM   1. Bipolar mixed affective disorder, moderate  F31.62 296.62   2. ERNESTINE (generalized anxiety disorder)  F41.1 300.02   History of Alcohol use disorder     Plan:  individual psychotherapy and medication management by physician    Return to clinic: as scheduled    Length of Service (minutes): 45

## 2020-11-16 ENCOUNTER — OFFICE VISIT (OUTPATIENT)
Dept: PSYCHIATRY | Facility: CLINIC | Age: 60
End: 2020-11-16
Payer: COMMERCIAL

## 2020-11-16 DIAGNOSIS — F31.62 BIPOLAR MIXED AFFECTIVE DISORDER, MODERATE: Primary | ICD-10-CM

## 2020-11-16 DIAGNOSIS — Z63.0 PARTNER RELATIONSHIP PROBLEM: ICD-10-CM

## 2020-11-16 DIAGNOSIS — F41.1 GAD (GENERALIZED ANXIETY DISORDER): ICD-10-CM

## 2020-11-16 PROCEDURE — 90834 PSYTX W PT 45 MINUTES: CPT | Mod: S$GLB,,, | Performed by: SOCIAL WORKER

## 2020-11-16 PROCEDURE — 99999 PR PBB SHADOW E&M-EST. PATIENT-LVL I: ICD-10-PCS | Mod: PBBFAC,,, | Performed by: SOCIAL WORKER

## 2020-11-16 PROCEDURE — 90834 PR PSYCHOTHERAPY W/PATIENT, 45 MIN: ICD-10-PCS | Mod: S$GLB,,, | Performed by: SOCIAL WORKER

## 2020-11-16 PROCEDURE — 99999 PR PBB SHADOW E&M-EST. PATIENT-LVL I: CPT | Mod: PBBFAC,,, | Performed by: SOCIAL WORKER

## 2020-11-16 SDOH — SOCIAL DETERMINANTS OF HEALTH (SDOH): PROBLEMS IN RELATIONSHIP WITH SPOUSE OR PARTNER: Z63.0

## 2020-11-24 ENCOUNTER — PATIENT MESSAGE (OUTPATIENT)
Dept: FAMILY MEDICINE | Facility: CLINIC | Age: 60
End: 2020-11-24

## 2020-11-24 DIAGNOSIS — Z00.00 PREVENTATIVE HEALTH CARE: Primary | ICD-10-CM

## 2020-11-30 ENCOUNTER — LAB VISIT (OUTPATIENT)
Dept: LAB | Facility: HOSPITAL | Age: 60
End: 2020-11-30
Attending: FAMILY MEDICINE
Payer: COMMERCIAL

## 2020-11-30 DIAGNOSIS — Z00.00 PREVENTATIVE HEALTH CARE: ICD-10-CM

## 2020-11-30 LAB
ALBUMIN SERPL BCP-MCNC: 3.8 G/DL (ref 3.5–5.2)
ALP SERPL-CCNC: 108 U/L (ref 55–135)
ALT SERPL W/O P-5'-P-CCNC: 20 U/L (ref 10–44)
ANION GAP SERPL CALC-SCNC: 6 MMOL/L (ref 8–16)
AST SERPL-CCNC: 23 U/L (ref 10–40)
BASOPHILS # BLD AUTO: 0.07 K/UL (ref 0–0.2)
BASOPHILS NFR BLD: 1.2 % (ref 0–1.9)
BILIRUB SERPL-MCNC: 0.4 MG/DL (ref 0.1–1)
BUN SERPL-MCNC: 11 MG/DL (ref 6–20)
CALCIUM SERPL-MCNC: 8.9 MG/DL (ref 8.7–10.5)
CHLORIDE SERPL-SCNC: 106 MMOL/L (ref 95–110)
CHOLEST SERPL-MCNC: 234 MG/DL (ref 120–199)
CHOLEST/HDLC SERPL: 4.3 {RATIO} (ref 2–5)
CO2 SERPL-SCNC: 29 MMOL/L (ref 23–29)
CREAT SERPL-MCNC: 1 MG/DL (ref 0.5–1.4)
DIFFERENTIAL METHOD: ABNORMAL
EOSINOPHIL # BLD AUTO: 0.3 K/UL (ref 0–0.5)
EOSINOPHIL NFR BLD: 5.8 % (ref 0–8)
ERYTHROCYTE [DISTWIDTH] IN BLOOD BY AUTOMATED COUNT: 15 % (ref 11.5–14.5)
EST. GFR  (AFRICAN AMERICAN): >60 ML/MIN/1.73 M^2
EST. GFR  (NON AFRICAN AMERICAN): >60 ML/MIN/1.73 M^2
GLUCOSE SERPL-MCNC: 97 MG/DL (ref 70–110)
HCT VFR BLD AUTO: 41 % (ref 37–48.5)
HDLC SERPL-MCNC: 55 MG/DL (ref 40–75)
HDLC SERPL: 23.5 % (ref 20–50)
HGB BLD-MCNC: 12.8 G/DL (ref 12–16)
IMM GRANULOCYTES # BLD AUTO: 0.01 K/UL (ref 0–0.04)
IMM GRANULOCYTES NFR BLD AUTO: 0.2 % (ref 0–0.5)
LDLC SERPL CALC-MCNC: 147.4 MG/DL (ref 63–159)
LYMPHOCYTES # BLD AUTO: 1.8 K/UL (ref 1–4.8)
LYMPHOCYTES NFR BLD: 30.9 % (ref 18–48)
MCH RBC QN AUTO: 26.9 PG (ref 27–31)
MCHC RBC AUTO-ENTMCNC: 31.2 G/DL (ref 32–36)
MCV RBC AUTO: 86 FL (ref 82–98)
MONOCYTES # BLD AUTO: 0.5 K/UL (ref 0.3–1)
MONOCYTES NFR BLD: 8.5 % (ref 4–15)
NEUTROPHILS # BLD AUTO: 3.1 K/UL (ref 1.8–7.7)
NEUTROPHILS NFR BLD: 53.4 % (ref 38–73)
NONHDLC SERPL-MCNC: 179 MG/DL
NRBC BLD-RTO: 0 /100 WBC
PLATELET # BLD AUTO: 329 K/UL (ref 150–350)
PMV BLD AUTO: 10.3 FL (ref 9.2–12.9)
POTASSIUM SERPL-SCNC: 4.5 MMOL/L (ref 3.5–5.1)
PROT SERPL-MCNC: 7.7 G/DL (ref 6–8.4)
RBC # BLD AUTO: 4.75 M/UL (ref 4–5.4)
SODIUM SERPL-SCNC: 141 MMOL/L (ref 136–145)
TRIGL SERPL-MCNC: 158 MG/DL (ref 30–150)
TSH SERPL DL<=0.005 MIU/L-ACNC: 3.21 UIU/ML (ref 0.4–4)
WBC # BLD AUTO: 5.86 K/UL (ref 3.9–12.7)

## 2020-11-30 PROCEDURE — 80061 LIPID PANEL: CPT

## 2020-11-30 PROCEDURE — 36415 COLL VENOUS BLD VENIPUNCTURE: CPT | Mod: PO

## 2020-11-30 PROCEDURE — 80053 COMPREHEN METABOLIC PANEL: CPT

## 2020-11-30 PROCEDURE — 84443 ASSAY THYROID STIM HORMONE: CPT

## 2020-11-30 PROCEDURE — 85025 COMPLETE CBC W/AUTO DIFF WBC: CPT

## 2020-12-07 ENCOUNTER — OFFICE VISIT (OUTPATIENT)
Dept: PSYCHIATRY | Facility: CLINIC | Age: 60
End: 2020-12-07
Payer: COMMERCIAL

## 2020-12-07 DIAGNOSIS — Z63.9 RELATIONSHIP PROBLEMS: ICD-10-CM

## 2020-12-07 DIAGNOSIS — F31.62 BIPOLAR MIXED AFFECTIVE DISORDER, MODERATE: Primary | ICD-10-CM

## 2020-12-07 DIAGNOSIS — F41.1 GAD (GENERALIZED ANXIETY DISORDER): ICD-10-CM

## 2020-12-07 PROCEDURE — 99999 PR PBB SHADOW E&M-EST. PATIENT-LVL I: CPT | Mod: PBBFAC,,, | Performed by: SOCIAL WORKER

## 2020-12-07 PROCEDURE — 90834 PSYTX W PT 45 MINUTES: CPT | Mod: S$GLB,,, | Performed by: SOCIAL WORKER

## 2020-12-07 PROCEDURE — 99999 PR PBB SHADOW E&M-EST. PATIENT-LVL I: ICD-10-PCS | Mod: PBBFAC,,, | Performed by: SOCIAL WORKER

## 2020-12-07 PROCEDURE — 90834 PR PSYCHOTHERAPY W/PATIENT, 45 MIN: ICD-10-PCS | Mod: S$GLB,,, | Performed by: SOCIAL WORKER

## 2020-12-07 SDOH — SOCIAL DETERMINANTS OF HEALTH (SDOH): PROBLEM RELATED TO PRIMARY SUPPORT GROUP, UNSPECIFIED: Z63.9
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
## 2020-12-08 ENCOUNTER — PATIENT MESSAGE (OUTPATIENT)
Dept: FAMILY MEDICINE | Facility: CLINIC | Age: 60
End: 2020-12-08

## 2020-12-08 ENCOUNTER — OFFICE VISIT (OUTPATIENT)
Dept: FAMILY MEDICINE | Facility: CLINIC | Age: 60
End: 2020-12-08
Payer: COMMERCIAL

## 2020-12-08 VITALS
BODY MASS INDEX: 30.86 KG/M2 | HEART RATE: 107 BPM | HEIGHT: 66 IN | WEIGHT: 192 LBS | OXYGEN SATURATION: 97 % | DIASTOLIC BLOOD PRESSURE: 80 MMHG | SYSTOLIC BLOOD PRESSURE: 112 MMHG | TEMPERATURE: 98 F

## 2020-12-08 DIAGNOSIS — K21.9 GASTROESOPHAGEAL REFLUX DISEASE, UNSPECIFIED WHETHER ESOPHAGITIS PRESENT: ICD-10-CM

## 2020-12-08 DIAGNOSIS — Z00.00 PREVENTATIVE HEALTH CARE: Primary | ICD-10-CM

## 2020-12-08 DIAGNOSIS — Z12.31 ENCOUNTER FOR SCREENING MAMMOGRAM FOR MALIGNANT NEOPLASM OF BREAST: ICD-10-CM

## 2020-12-08 DIAGNOSIS — F31.30 BIPOLAR AFFECTIVE DISORDER, CURRENT EPISODE DEPRESSED, CURRENT EPISODE SEVERITY UNSPECIFIED: ICD-10-CM

## 2020-12-08 DIAGNOSIS — Z23 NEED FOR VACCINATION: Primary | ICD-10-CM

## 2020-12-08 DIAGNOSIS — E78.2 MIXED HYPERLIPIDEMIA: ICD-10-CM

## 2020-12-08 DIAGNOSIS — F41.1 GENERALIZED ANXIETY DISORDER: ICD-10-CM

## 2020-12-08 PROCEDURE — 1126F PR PAIN SEVERITY QUANTIFIED, NO PAIN PRESENT: ICD-10-PCS | Mod: S$GLB,,, | Performed by: FAMILY MEDICINE

## 2020-12-08 PROCEDURE — 99396 PREV VISIT EST AGE 40-64: CPT | Mod: S$GLB,,, | Performed by: FAMILY MEDICINE

## 2020-12-08 PROCEDURE — 99999 PR PBB SHADOW E&M-EST. PATIENT-LVL III: CPT | Mod: PBBFAC,,, | Performed by: FAMILY MEDICINE

## 2020-12-08 PROCEDURE — 99396 PR PREVENTIVE VISIT,EST,40-64: ICD-10-PCS | Mod: S$GLB,,, | Performed by: FAMILY MEDICINE

## 2020-12-08 PROCEDURE — 3008F BODY MASS INDEX DOCD: CPT | Mod: CPTII,S$GLB,, | Performed by: FAMILY MEDICINE

## 2020-12-08 PROCEDURE — 1126F AMNT PAIN NOTED NONE PRSNT: CPT | Mod: S$GLB,,, | Performed by: FAMILY MEDICINE

## 2020-12-08 PROCEDURE — 3008F PR BODY MASS INDEX (BMI) DOCUMENTED: ICD-10-PCS | Mod: CPTII,S$GLB,, | Performed by: FAMILY MEDICINE

## 2020-12-08 PROCEDURE — 99999 PR PBB SHADOW E&M-EST. PATIENT-LVL III: ICD-10-PCS | Mod: PBBFAC,,, | Performed by: FAMILY MEDICINE

## 2020-12-08 RX ORDER — QUETIAPINE FUMARATE 25 MG/1
TABLET, FILM COATED ORAL
COMMUNITY
Start: 2020-11-04 | End: 2021-02-02 | Stop reason: SDUPTHER

## 2020-12-08 NOTE — TELEPHONE ENCOUNTER
Pt would like a shingrix shot can you please put a order in so pt may come in and get the vaccination. Please advise.     LV 12/08/20

## 2020-12-08 NOTE — PROGRESS NOTES
CHIEF COMPLAINT:  This is a 59-year-old female here for preventive health exam.     SUBJECTIVE: The patient is doing well without complaints. Patient takes pantoprazole for GERD and Seroquel, Lamictal, Buspar and trazodone for bipolar disorder. She has a history of mixed hyperlipidemia.     Eye exam August 2017. Mammogram February 2020.  Pap smear November 2019 due November 2022. Colonoscopy August 2011, due again in August 2021.  Tdap October 2015.  Flu vaccine October 2020.     ROS:  GENERAL: Patient denies fever, chills, night sweats. Patient denies weight loss.  Patient denies anorexia, fatigue, weakness or swollen glands.  SKIN: Patient denies rash or hair loss.  HEENT: Patient denies sore throat, ear pain, hearing loss, nasal congestion, or runny nose. Patient denies visual disturbance, eye irritation or discharge.  LUNGS: Patient denies wheeze or hemoptysis.  CARDIOVASCULAR: Patient denies chest pain, shortness of breath, palpitations, syncope or lower extremity edema.  GI: Patient denies abdominal pain, nausea, vomiting, diarrhea, constipation, blood in stool or melena.  GENITOURINARY: Patient denies pelvic pain, vaginal discharge, itch or odor. Patient denies irregular vaginal bleeding. Patient denies dysuria, frequency, hematuria, nocturia, urgency or incontinence.  BREASTS: Patient denies breast pain, mass or nipple discharge.  MUSCULOSKELETAL: Patient denies joint pain, swelling, redness or warmth.  NEUROLOGIC: Patient denies headache, vertigo, paresthesias, weakness in limb, dysarthria, dysphagia or abnormality of gait.  PSYCHIATRIC: Patient denies anxiety, depression, or memory loss.     OBJECTIVE:   GENERAL: Well-developed well-nourished obese white female alert and oriented x3, in no acute distress. Memory, judgment and cognition without deficit.   SKIN: Clear without rash. Normal color and tone.  Purulent lesion palmar surface right hand just proximal to the 5th MCP joint.  Granulation tissue.   Minimal blood-tinged purulent discharge evacuated.  HEENT: Eyes: Clear conjunctivae. No scleral icterus. Pupils equal reactive to light and accommodation. Ears: Clear canals. Clear TMs. Nose:  Crusty scabbed lesion left nares.  Without congestion. Pharynx: Without injection or exudates.  NECK: Supple, normal range of motion. No masses, lymphadenopathy or enlarged thyroid. No JVD. Carotids 2+ and equal. No bruits.  LUNGS: Clear to auscultation. Normal respiratory effort.  CARDIOVASCULAR: Regular rhythm, normal S1, S2 without murmur, gallop or rub.  BACK: No CVA or spinal tenderness.  BREASTS: No masses or nipple discharge. Tenderness to palpation right greater than left breast.  ABDOMEN: Normal appearance. Active bowel sounds. Soft, nontender without mass or organomegaly. No rebound or guarding.  EXTREMITIES: Without cyanosis, clubbing or edema. Distal pulses 2+ and equal. Normal range of motion in all extremities. No joint effusion, erythema or warmth.  NEUROLOGIC: Cranial nerves II through XII without deficit. Motor strength equal bilaterally. Sensation normal to touch. Deep tendon reflexes 2+ and equal. Gait without abnormality. No tremor. Negative cerebellar signs.  PELVIC: External: Without lesions or inflammation. Vaginal: Clear. Cervix: Normal appearance, nontender.  No Pap.  Uterus: Normal size and shape. Adnexa: Non-tender, without masses. Rectovaginal: Confirms, heme-negative stool x2.    Recent lab reviewed and acceptable except for borderline elevated LDL cholesterol and triglycerides.    ASSESSMENT:  1. Preventative health care    2. Gastroesophageal reflux disease, unspecified whether esophagitis present    3. Mixed hyperlipidemia    4. Generalized anxiety disorder    5. Bipolar affective disorder, current episode depressed, current episode severity unspecified    6. Encounter for screening mammogram for malignant neoplasm of breast        PLAN:  1.  Weight reduction. Exercise regularly.  2.   Age-appropriate counseling.  3.  Mammogram.  4.  Refill pantoprazole.  5.  Follow up annually.     This note is generated with speech recognition software and is subject to transcription error and sound alike phrases that may be missed by proofreading.

## 2020-12-14 ENCOUNTER — CLINICAL SUPPORT (OUTPATIENT)
Dept: FAMILY MEDICINE | Facility: CLINIC | Age: 60
End: 2020-12-14
Payer: COMMERCIAL

## 2020-12-14 PROCEDURE — 90471 ZOSTER RECOMBINANT VACCINE: ICD-10-PCS | Mod: S$GLB,,, | Performed by: FAMILY MEDICINE

## 2020-12-14 PROCEDURE — 99999 PR PBB SHADOW E&M-EST. PATIENT-LVL I: CPT | Mod: PBBFAC,,,

## 2020-12-14 PROCEDURE — 90750 ZOSTER RECOMBINANT VACCINE: ICD-10-PCS | Mod: S$GLB,,, | Performed by: FAMILY MEDICINE

## 2020-12-14 PROCEDURE — 99999 PR PBB SHADOW E&M-EST. PATIENT-LVL I: ICD-10-PCS | Mod: PBBFAC,,,

## 2020-12-14 PROCEDURE — 90750 HZV VACC RECOMBINANT IM: CPT | Mod: S$GLB,,, | Performed by: FAMILY MEDICINE

## 2020-12-14 PROCEDURE — 90471 IMMUNIZATION ADMIN: CPT | Mod: S$GLB,,, | Performed by: FAMILY MEDICINE

## 2020-12-15 RX ORDER — PANTOPRAZOLE SODIUM 40 MG/1
TABLET, DELAYED RELEASE ORAL
Qty: 30 TABLET | Refills: 11 | Status: SHIPPED | OUTPATIENT
Start: 2020-12-15 | End: 2021-12-23

## 2020-12-28 ENCOUNTER — OFFICE VISIT (OUTPATIENT)
Dept: FAMILY MEDICINE | Facility: CLINIC | Age: 60
End: 2020-12-28
Payer: COMMERCIAL

## 2020-12-28 VITALS
SYSTOLIC BLOOD PRESSURE: 124 MMHG | HEIGHT: 66 IN | DIASTOLIC BLOOD PRESSURE: 78 MMHG | BODY MASS INDEX: 31.43 KG/M2 | WEIGHT: 195.56 LBS | TEMPERATURE: 98 F | HEART RATE: 92 BPM

## 2020-12-28 DIAGNOSIS — R10.9 ACUTE LEFT FLANK PAIN: Primary | ICD-10-CM

## 2020-12-28 PROCEDURE — 1125F AMNT PAIN NOTED PAIN PRSNT: CPT | Mod: S$GLB,,, | Performed by: REGISTERED NURSE

## 2020-12-28 PROCEDURE — 3008F BODY MASS INDEX DOCD: CPT | Mod: CPTII,S$GLB,, | Performed by: REGISTERED NURSE

## 2020-12-28 PROCEDURE — 1125F PR PAIN SEVERITY QUANTIFIED, PAIN PRESENT: ICD-10-PCS | Mod: S$GLB,,, | Performed by: REGISTERED NURSE

## 2020-12-28 PROCEDURE — 81001 URINALYSIS AUTO W/SCOPE: CPT

## 2020-12-28 PROCEDURE — 99999 PR PBB SHADOW E&M-EST. PATIENT-LVL III: CPT | Mod: PBBFAC,,, | Performed by: REGISTERED NURSE

## 2020-12-28 PROCEDURE — 99999 PR PBB SHADOW E&M-EST. PATIENT-LVL III: ICD-10-PCS | Mod: PBBFAC,,, | Performed by: REGISTERED NURSE

## 2020-12-28 PROCEDURE — 3008F PR BODY MASS INDEX (BMI) DOCUMENTED: ICD-10-PCS | Mod: CPTII,S$GLB,, | Performed by: REGISTERED NURSE

## 2020-12-28 PROCEDURE — 99214 OFFICE O/P EST MOD 30 MIN: CPT | Mod: S$GLB,,, | Performed by: REGISTERED NURSE

## 2020-12-28 PROCEDURE — 99214 PR OFFICE/OUTPT VISIT, EST, LEVL IV, 30-39 MIN: ICD-10-PCS | Mod: S$GLB,,, | Performed by: REGISTERED NURSE

## 2020-12-28 RX ORDER — PHENAZOPYRIDINE HYDROCHLORIDE 200 MG/1
200 TABLET, FILM COATED ORAL
Qty: 6 TABLET | Refills: 0 | Status: SHIPPED | OUTPATIENT
Start: 2020-12-28 | End: 2020-12-30

## 2020-12-28 RX ORDER — IBUPROFEN 800 MG/1
800 TABLET ORAL 3 TIMES DAILY PRN
Qty: 60 TABLET | Refills: 0 | Status: SHIPPED | OUTPATIENT
Start: 2020-12-28

## 2020-12-29 LAB
BACTERIA #/AREA URNS AUTO: ABNORMAL /HPF
BILIRUB UR QL STRIP: NEGATIVE
CLARITY UR REFRACT.AUTO: ABNORMAL
COLOR UR AUTO: YELLOW
GLUCOSE UR QL STRIP: NEGATIVE
HGB UR QL STRIP: NEGATIVE
KETONES UR QL STRIP: NEGATIVE
LEUKOCYTE ESTERASE UR QL STRIP: ABNORMAL
MICROSCOPIC COMMENT: ABNORMAL
NITRITE UR QL STRIP: NEGATIVE
PH UR STRIP: 5 [PH] (ref 5–8)
PROT UR QL STRIP: NEGATIVE
RBC #/AREA URNS AUTO: 0 /HPF (ref 0–4)
SP GR UR STRIP: 1.02 (ref 1–1.03)
SQUAMOUS #/AREA URNS AUTO: 5 /HPF
URN SPEC COLLECT METH UR: ABNORMAL
WBC #/AREA URNS AUTO: 1 /HPF (ref 0–5)

## 2021-01-04 ENCOUNTER — HOSPITAL ENCOUNTER (OUTPATIENT)
Dept: RADIOLOGY | Facility: HOSPITAL | Age: 61
Discharge: HOME | End: 2021-01-04
Attending: FAMILY MEDICINE
Payer: COMMERCIAL

## 2021-01-04 ENCOUNTER — OFFICE VISIT (OUTPATIENT)
Dept: FAMILY MEDICINE | Facility: CLINIC | Age: 61
End: 2021-01-04
Payer: COMMERCIAL

## 2021-01-04 VITALS
SYSTOLIC BLOOD PRESSURE: 112 MMHG | HEART RATE: 80 BPM | BODY MASS INDEX: 31.32 KG/M2 | OXYGEN SATURATION: 96 % | WEIGHT: 194.88 LBS | DIASTOLIC BLOOD PRESSURE: 78 MMHG | TEMPERATURE: 98 F | HEIGHT: 66 IN

## 2021-01-04 DIAGNOSIS — F31.30 BIPOLAR AFFECTIVE DISORDER, CURRENT EPISODE DEPRESSED, CURRENT EPISODE SEVERITY UNSPECIFIED: ICD-10-CM

## 2021-01-04 DIAGNOSIS — G89.29 CHRONIC LEFT-SIDED THORACIC BACK PAIN: ICD-10-CM

## 2021-01-04 DIAGNOSIS — G89.29 CHRONIC LEFT-SIDED THORACIC BACK PAIN: Primary | ICD-10-CM

## 2021-01-04 DIAGNOSIS — M54.6 CHRONIC LEFT-SIDED THORACIC BACK PAIN: ICD-10-CM

## 2021-01-04 DIAGNOSIS — M54.6 CHRONIC LEFT-SIDED THORACIC BACK PAIN: Primary | ICD-10-CM

## 2021-01-04 PROCEDURE — 1125F AMNT PAIN NOTED PAIN PRSNT: CPT | Mod: S$GLB,,, | Performed by: FAMILY MEDICINE

## 2021-01-04 PROCEDURE — 99999 PR PBB SHADOW E&M-EST. PATIENT-LVL III: CPT | Mod: PBBFAC,,, | Performed by: FAMILY MEDICINE

## 2021-01-04 PROCEDURE — 72070 X-RAY EXAM THORAC SPINE 2VWS: CPT | Mod: TC,FY,PO

## 2021-01-04 PROCEDURE — 99999 PR PBB SHADOW E&M-EST. PATIENT-LVL III: ICD-10-PCS | Mod: PBBFAC,,, | Performed by: FAMILY MEDICINE

## 2021-01-04 PROCEDURE — 99213 PR OFFICE/OUTPT VISIT, EST, LEVL III, 20-29 MIN: ICD-10-PCS | Mod: S$GLB,,, | Performed by: FAMILY MEDICINE

## 2021-01-04 PROCEDURE — 99213 OFFICE O/P EST LOW 20 MIN: CPT | Mod: S$GLB,,, | Performed by: FAMILY MEDICINE

## 2021-01-04 PROCEDURE — 72070 X-RAY EXAM THORAC SPINE 2VWS: CPT | Mod: 26,,, | Performed by: RADIOLOGY

## 2021-01-04 PROCEDURE — 72070 XR THORACIC SPINE AP LATERAL: ICD-10-PCS | Mod: 26,,, | Performed by: RADIOLOGY

## 2021-01-04 PROCEDURE — 3008F PR BODY MASS INDEX (BMI) DOCUMENTED: ICD-10-PCS | Mod: CPTII,S$GLB,, | Performed by: FAMILY MEDICINE

## 2021-01-04 PROCEDURE — 3008F BODY MASS INDEX DOCD: CPT | Mod: CPTII,S$GLB,, | Performed by: FAMILY MEDICINE

## 2021-01-04 PROCEDURE — 1125F PR PAIN SEVERITY QUANTIFIED, PAIN PRESENT: ICD-10-PCS | Mod: S$GLB,,, | Performed by: FAMILY MEDICINE

## 2021-01-04 RX ORDER — CYCLOBENZAPRINE HCL 10 MG
10 TABLET ORAL 3 TIMES DAILY PRN
Qty: 30 TABLET | Refills: 0 | Status: SHIPPED | OUTPATIENT
Start: 2021-01-04 | End: 2021-01-14

## 2021-01-04 RX ORDER — PREDNISONE 20 MG/1
TABLET ORAL
Qty: 10 TABLET | Refills: 0 | Status: SHIPPED | OUTPATIENT
Start: 2021-01-04 | End: 2021-01-28

## 2021-01-11 ENCOUNTER — OFFICE VISIT (OUTPATIENT)
Dept: PSYCHIATRY | Facility: CLINIC | Age: 61
End: 2021-01-11
Payer: COMMERCIAL

## 2021-01-11 DIAGNOSIS — F41.9 ANXIETY: ICD-10-CM

## 2021-01-11 DIAGNOSIS — F31.62 BIPOLAR MIXED AFFECTIVE DISORDER, MODERATE: Primary | ICD-10-CM

## 2021-01-11 DIAGNOSIS — Z63.9 RELATIONSHIP PROBLEMS: ICD-10-CM

## 2021-01-11 PROCEDURE — 90834 PR PSYCHOTHERAPY W/PATIENT, 45 MIN: ICD-10-PCS | Mod: S$GLB,,, | Performed by: SOCIAL WORKER

## 2021-01-11 PROCEDURE — 90834 PSYTX W PT 45 MINUTES: CPT | Mod: S$GLB,,, | Performed by: SOCIAL WORKER

## 2021-01-11 SDOH — SOCIAL DETERMINANTS OF HEALTH (SDOH): PROBLEM RELATED TO PRIMARY SUPPORT GROUP, UNSPECIFIED: Z63.9

## 2021-01-14 ENCOUNTER — PATIENT MESSAGE (OUTPATIENT)
Dept: FAMILY MEDICINE | Facility: CLINIC | Age: 61
End: 2021-01-14

## 2021-01-19 ENCOUNTER — TELEPHONE (OUTPATIENT)
Dept: PSYCHIATRY | Facility: CLINIC | Age: 61
End: 2021-01-19

## 2021-01-28 ENCOUNTER — PATIENT MESSAGE (OUTPATIENT)
Dept: FAMILY MEDICINE | Facility: CLINIC | Age: 61
End: 2021-01-28

## 2021-01-28 RX ORDER — QUETIAPINE FUMARATE 25 MG/1
TABLET, FILM COATED ORAL
Status: CANCELLED | OUTPATIENT
Start: 2021-01-28

## 2021-02-02 RX ORDER — QUETIAPINE FUMARATE 25 MG/1
TABLET, FILM COATED ORAL
Qty: 90 TABLET | Refills: 3 | Status: SHIPPED | OUTPATIENT
Start: 2021-02-02 | End: 2021-02-22

## 2021-02-02 RX ORDER — QUETIAPINE FUMARATE 25 MG/1
TABLET, FILM COATED ORAL
Status: CANCELLED | OUTPATIENT
Start: 2021-02-02

## 2021-02-02 NOTE — TELEPHONE ENCOUNTER
----- Message from Noni Weir sent at 11/16/2017  8:06 AM CST -----  pls work pt in today for cold...[phone]  
Apt made  Pt notified   
Pt request to be worked in for a cold this evening   
There are open slots.  
117

## 2021-02-08 ENCOUNTER — HOSPITAL ENCOUNTER (OUTPATIENT)
Dept: RADIOLOGY | Facility: HOSPITAL | Age: 61
Discharge: HOME | End: 2021-02-08
Attending: FAMILY MEDICINE
Payer: COMMERCIAL

## 2021-02-08 ENCOUNTER — OFFICE VISIT (OUTPATIENT)
Dept: PSYCHIATRY | Facility: CLINIC | Age: 61
End: 2021-02-08
Payer: COMMERCIAL

## 2021-02-08 ENCOUNTER — TELEPHONE (OUTPATIENT)
Dept: FAMILY MEDICINE | Facility: CLINIC | Age: 61
End: 2021-02-08

## 2021-02-08 VITALS — BODY MASS INDEX: 31.32 KG/M2 | WEIGHT: 194.88 LBS | HEIGHT: 66 IN

## 2021-02-08 DIAGNOSIS — Z12.31 ENCOUNTER FOR SCREENING MAMMOGRAM FOR MALIGNANT NEOPLASM OF BREAST: ICD-10-CM

## 2021-02-08 DIAGNOSIS — F41.9 ANXIETY: ICD-10-CM

## 2021-02-08 DIAGNOSIS — Z63.9 RELATIONSHIP PROBLEMS: ICD-10-CM

## 2021-02-08 DIAGNOSIS — F31.62 BIPOLAR MIXED AFFECTIVE DISORDER, MODERATE: Primary | ICD-10-CM

## 2021-02-08 PROCEDURE — 77067 MAMMO DIGITAL SCREENING BILAT WITH TOMO: ICD-10-PCS | Mod: 26,,, | Performed by: RADIOLOGY

## 2021-02-08 PROCEDURE — 77067 SCR MAMMO BI INCL CAD: CPT | Mod: 26,,, | Performed by: RADIOLOGY

## 2021-02-08 PROCEDURE — 77063 BREAST TOMOSYNTHESIS BI: CPT | Mod: 26,,, | Performed by: RADIOLOGY

## 2021-02-08 PROCEDURE — 99999 PR PBB SHADOW E&M-EST. PATIENT-LVL I: ICD-10-PCS | Mod: PBBFAC,,, | Performed by: SOCIAL WORKER

## 2021-02-08 PROCEDURE — 90834 PR PSYCHOTHERAPY W/PATIENT, 45 MIN: ICD-10-PCS | Mod: S$GLB,,, | Performed by: SOCIAL WORKER

## 2021-02-08 PROCEDURE — 77067 SCR MAMMO BI INCL CAD: CPT | Mod: TC

## 2021-02-08 PROCEDURE — 90834 PSYTX W PT 45 MINUTES: CPT | Mod: S$GLB,,, | Performed by: SOCIAL WORKER

## 2021-02-08 PROCEDURE — 77063 MAMMO DIGITAL SCREENING BILAT WITH TOMO: ICD-10-PCS | Mod: 26,,, | Performed by: RADIOLOGY

## 2021-02-08 PROCEDURE — 99999 PR PBB SHADOW E&M-EST. PATIENT-LVL I: CPT | Mod: PBBFAC,,, | Performed by: SOCIAL WORKER

## 2021-02-08 SDOH — SOCIAL DETERMINANTS OF HEALTH (SDOH): PROBLEM RELATED TO PRIMARY SUPPORT GROUP, UNSPECIFIED: Z63.9

## 2021-02-22 ENCOUNTER — OFFICE VISIT (OUTPATIENT)
Dept: PSYCHIATRY | Facility: CLINIC | Age: 61
End: 2021-02-22
Payer: COMMERCIAL

## 2021-02-22 ENCOUNTER — CLINICAL SUPPORT (OUTPATIENT)
Dept: FAMILY MEDICINE | Facility: CLINIC | Age: 61
End: 2021-02-22
Payer: COMMERCIAL

## 2021-02-22 DIAGNOSIS — G47.00 INSOMNIA, UNSPECIFIED TYPE: ICD-10-CM

## 2021-02-22 DIAGNOSIS — F41.9 ANXIETY: ICD-10-CM

## 2021-02-22 DIAGNOSIS — F31.30 BIPOLAR AFFECTIVE DISORDER, CURRENT EPISODE DEPRESSED, CURRENT EPISODE SEVERITY UNSPECIFIED: Primary | ICD-10-CM

## 2021-02-22 PROCEDURE — 90471 ZOSTER RECOMBINANT VACCINE: ICD-10-PCS | Mod: S$GLB,,, | Performed by: FAMILY MEDICINE

## 2021-02-22 PROCEDURE — 90471 IMMUNIZATION ADMIN: CPT | Mod: S$GLB,,, | Performed by: FAMILY MEDICINE

## 2021-02-22 PROCEDURE — 99213 PR OFFICE/OUTPT VISIT, EST, LEVL III, 20-29 MIN: ICD-10-PCS | Mod: 95,,, | Performed by: PSYCHIATRY & NEUROLOGY

## 2021-02-22 PROCEDURE — 99999 PR PBB SHADOW E&M-EST. PATIENT-LVL I: ICD-10-PCS | Mod: PBBFAC,,,

## 2021-02-22 PROCEDURE — 99999 PR PBB SHADOW E&M-EST. PATIENT-LVL I: CPT | Mod: PBBFAC,,,

## 2021-02-22 PROCEDURE — 90750 ZOSTER RECOMBINANT VACCINE: ICD-10-PCS | Mod: S$GLB,,, | Performed by: FAMILY MEDICINE

## 2021-02-22 PROCEDURE — 90750 HZV VACC RECOMBINANT IM: CPT | Mod: S$GLB,,, | Performed by: FAMILY MEDICINE

## 2021-02-22 PROCEDURE — 99213 OFFICE O/P EST LOW 20 MIN: CPT | Mod: 95,,, | Performed by: PSYCHIATRY & NEUROLOGY

## 2021-02-22 RX ORDER — BUSPIRONE HYDROCHLORIDE 30 MG/1
30 TABLET ORAL 2 TIMES DAILY
Qty: 60 TABLET | Refills: 3 | Status: SHIPPED | OUTPATIENT
Start: 2021-02-22 | End: 2021-06-21 | Stop reason: SDUPTHER

## 2021-02-22 RX ORDER — LAMOTRIGINE 150 MG/1
150 TABLET ORAL DAILY
Qty: 30 TABLET | Refills: 3 | Status: SHIPPED | OUTPATIENT
Start: 2021-02-22 | End: 2021-06-21 | Stop reason: SDUPTHER

## 2021-02-22 RX ORDER — TRAZODONE HYDROCHLORIDE 100 MG/1
TABLET ORAL
Qty: 120 TABLET | Refills: 3 | Status: SHIPPED | OUTPATIENT
Start: 2021-02-22 | End: 2021-06-21 | Stop reason: SDUPTHER

## 2021-03-01 ENCOUNTER — OFFICE VISIT (OUTPATIENT)
Dept: OPHTHALMOLOGY | Facility: CLINIC | Age: 61
End: 2021-03-01
Payer: COMMERCIAL

## 2021-03-01 DIAGNOSIS — H52.7 REFRACTIVE ERRORS: ICD-10-CM

## 2021-03-01 DIAGNOSIS — H25.13 CATARACT, NUCLEAR SCLEROTIC SENILE, BILATERAL: Primary | ICD-10-CM

## 2021-03-01 DIAGNOSIS — Z98.890 HX OF LASIK: ICD-10-CM

## 2021-03-01 PROCEDURE — 92004 COMPRE OPH EXAM NEW PT 1/>: CPT | Mod: S$GLB,,, | Performed by: OPTOMETRIST

## 2021-03-01 PROCEDURE — 92015 DETERMINE REFRACTIVE STATE: CPT | Mod: S$GLB,,, | Performed by: OPTOMETRIST

## 2021-03-01 PROCEDURE — 92015 PR REFRACTION: ICD-10-PCS | Mod: S$GLB,,, | Performed by: OPTOMETRIST

## 2021-03-01 PROCEDURE — 99999 PR PBB SHADOW E&M-EST. PATIENT-LVL II: CPT | Mod: PBBFAC,,, | Performed by: OPTOMETRIST

## 2021-03-01 PROCEDURE — 92004 PR EYE EXAM, NEW PATIENT,COMPREHESV: ICD-10-PCS | Mod: S$GLB,,, | Performed by: OPTOMETRIST

## 2021-03-01 PROCEDURE — 99999 PR PBB SHADOW E&M-EST. PATIENT-LVL II: ICD-10-PCS | Mod: PBBFAC,,, | Performed by: OPTOMETRIST

## 2021-04-05 ENCOUNTER — OFFICE VISIT (OUTPATIENT)
Dept: PSYCHIATRY | Facility: CLINIC | Age: 61
End: 2021-04-05
Payer: COMMERCIAL

## 2021-04-05 DIAGNOSIS — Z63.9 RELATIONSHIP PROBLEMS: ICD-10-CM

## 2021-04-05 DIAGNOSIS — F31.62 BIPOLAR MIXED AFFECTIVE DISORDER, MODERATE: Primary | ICD-10-CM

## 2021-04-05 DIAGNOSIS — G47.00 INSOMNIA, UNSPECIFIED TYPE: ICD-10-CM

## 2021-04-05 DIAGNOSIS — F41.9 ANXIETY: ICD-10-CM

## 2021-04-05 PROCEDURE — 90834 PSYTX W PT 45 MINUTES: CPT | Mod: S$GLB,,, | Performed by: SOCIAL WORKER

## 2021-04-05 PROCEDURE — 90834 PR PSYCHOTHERAPY W/PATIENT, 45 MIN: ICD-10-PCS | Mod: S$GLB,,, | Performed by: SOCIAL WORKER

## 2021-04-05 SDOH — SOCIAL DETERMINANTS OF HEALTH (SDOH): PROBLEM RELATED TO PRIMARY SUPPORT GROUP, UNSPECIFIED: Z63.9

## 2021-05-03 ENCOUNTER — OFFICE VISIT (OUTPATIENT)
Dept: PSYCHIATRY | Facility: CLINIC | Age: 61
End: 2021-05-03
Payer: COMMERCIAL

## 2021-05-03 DIAGNOSIS — Z63.0 PARTNER RELATIONSHIP PROBLEM: ICD-10-CM

## 2021-05-03 DIAGNOSIS — F41.1 GAD (GENERALIZED ANXIETY DISORDER): ICD-10-CM

## 2021-05-03 DIAGNOSIS — F31.62 BIPOLAR MIXED AFFECTIVE DISORDER, MODERATE: Primary | ICD-10-CM

## 2021-05-03 PROCEDURE — 90834 PR PSYCHOTHERAPY W/PATIENT, 45 MIN: ICD-10-PCS | Mod: S$GLB,,, | Performed by: SOCIAL WORKER

## 2021-05-03 PROCEDURE — 90834 PSYTX W PT 45 MINUTES: CPT | Mod: S$GLB,,, | Performed by: SOCIAL WORKER

## 2021-05-03 SDOH — SOCIAL DETERMINANTS OF HEALTH (SDOH): PROBLEMS IN RELATIONSHIP WITH SPOUSE OR PARTNER: Z63.0

## 2021-05-27 ENCOUNTER — PATIENT MESSAGE (OUTPATIENT)
Dept: FAMILY MEDICINE | Facility: CLINIC | Age: 61
End: 2021-05-27

## 2021-05-31 ENCOUNTER — PATIENT MESSAGE (OUTPATIENT)
Dept: PSYCHIATRY | Facility: CLINIC | Age: 61
End: 2021-05-31

## 2021-06-21 ENCOUNTER — OFFICE VISIT (OUTPATIENT)
Dept: PSYCHIATRY | Facility: CLINIC | Age: 61
End: 2021-06-21
Payer: COMMERCIAL

## 2021-06-21 DIAGNOSIS — F31.30 BIPOLAR I DISORDER, MOST RECENT EPISODE (OR CURRENT) DEPRESSED: Primary | ICD-10-CM

## 2021-06-21 DIAGNOSIS — F41.1 GAD (GENERALIZED ANXIETY DISORDER): ICD-10-CM

## 2021-06-21 DIAGNOSIS — Z63.9 RELATIONSHIP PROBLEMS: ICD-10-CM

## 2021-06-21 PROCEDURE — 90834 PSYTX W PT 45 MINUTES: CPT | Mod: S$GLB,,, | Performed by: SOCIAL WORKER

## 2021-06-21 PROCEDURE — 99999 PR PBB SHADOW E&M-EST. PATIENT-LVL I: ICD-10-PCS | Mod: PBBFAC,,, | Performed by: SOCIAL WORKER

## 2021-06-21 PROCEDURE — 99999 PR PBB SHADOW E&M-EST. PATIENT-LVL I: CPT | Mod: PBBFAC,,, | Performed by: SOCIAL WORKER

## 2021-06-21 PROCEDURE — 99213 OFFICE O/P EST LOW 20 MIN: CPT | Mod: 95,,, | Performed by: PSYCHIATRY & NEUROLOGY

## 2021-06-21 PROCEDURE — 99213 PR OFFICE/OUTPT VISIT, EST, LEVL III, 20-29 MIN: ICD-10-PCS | Mod: 95,,, | Performed by: PSYCHIATRY & NEUROLOGY

## 2021-06-21 PROCEDURE — 90834 PR PSYCHOTHERAPY W/PATIENT, 45 MIN: ICD-10-PCS | Mod: S$GLB,,, | Performed by: SOCIAL WORKER

## 2021-06-21 RX ORDER — TRAZODONE HYDROCHLORIDE 100 MG/1
TABLET ORAL
Qty: 120 TABLET | Refills: 3 | Status: SHIPPED | OUTPATIENT
Start: 2021-06-21 | End: 2021-09-27 | Stop reason: SDUPTHER

## 2021-06-21 RX ORDER — LAMOTRIGINE 150 MG/1
150 TABLET ORAL DAILY
Qty: 30 TABLET | Refills: 3 | Status: SHIPPED | OUTPATIENT
Start: 2021-06-21 | End: 2021-09-27 | Stop reason: SDUPTHER

## 2021-06-21 RX ORDER — BUSPIRONE HYDROCHLORIDE 30 MG/1
30 TABLET ORAL 2 TIMES DAILY
Qty: 60 TABLET | Refills: 3 | Status: SHIPPED | OUTPATIENT
Start: 2021-06-21 | End: 2021-09-27 | Stop reason: SDUPTHER

## 2021-06-21 SDOH — SOCIAL DETERMINANTS OF HEALTH (SDOH): PROBLEM RELATED TO PRIMARY SUPPORT GROUP, UNSPECIFIED: Z63.9

## 2021-07-13 ENCOUNTER — PATIENT MESSAGE (OUTPATIENT)
Dept: PSYCHIATRY | Facility: CLINIC | Age: 61
End: 2021-07-13

## 2021-07-19 ENCOUNTER — OFFICE VISIT (OUTPATIENT)
Dept: PSYCHIATRY | Facility: CLINIC | Age: 61
End: 2021-07-19
Payer: COMMERCIAL

## 2021-07-19 DIAGNOSIS — F31.30 BIPOLAR I DISORDER, MOST RECENT EPISODE (OR CURRENT) DEPRESSED: Primary | ICD-10-CM

## 2021-07-19 DIAGNOSIS — Z63.9 RELATIONSHIP PROBLEMS: ICD-10-CM

## 2021-07-19 DIAGNOSIS — F41.1 GAD (GENERALIZED ANXIETY DISORDER): ICD-10-CM

## 2021-07-19 PROCEDURE — 90834 PR PSYCHOTHERAPY W/PATIENT, 45 MIN: ICD-10-PCS | Mod: S$GLB,,, | Performed by: SOCIAL WORKER

## 2021-07-19 PROCEDURE — 90834 PSYTX W PT 45 MINUTES: CPT | Mod: S$GLB,,, | Performed by: SOCIAL WORKER

## 2021-07-19 SDOH — SOCIAL DETERMINANTS OF HEALTH (SDOH): PROBLEM RELATED TO PRIMARY SUPPORT GROUP, UNSPECIFIED: Z63.9

## 2021-08-23 ENCOUNTER — PATIENT MESSAGE (OUTPATIENT)
Dept: PSYCHIATRY | Facility: CLINIC | Age: 61
End: 2021-08-23

## 2021-08-23 ENCOUNTER — OFFICE VISIT (OUTPATIENT)
Dept: PSYCHIATRY | Facility: CLINIC | Age: 61
End: 2021-08-23
Payer: COMMERCIAL

## 2021-08-23 DIAGNOSIS — Z63.9 RELATIONSHIP PROBLEMS: ICD-10-CM

## 2021-08-23 DIAGNOSIS — F41.1 GAD (GENERALIZED ANXIETY DISORDER): ICD-10-CM

## 2021-08-23 DIAGNOSIS — F31.30 BIPOLAR I DISORDER, MOST RECENT EPISODE (OR CURRENT) DEPRESSED: Primary | ICD-10-CM

## 2021-08-23 PROCEDURE — 90834 PR PSYCHOTHERAPY W/PATIENT, 45 MIN: ICD-10-PCS | Mod: 95,,, | Performed by: SOCIAL WORKER

## 2021-08-23 PROCEDURE — 90834 PSYTX W PT 45 MINUTES: CPT | Mod: 95,,, | Performed by: SOCIAL WORKER

## 2021-08-23 SDOH — SOCIAL DETERMINANTS OF HEALTH (SDOH): PROBLEM RELATED TO PRIMARY SUPPORT GROUP, UNSPECIFIED: Z63.9

## 2021-09-27 ENCOUNTER — OFFICE VISIT (OUTPATIENT)
Dept: PSYCHIATRY | Facility: CLINIC | Age: 61
End: 2021-09-27
Payer: COMMERCIAL

## 2021-09-27 ENCOUNTER — PATIENT OUTREACH (OUTPATIENT)
Dept: ADMINISTRATIVE | Facility: HOSPITAL | Age: 61
End: 2021-09-27

## 2021-09-27 DIAGNOSIS — Z63.9 RELATIONSHIP PROBLEMS: ICD-10-CM

## 2021-09-27 DIAGNOSIS — F41.9 ANXIETY: ICD-10-CM

## 2021-09-27 DIAGNOSIS — F41.1 GAD (GENERALIZED ANXIETY DISORDER): ICD-10-CM

## 2021-09-27 DIAGNOSIS — G47.00 INSOMNIA, UNSPECIFIED TYPE: ICD-10-CM

## 2021-09-27 DIAGNOSIS — F31.30 BIPOLAR DISORDER, MOST RECENT EPISODE DEPRESSED: Primary | ICD-10-CM

## 2021-09-27 DIAGNOSIS — F31.62 BIPOLAR MIXED AFFECTIVE DISORDER, MODERATE: Primary | ICD-10-CM

## 2021-09-27 PROCEDURE — 1159F MED LIST DOCD IN RCRD: CPT | Mod: CPTII,95,, | Performed by: PSYCHIATRY & NEUROLOGY

## 2021-09-27 PROCEDURE — 1159F PR MEDICATION LIST DOCUMENTED IN MEDICAL RECORD: ICD-10-PCS | Mod: CPTII,95,, | Performed by: PSYCHIATRY & NEUROLOGY

## 2021-09-27 PROCEDURE — 90834 PSYTX W PT 45 MINUTES: CPT | Mod: S$GLB,,, | Performed by: SOCIAL WORKER

## 2021-09-27 PROCEDURE — 1160F RVW MEDS BY RX/DR IN RCRD: CPT | Mod: CPTII,95,, | Performed by: PSYCHIATRY & NEUROLOGY

## 2021-09-27 PROCEDURE — 90834 PR PSYCHOTHERAPY W/PATIENT, 45 MIN: ICD-10-PCS | Mod: S$GLB,,, | Performed by: SOCIAL WORKER

## 2021-09-27 PROCEDURE — 99213 OFFICE O/P EST LOW 20 MIN: CPT | Mod: 95,,, | Performed by: PSYCHIATRY & NEUROLOGY

## 2021-09-27 PROCEDURE — 99999 PR PBB SHADOW E&M-EST. PATIENT-LVL I: CPT | Mod: PBBFAC,,, | Performed by: SOCIAL WORKER

## 2021-09-27 PROCEDURE — 1160F PR REVIEW ALL MEDS BY PRESCRIBER/CLIN PHARMACIST DOCUMENTED: ICD-10-PCS | Mod: CPTII,95,, | Performed by: PSYCHIATRY & NEUROLOGY

## 2021-09-27 PROCEDURE — 99213 PR OFFICE/OUTPT VISIT, EST, LEVL III, 20-29 MIN: ICD-10-PCS | Mod: 95,,, | Performed by: PSYCHIATRY & NEUROLOGY

## 2021-09-27 PROCEDURE — 99999 PR PBB SHADOW E&M-EST. PATIENT-LVL I: ICD-10-PCS | Mod: PBBFAC,,, | Performed by: SOCIAL WORKER

## 2021-09-27 RX ORDER — TRAZODONE HYDROCHLORIDE 100 MG/1
TABLET ORAL
Qty: 120 TABLET | Refills: 3 | Status: SHIPPED | OUTPATIENT
Start: 2021-09-27 | End: 2022-01-24

## 2021-09-27 RX ORDER — LAMOTRIGINE 150 MG/1
150 TABLET ORAL DAILY
Qty: 30 TABLET | Refills: 3 | Status: SHIPPED | OUTPATIENT
Start: 2021-09-27 | End: 2022-01-24 | Stop reason: SDUPTHER

## 2021-09-27 RX ORDER — BUSPIRONE HYDROCHLORIDE 30 MG/1
30 TABLET ORAL 2 TIMES DAILY
Qty: 60 TABLET | Refills: 3 | Status: SHIPPED | OUTPATIENT
Start: 2021-09-27 | End: 2022-01-24 | Stop reason: SDUPTHER

## 2021-09-27 SDOH — SOCIAL DETERMINANTS OF HEALTH (SDOH): PROBLEM RELATED TO PRIMARY SUPPORT GROUP, UNSPECIFIED: Z63.9

## 2021-10-18 ENCOUNTER — IMMUNIZATION (OUTPATIENT)
Dept: FAMILY MEDICINE | Facility: CLINIC | Age: 61
End: 2021-10-18
Payer: COMMERCIAL

## 2021-10-18 PROCEDURE — 90471 IMMUNIZATION ADMIN: CPT | Mod: S$GLB,,, | Performed by: FAMILY MEDICINE

## 2021-10-18 PROCEDURE — 90686 IIV4 VACC NO PRSV 0.5 ML IM: CPT | Mod: S$GLB,,, | Performed by: FAMILY MEDICINE

## 2021-10-18 PROCEDURE — 90471 FLU VACCINE (QUAD) GREATER THAN OR EQUAL TO 3YO PRESERVATIVE FREE IM: ICD-10-PCS | Mod: S$GLB,,, | Performed by: FAMILY MEDICINE

## 2021-10-18 PROCEDURE — 90686 FLU VACCINE (QUAD) GREATER THAN OR EQUAL TO 3YO PRESERVATIVE FREE IM: ICD-10-PCS | Mod: S$GLB,,, | Performed by: FAMILY MEDICINE

## 2021-11-01 ENCOUNTER — IMMUNIZATION (OUTPATIENT)
Dept: PRIMARY CARE CLINIC | Facility: CLINIC | Age: 61
End: 2021-11-01
Payer: COMMERCIAL

## 2021-11-01 ENCOUNTER — OFFICE VISIT (OUTPATIENT)
Dept: PSYCHIATRY | Facility: CLINIC | Age: 61
End: 2021-11-01
Payer: COMMERCIAL

## 2021-11-01 DIAGNOSIS — F31.30 BIPOLAR DISORDER, MOST RECENT EPISODE DEPRESSED: Primary | ICD-10-CM

## 2021-11-01 DIAGNOSIS — Z23 NEED FOR VACCINATION: Primary | ICD-10-CM

## 2021-11-01 DIAGNOSIS — F41.1 GAD (GENERALIZED ANXIETY DISORDER): ICD-10-CM

## 2021-11-01 DIAGNOSIS — Z63.9 RELATIONSHIP PROBLEMS: ICD-10-CM

## 2021-11-01 DIAGNOSIS — G47.00 INSOMNIA, UNSPECIFIED TYPE: ICD-10-CM

## 2021-11-01 PROCEDURE — 90834 PR PSYCHOTHERAPY W/PATIENT, 45 MIN: ICD-10-PCS | Mod: S$GLB,,, | Performed by: SOCIAL WORKER

## 2021-11-01 PROCEDURE — 0004A COVID-19, MRNA, LNP-S, PF, 30 MCG/0.3 ML DOSE VACCINE: CPT | Mod: CV19,PBBFAC | Performed by: FAMILY MEDICINE

## 2021-11-01 PROCEDURE — 90834 PSYTX W PT 45 MINUTES: CPT | Mod: S$GLB,,, | Performed by: SOCIAL WORKER

## 2021-11-01 SDOH — SOCIAL DETERMINANTS OF HEALTH (SDOH): PROBLEM RELATED TO PRIMARY SUPPORT GROUP, UNSPECIFIED: Z63.9

## 2021-12-06 ENCOUNTER — OFFICE VISIT (OUTPATIENT)
Dept: PSYCHIATRY | Facility: CLINIC | Age: 61
End: 2021-12-06
Payer: COMMERCIAL

## 2021-12-06 DIAGNOSIS — F41.1 GAD (GENERALIZED ANXIETY DISORDER): ICD-10-CM

## 2021-12-06 DIAGNOSIS — G47.00 INSOMNIA, UNSPECIFIED TYPE: ICD-10-CM

## 2021-12-06 DIAGNOSIS — Z63.9 RELATIONSHIP PROBLEMS: ICD-10-CM

## 2021-12-06 DIAGNOSIS — F31.30 BIPOLAR DISORDER, MOST RECENT EPISODE DEPRESSED: Primary | ICD-10-CM

## 2021-12-06 DIAGNOSIS — F43.21 UNRESOLVED GRIEF: ICD-10-CM

## 2021-12-06 PROCEDURE — 90834 PSYTX W PT 45 MINUTES: CPT | Mod: S$GLB,,, | Performed by: SOCIAL WORKER

## 2021-12-06 PROCEDURE — 90834 PR PSYCHOTHERAPY W/PATIENT, 45 MIN: ICD-10-PCS | Mod: S$GLB,,, | Performed by: SOCIAL WORKER

## 2021-12-06 SDOH — SOCIAL DETERMINANTS OF HEALTH (SDOH): PROBLEM RELATED TO PRIMARY SUPPORT GROUP, UNSPECIFIED: Z63.9

## 2021-12-13 ENCOUNTER — TELEPHONE (OUTPATIENT)
Dept: FAMILY MEDICINE | Facility: CLINIC | Age: 61
End: 2021-12-13
Payer: COMMERCIAL

## 2021-12-13 RX ORDER — VALACYCLOVIR HYDROCHLORIDE 1 G/1
2000 TABLET, FILM COATED ORAL EVERY 12 HOURS
Qty: 4 TABLET | Refills: 0 | Status: SHIPPED | OUTPATIENT
Start: 2021-12-13

## 2022-01-03 ENCOUNTER — OFFICE VISIT (OUTPATIENT)
Dept: PSYCHIATRY | Facility: CLINIC | Age: 62
End: 2022-01-03
Payer: COMMERCIAL

## 2022-01-03 DIAGNOSIS — Z63.9 RELATIONSHIP PROBLEMS: ICD-10-CM

## 2022-01-03 DIAGNOSIS — F41.1 GAD (GENERALIZED ANXIETY DISORDER): ICD-10-CM

## 2022-01-03 DIAGNOSIS — F31.30 BIPOLAR DISORDER, MOST RECENT EPISODE DEPRESSED: Primary | ICD-10-CM

## 2022-01-03 DIAGNOSIS — G47.00 INSOMNIA, UNSPECIFIED TYPE: ICD-10-CM

## 2022-01-03 PROCEDURE — 90834 PSYTX W PT 45 MINUTES: CPT | Mod: S$GLB,,, | Performed by: SOCIAL WORKER

## 2022-01-03 PROCEDURE — 90834 PR PSYCHOTHERAPY W/PATIENT, 45 MIN: ICD-10-PCS | Mod: S$GLB,,, | Performed by: SOCIAL WORKER

## 2022-01-03 SDOH — SOCIAL DETERMINANTS OF HEALTH (SDOH): PROBLEM RELATED TO PRIMARY SUPPORT GROUP, UNSPECIFIED: Z63.9

## 2022-01-24 ENCOUNTER — OFFICE VISIT (OUTPATIENT)
Dept: FAMILY MEDICINE | Facility: CLINIC | Age: 62
End: 2022-01-24
Payer: COMMERCIAL

## 2022-01-24 ENCOUNTER — OFFICE VISIT (OUTPATIENT)
Dept: PSYCHIATRY | Facility: CLINIC | Age: 62
End: 2022-01-24
Payer: COMMERCIAL

## 2022-01-24 VITALS
RESPIRATION RATE: 18 BRPM | OXYGEN SATURATION: 100 % | SYSTOLIC BLOOD PRESSURE: 124 MMHG | DIASTOLIC BLOOD PRESSURE: 70 MMHG | WEIGHT: 196.19 LBS | BODY MASS INDEX: 31.53 KG/M2 | TEMPERATURE: 97 F | HEART RATE: 104 BPM | HEIGHT: 66 IN

## 2022-01-24 DIAGNOSIS — E78.2 MIXED HYPERLIPIDEMIA: ICD-10-CM

## 2022-01-24 DIAGNOSIS — F41.1 GENERALIZED ANXIETY DISORDER: ICD-10-CM

## 2022-01-24 DIAGNOSIS — F31.30 BIPOLAR AFFECTIVE DISORDER, CURRENT EPISODE DEPRESSED, CURRENT EPISODE SEVERITY UNSPECIFIED: ICD-10-CM

## 2022-01-24 DIAGNOSIS — F41.9 ANXIETY: ICD-10-CM

## 2022-01-24 DIAGNOSIS — B37.31 CANDIDAL VULVOVAGINITIS: ICD-10-CM

## 2022-01-24 DIAGNOSIS — F31.30 BIPOLAR DISORDER, MOST RECENT EPISODE DEPRESSED: Primary | ICD-10-CM

## 2022-01-24 DIAGNOSIS — K21.9 GASTROESOPHAGEAL REFLUX DISEASE, UNSPECIFIED WHETHER ESOPHAGITIS PRESENT: ICD-10-CM

## 2022-01-24 DIAGNOSIS — Z12.31 ENCOUNTER FOR SCREENING MAMMOGRAM FOR MALIGNANT NEOPLASM OF BREAST: ICD-10-CM

## 2022-01-24 DIAGNOSIS — G47.00 INSOMNIA, UNSPECIFIED TYPE: ICD-10-CM

## 2022-01-24 DIAGNOSIS — Z12.11 COLON CANCER SCREENING: ICD-10-CM

## 2022-01-24 DIAGNOSIS — Z00.00 PREVENTATIVE HEALTH CARE: Primary | ICD-10-CM

## 2022-01-24 PROCEDURE — 3074F PR MOST RECENT SYSTOLIC BLOOD PRESSURE < 130 MM HG: ICD-10-PCS | Mod: CPTII,S$GLB,, | Performed by: FAMILY MEDICINE

## 2022-01-24 PROCEDURE — 3074F SYST BP LT 130 MM HG: CPT | Mod: CPTII,S$GLB,, | Performed by: FAMILY MEDICINE

## 2022-01-24 PROCEDURE — 1160F PR REVIEW ALL MEDS BY PRESCRIBER/CLIN PHARMACIST DOCUMENTED: ICD-10-PCS | Mod: CPTII,S$GLB,, | Performed by: FAMILY MEDICINE

## 2022-01-24 PROCEDURE — 99214 PR OFFICE/OUTPT VISIT, EST, LEVL IV, 30-39 MIN: ICD-10-PCS | Mod: 95,,, | Performed by: PSYCHIATRY & NEUROLOGY

## 2022-01-24 PROCEDURE — 3078F PR MOST RECENT DIASTOLIC BLOOD PRESSURE < 80 MM HG: ICD-10-PCS | Mod: CPTII,S$GLB,, | Performed by: FAMILY MEDICINE

## 2022-01-24 PROCEDURE — 3008F BODY MASS INDEX DOCD: CPT | Mod: CPTII,S$GLB,, | Performed by: FAMILY MEDICINE

## 2022-01-24 PROCEDURE — 3078F DIAST BP <80 MM HG: CPT | Mod: CPTII,S$GLB,, | Performed by: FAMILY MEDICINE

## 2022-01-24 PROCEDURE — 99999 PR PBB SHADOW E&M-EST. PATIENT-LVL IV: ICD-10-PCS | Mod: PBBFAC,,, | Performed by: FAMILY MEDICINE

## 2022-01-24 PROCEDURE — 99214 OFFICE O/P EST MOD 30 MIN: CPT | Mod: 95,,, | Performed by: PSYCHIATRY & NEUROLOGY

## 2022-01-24 PROCEDURE — 99396 PREV VISIT EST AGE 40-64: CPT | Mod: S$GLB,,, | Performed by: FAMILY MEDICINE

## 2022-01-24 PROCEDURE — 1159F PR MEDICATION LIST DOCUMENTED IN MEDICAL RECORD: ICD-10-PCS | Mod: CPTII,S$GLB,, | Performed by: FAMILY MEDICINE

## 2022-01-24 PROCEDURE — 99396 PR PREVENTIVE VISIT,EST,40-64: ICD-10-PCS | Mod: S$GLB,,, | Performed by: FAMILY MEDICINE

## 2022-01-24 PROCEDURE — 1159F MED LIST DOCD IN RCRD: CPT | Mod: CPTII,S$GLB,, | Performed by: FAMILY MEDICINE

## 2022-01-24 PROCEDURE — 1160F RVW MEDS BY RX/DR IN RCRD: CPT | Mod: CPTII,S$GLB,, | Performed by: FAMILY MEDICINE

## 2022-01-24 PROCEDURE — 99999 PR PBB SHADOW E&M-EST. PATIENT-LVL IV: CPT | Mod: PBBFAC,,, | Performed by: FAMILY MEDICINE

## 2022-01-24 PROCEDURE — 3008F PR BODY MASS INDEX (BMI) DOCUMENTED: ICD-10-PCS | Mod: CPTII,S$GLB,, | Performed by: FAMILY MEDICINE

## 2022-01-24 RX ORDER — CLOTRIMAZOLE AND BETAMETHASONE DIPROPIONATE 10; .64 MG/G; MG/G
CREAM TOPICAL 2 TIMES DAILY
Qty: 1 EACH | Refills: 1 | Status: SHIPPED | OUTPATIENT
Start: 2022-01-24 | End: 2023-09-21

## 2022-01-24 RX ORDER — TEMAZEPAM 15 MG/1
15 CAPSULE ORAL NIGHTLY PRN
Qty: 30 CAPSULE | Refills: 2 | Status: SHIPPED | OUTPATIENT
Start: 2022-01-24 | End: 2022-02-23

## 2022-01-24 RX ORDER — LAMOTRIGINE 150 MG/1
150 TABLET ORAL DAILY
Qty: 30 TABLET | Refills: 3 | Status: SHIPPED | OUTPATIENT
Start: 2022-01-24 | End: 2022-03-29 | Stop reason: SDUPTHER

## 2022-01-24 RX ORDER — FLUCONAZOLE 100 MG/1
100 TABLET ORAL DAILY
Qty: 10 TABLET | Refills: 0 | Status: SHIPPED | OUTPATIENT
Start: 2022-01-24 | End: 2023-02-27

## 2022-01-24 RX ORDER — BUSPIRONE HYDROCHLORIDE 30 MG/1
30 TABLET ORAL 2 TIMES DAILY
Qty: 60 TABLET | Refills: 3 | Status: SHIPPED | OUTPATIENT
Start: 2022-01-24 | End: 2022-03-29 | Stop reason: SDUPTHER

## 2022-01-24 NOTE — PROGRESS NOTES
CHIEF COMPLAINT:  This is a 62-year-old female here for preventive health exam.     SUBJECTIVE: The patient is doing well without complaints except irritated, itchy rash in vulva.  Patient is currently being treated for ear infection with antibiotics.  Patient takes pantoprazole for GERD and Seroquel, Lamictal, and Buspar for bipolar disorder. She was recently prescribed temazepam 15 mg for sleeplessness. She has a history of mixed hyperlipidemia.     Eye exam March 2021. Mammogram February 2021.  Pap smear November 2019 due November 2022. Colonoscopy August 2011, due again in August 2021.  Tdap October 2015.  Flu vaccine October 2021. COVID 19 vaccine February, March, November 2021.     ROS:  GENERAL: Patient denies fever, chills, night sweats. Patient denies weight loss.  Patient denies anorexia, fatigue, weakness or swollen glands.  SKIN: Patient denies rash or hair loss.  HEENT: Patient denies sore throat, ear pain, hearing loss, nasal congestion, or runny nose. Patient denies visual disturbance, eye irritation or discharge.  LUNGS: Patient denies wheeze or hemoptysis.  CARDIOVASCULAR: Patient denies chest pain, shortness of breath, palpitations, syncope or lower extremity edema.  GI: Patient denies abdominal pain, nausea, vomiting, diarrhea, constipation, blood in stool or melena.  GENITOURINARY: Patient denies pelvic pain, vaginal discharge, itch or odor. Patient denies irregular vaginal bleeding. Patient denies dysuria, frequency, hematuria, nocturia, urgency or incontinence.  BREASTS: Patient denies breast pain, mass or nipple discharge.  MUSCULOSKELETAL: Patient denies joint pain, swelling, redness or warmth.  NEUROLOGIC: Patient denies headache, vertigo, paresthesias, weakness in limb, dysarthria, dysphagia or abnormality of gait.  PSYCHIATRIC: Patient denies anxiety, depression, or memory loss.     OBJECTIVE:   GENERAL: Well-developed well-nourished obese white female alert and oriented x3, in no acute  distress. Memory, judgment and cognition without deficit.   SKIN: Clear without rash. Normal color and tone.  Purulent lesion palmar surface right hand just proximal to the 5th MCP joint.  Granulation tissue.  Minimal blood-tinged purulent discharge evacuated.  HEENT: Eyes: Clear conjunctivae. No scleral icterus. Pupils equal reactive to light and accommodation. Ears: Clear canals. Clear TMs. Nose:  Crusty scabbed lesion left nares.  Without congestion. Pharynx: Without injection or exudates.  NECK: Supple, normal range of motion. No masses, lymphadenopathy or enlarged thyroid. No JVD. Carotids 2+ and equal. No bruits.  LUNGS: Clear to auscultation. Normal respiratory effort.  CARDIOVASCULAR: Regular rhythm, normal S1, S2 without murmur, gallop or rub.  BACK: No CVA or spinal tenderness.  BREASTS: No masses or nipple discharge. Tenderness to palpation right greater than left breast.  ABDOMEN: Normal appearance. Active bowel sounds. Soft, nontender without mass or organomegaly. No rebound or guarding.  EXTREMITIES: Without cyanosis, clubbing or edema. Distal pulses 2+ and equal. Normal range of motion in all extremities. No joint effusion, erythema or warmth.  NEUROLOGIC: Cranial nerves II through XII without deficit. Motor strength equal bilaterally. Sensation normal to touch. Deep tendon reflexes 2+ and equal. Gait without abnormality. No tremor. Negative cerebellar signs.  PELVIC: External:  Confluent erythema in intertriginous regions in genital area extending to the perianal region.  No excoriations.  Vaginal: Clear. Cervix: Normal appearance, nontender.  No Pap.  Uterus: Normal size and shape. Adnexa: Non-tender, without masses. Rectovaginal: Confirms, heme-negative stool x2.    ASSESSMENT:  1. Preventative health care    2. Mixed hyperlipidemia    3. Bipolar affective disorder, current episode depressed, current episode severity unspecified    4. Generalized anxiety disorder    5. Gastroesophageal reflux  disease, unspecified whether esophagitis present    6. Candidal vulvovaginitis    7. Encounter for screening mammogram for malignant neoplasm of breast    8. Colon cancer screening      PLAN:  1. Weight reduction. Exercise regularly.  2. Age-appropriate counseling.  3. Fasting lab.  4. Mammogram.  5. Colonoscopy.  6. Lotrisone cream apply twice daily to rash.  7. Fluconazole 100 mg daily for 10 days.  8. Refill medications as needed.  9. Follow-up annually.      This note is generated with speech recognition software and is subject to transcription error and sound alike phrases that may be missed by proofreading.

## 2022-01-24 NOTE — PROGRESS NOTES
"Outpatient Psychiatry Follow-up Visit (MD/NP)    2022    June Stevens, a 62 y.o. female, presenting for follow-up visit. Met with patient.    Reason for Encounter: Chief complaint/reason for encounter: addictive disorder, depression, mood swings, anxiety, behavior and interpersonal    Interval Hx: Patient seen and interviewed for follow-up, about 3 months since last visit. This was a VIDEO VISIT. She was at home. Has had a series of minor infections. Back at work. No new health problems. No new or different medications. No new illnesses. Moods mostly ok. Working between home and office. Adherent to medications. Denies side effects.     Background: Pt is a 60 y/o F who presents for establishment of psychiatric care, previously evaluated by Mr. Junito Costa. From his note: reporting chief complaint of "my partner thought since I am getting medication, I ought probably to have some talk therapy as well." Pt described a history of mood swings & recurrent depression going back to her teenage years, a recurrent struggle with anger & angry outbursts, & poor coping skills, relying too much on binge drinking (less than weekly, but always to a point of heavy intoxication) to blot out anger & anxiety. She described a fragile self-esteem, pattern of defensiveness, heightened irritability, & frequent depressive cycles. She believes she has abandonment issues from her parents having left for [place] abruptly when she was just 4 years old, leaving her & her younger sister with a grandmother. Mom returned alone after 1 year but never fully embodied a parental figure to the pt. Her grandmother, who represented stability,  when the pt was 27. She recalls mood issues going back to [employer] age, age 12 or 13. She said that was when social issues at school started to become more difficult as she & those around her started noticing she wasn't conforming to stereotypical feminine norms & came to be suspected of being " "a lesbian. She described an emotional struggle between her Cheondoism beliefs & growing awareness of her same-sex orientation. She said she tried to date boys. She reported a lot of peer rejection, being known as "a alcides lesbian," & she attempted suicide at age 17, stepping out in front of an oncoming car. By age 19 she had been introduced to the bhandari bar scene, which she experienced as a crucial affirmation that she isn't alone, but which met with her mother's disapproval; it took her mother years to become more accepting.  She described her current living situation as a split between 2 households, 1 with her partner of 25 years, who is older & "very controlling," & the other with the pt's mother. She reports tension in her primary relationship that involves infidelity on her part back in 2009, an affair stemming from a high school reunion. She said her partner now polices her, & she feels resentful but also doesn't want to leave her partner over it. The pt denied any suicidal or homicidal ideation, though endorsing remote history of a suicidal episode as a teenager.  Denied any cognitive deficit, psychosis, mood swings, or recreational drug use. Endorsed past struggle with some alcohol abuse, a period of abstinence with AA participation, & now periodic binges. Endorses struggle with explosive anger. Identified therapeutic goals include reduction of depression & anxiety, improved self-esteem, & coping skills, including anger management, assertiveness communication skills.        Symptoms:   ? Mood: depressed mood, diminished interest and worthlessness/guilt  ? Anxiety: excessive anxiety/worry, restlessness/keyed up and irritability  ? Substance abuse: substance tolerance and take more than intended  ? Cognitive functioning: denied  ? Health behaviors: some alcohol binges     Psychiatric history: psychotropic management by PCP & prior suicide attempt(s); no chemical abuse treatment reported but did attend AA for " "years and had a sponsor; now relapsed with a binge pattern of use. Family history of psychiatric illness: father bipolar; sister with clinical anxiety. Social history: See history above; oldest of 3; younger brother drowned as an infant. Younger sister is her closest friend. Chaotic childhood; parents were very young; left patient & younger sister with grandmother when she was 4; father never returned; mother came back after one year. Grandmother, an aunt, and uncle were parental figures. Pt described growing social tension in school in middle school and high school, related to her budding identity as a masculine lesbian.  Reported running away from school and home several times in teens. Attempted suicide at 17. Discovered bhandari bars at 19  found the start of a sort of community. 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 Current committed relationship of the past 25 years; described partner as controling, domineering, but "she takes care of me." Endorsed tension of pt's past infidelity a decade ago. Previous longest relationship was for 5 years. No biological children of her own.  Never in a relationship with a man. Loves animals; finds them easier to relate to than people. Currently lives between two houses, one with her partner, the other with her mother.       Substance use:   Alcohol: binge pattern of abuse; denied any known health damage from alcohol   Drugs: none   Tobacco: none   Caffeine: average of 2 and a half cups of coffee daily    Patient confirms the above history. Reports that she's been anxious her "entire life"; overriding distress & impairment. Past dx'es include depression, bipolar disorder (some impulsive shopping). Meds through Dr. Bee Landon for many years, but stopped seeing Dr. Zhong's in summer 2019. Found treatment unaffordable. Off medicine for 3-4 months. "had some left but thought it wasn't working, it makes me gain weight, & I'm sluggish". " "Thinks that these things improved off the medication.     "got an ultimatum" to get back onto medication by her partner who patient says has observed that patient is less irritable & agitated on medication (but she doesn't see this herself).     Endorses "yes" to most items on MDQ    Psych Hx: "anger issues" since a kid. "fought a lot", "usually defending other people", "I couldn't verbally say what I felt, so I thought", though rarely disciplinary problems in school. Protected her sister, friends.     Treated with medication by PCP in  or , dx'ed with depression in - by Dr. Zhong. Diagnosis later changed to bipolar disorder, but she doesn't believe this matches her. She's read about borderline personality & thinks that features of this diagnosis better match her understanding of herself. Was mostly off medication after Dr. Landon left private practice.     Went back to Dr. Landon in  when she became available again, has seen her since & has continued on her current regimen. Was seeing a therapist that works with her, Namita Lopez. They recommended a long-term treatment program in [place] at one point.     Reports mood problems have led her to be passed up for promotions, advancement.     Denies periods of mood elevation that have led to dysfunction or     Family Hx: father   Social Hx: as above. father left at age 6. Wasn't in her life after that, but sought him out at 41, but was disappointed by the relationship because he wanted money from her.   Has a younger sister & younger brother. Brother , drowned in the bathtub.   Lives between two households (between mother's and partner's). Mother is critical.   Master's degree. Works for the [employer].   No arrests.     Review Of Systems:     GENERAL:  No weight gain or loss  SKIN:  No rashes or lacerations  HEAD:  No headaches  EYES:  No exophthalmos, jaundice or blindness  EARS:  No dizziness, tinnitus or hearing loss  NOSE:  No changes in " smell  MOUTH & THROAT:  No dyskinetic movements or obvious goiter  CHEST:  No shortness of breath, hyperventilation or cough  CARDIOVASCULAR:  No tachycardia or chest pain  ABDOMEN:  No nausea, vomiting, pain, constipation or diarrhea  URINARY:  No frequency, dysuria or sexual dysfunction  ENDOCRINE:  No polydipsia, polyuria  MUSCULOSKELETAL:  No pain or stiffness of the joints  NEUROLOGIC:  No weakness, sensory changes, seizures, confusion, memory loss, tremor or other abnormal movements    Current Evaluation:     Nutritional Screening: Considering the patient's height and weight, medications, medical history and preferences, should a referral be made to the dietitian? no    Constitutional  Vitals:  Most recent vital signs, dated less than 90 days prior to this appointment, were reviewed.       General:  unremarkable, age appropriate     Musculoskeletal  Muscle Strength/Tone:  no tremor, no tic   Gait & Station:  non-ataxic     Psychiatric  Appearance: casually dressed & groomed;   Behavior: calm,   Cooperation: cooperative with assessment  Speech: normal rate, volume, tone  Thought Process: linear, goal-directed  Thought Content: No suicidal or homicidal ideation; no delusions  Affect: normal range  Mood: euthymic  Perceptions: No auditory or visual hallucinations  Level of Consciousness: alert throughout interview  Insight: fair  Cognition: Oriented to person, place, time, & situation  Memory: no apparent deficits to general clinical interview; not formally assessed  Attention/Concentration: no apparent deficits to general clinical interview; not formally assessed  Fund of Knowledge: average by vocabulary/education    Laboratory Data  No visits with results within 1 Month(s) from this visit.   Latest known visit with results is:   Office Visit on 12/28/2020   Component Date Value Ref Range Status    Specimen UA 12/28/2020 Urine, Clean Catch   Final    Color, UA 12/28/2020 Yellow  Yellow, Straw, Marian Final     Appearance, UA 12/28/2020 Hazy* Clear Final    pH, UA 12/28/2020 5.0  5.0 - 8.0 Final    Specific [place], UA 12/28/2020 1.020  1.005 - 1.030 Final    Protein, UA 12/28/2020 Negative  Negative Final    Glucose, UA 12/28/2020 Negative  Negative Final    Ketones, UA 12/28/2020 Negative  Negative Final    Bilirubin (UA) 12/28/2020 Negative  Negative Final    Occult Blood UA 12/28/2020 Negative  Negative Final    Nitrite, UA 12/28/2020 Negative  Negative Final    Leukocytes, UA 12/28/2020 1+* Negative Final    RBC, UA 12/28/2020 0  0 - 4 /hpf Final    WBC, UA 12/28/2020 1  0 - 5 /hpf Final    Bacteria 12/28/2020 Moderate* None-Occ /hpf Final    Squam Epithel, UA 12/28/2020 5  /hpf Final    Microscopic Comment 12/28/2020 SEE COMMENT   Final     Medications  Outpatient Encounter Medications as of 1/24/2022   Medication Sig Dispense Refill    busPIRone (BUSPAR) 30 MG Tab Take 1 tablet (30 mg total) by mouth 2 (two) times daily. 60 tablet 3    ibuprofen (ADVIL,MOTRIN) 800 MG tablet Take 1 tablet (800 mg total) by mouth 3 (three) times daily as needed. 60 tablet 0    lamoTRIgine (LAMICTAL) 150 MG Tab Take 1 tablet (150 mg total) by mouth once daily. 30 tablet 3    pantoprazole (PROTONIX) 40 MG tablet TAKE 1 TABLET BY MOUTH EVERY MORNING 30 MINUTES BEFORE EATING MEAL 30 tablet 0    traZODone (DESYREL) 100 MG tablet Take 2 to 4 tablets at bedtime 120 tablet 3    valACYclovir (VALTREX) 1000 MG tablet Take 2 tablets (2,000 mg total) by mouth every 12 (twelve) hours. 4 tablet 0     No facility-administered encounter medications on file as of 1/24/2022.     Assessment - Diagnosis - Goals:     Impression: Patient is a 60 y/o F with chronic bipolar disorder, with active major depressive episode despite adherence with medication; has hx of remote substance use disorders. More problems with insomnia.     Dx: bipolar I disorder, mre depressed; anxiety    Treatment Goals:  Specify outcomes written in observable,  behavioral terms: prevent recurrences.    Treatment Plan/Recommendations:   · Lamotrigine, buspirone for anxiety. Temazepam in place of trazodone prn sleep.   · Psychotherapy prn.  · Discussed risks, benefits, and alternatives to treatment plan documented above with patient. I answered all patient questions related to this plan and patient expressed understanding and agreement.     Return to Clinic: 4 months    CLINT Soriano MD  Psychiatry  [facility]  [address] , [place] [address]  [phone]

## 2022-01-24 NOTE — PROGRESS NOTES
"Individual Psychotherapy (PhD/LCSW)    5/3/21    Site:  [facility]         --Via virtual visit with synchronous audio and video.  Patient presented at home, in the state [facility].   Each patient to whom medical services by telemedicine is provided is:  (1) informed of the relationship between the physician and patient and the respective role of any other health care provider with respect to management of the patient; and (2) notified that he or she may decline to receive medical services by telemedicine and may withdraw from such care at any time.      Therapeutic Intervention: Met with patient via virtual visit.  Outpatient - Insight oriented psychotherapy 45 min - CPT code 66839 and Outpatient - Supportive psychotherapy 45 min - CPT Code 98378    Chief complaint/reason for encounter: depression, mood swings, anxiety, grief and loss, and interpersonal     Interval history and content of current session:   Late entry for 12/6/21.  61 year old female patient following up for individual psychotherapy to address bipolar mood swings, anxiety, and interpersonal life stressors.  Patient reported this session she is "doing okay" over all.  But she then indicated she has recurrent thoughts of sadness and grief for her grandmother she lost at the age of 28.  She described thiis grandmother as more of a mother to her than her own mother.  Stated the loss of her grandmother was devastating for her, and endorsed the idea that it may be a major reason that she feels sometimes paralyzed with worry for her own mother's health and safety, saying openly that whenever she loses her mother, she expects to fall apart and does not even want to think about the prospect enough to help her prepare for that eventuality.  Stated her relationship with her mother has been fraught with tension and "head-butting," but her mother represents the patient's closest remaining tie to her beloved late grandmother.  She endorses ongoing difficulty " talking with her mother, whom she finds frequently aggravating, and she endorsed despite that, ongoing wish to move her mother into the home the patient shares with her partner, something her partner flatly refuses to go along with.  Her mother, also, has expressed no enthusiasm for such a plan to move.  Patient denied any si/hi, psychosis, cognitive deficits, or substance abuse. Supportive therapy provided.  Plan is to follow up for psychotherapy 1/3/22.          Treatment plan:  · Target symptoms: depression, anxiety , substance abuse, mood swings, grief, relationship tension  · Why chosen therapy is appropriate versus another modality: relevant to diagnosis, patient responds to this modality  · Outcome monitoring methods: self-report, observation  · Therapeutic intervention type: insight oriented psychotherapy, supportive psychotherapy    Risk parameters:  Patient reports no suicidal ideation  Patient reports no homicidal ideation  Patient reports no self-injurious behavior  Patient reports no violent behavior    Verbal deficits: None    Patient's response to intervention:  The patient's response to intervention is accepting.    Progress toward goals and other mental status changes:  The patient's progress toward goals is fair.    Diagnosis:     ICD-10-CM ICD-9-CM   1. Bipolar disorder, most recent episode depressed  F31.30 296.50   2. ERNESTINE (generalized anxiety disorder)  F41.1 300.02   3. Relationship problems  Z63.9 313.3   4. Insomnia, unspecified type  G47.00 780.52   5. Unresolved grief  F43.21 309.1   History of Alcohol use disorder     Plan:  individual psychotherapy and medication management by physician    Return to clinic: as scheduled    Length of Service (minutes): 45

## 2022-01-31 ENCOUNTER — LAB VISIT (OUTPATIENT)
Dept: LAB | Facility: HOSPITAL | Age: 62
End: 2022-01-31
Attending: FAMILY MEDICINE
Payer: COMMERCIAL

## 2022-01-31 DIAGNOSIS — Z00.00 PREVENTATIVE HEALTH CARE: ICD-10-CM

## 2022-01-31 LAB
ALBUMIN SERPL BCP-MCNC: 3.6 G/DL (ref 3.5–5.2)
ALP SERPL-CCNC: 115 U/L (ref 55–135)
ALT SERPL W/O P-5'-P-CCNC: 14 U/L (ref 10–44)
ANION GAP SERPL CALC-SCNC: 8 MMOL/L (ref 8–16)
AST SERPL-CCNC: 18 U/L (ref 10–40)
BASOPHILS # BLD AUTO: 0.08 K/UL (ref 0–0.2)
BASOPHILS NFR BLD: 1.1 % (ref 0–1.9)
BILIRUB SERPL-MCNC: 0.5 MG/DL (ref 0.1–1)
BUN SERPL-MCNC: 9 MG/DL (ref 8–23)
CALCIUM SERPL-MCNC: 9.8 MG/DL (ref 8.7–10.5)
CHLORIDE SERPL-SCNC: 103 MMOL/L (ref 95–110)
CHOLEST SERPL-MCNC: 202 MG/DL (ref 120–199)
CHOLEST/HDLC SERPL: 4 {RATIO} (ref 2–5)
CO2 SERPL-SCNC: 30 MMOL/L (ref 23–29)
CREAT SERPL-MCNC: 0.9 MG/DL (ref 0.5–1.4)
DIFFERENTIAL METHOD: ABNORMAL
EOSINOPHIL # BLD AUTO: 0.2 K/UL (ref 0–0.5)
EOSINOPHIL NFR BLD: 2.4 % (ref 0–8)
ERYTHROCYTE [DISTWIDTH] IN BLOOD BY AUTOMATED COUNT: 14.3 % (ref 11.5–14.5)
EST. GFR  (AFRICAN AMERICAN): >60 ML/MIN/1.73 M^2
EST. GFR  (NON AFRICAN AMERICAN): >60 ML/MIN/1.73 M^2
GLUCOSE SERPL-MCNC: 91 MG/DL (ref 70–110)
HCT VFR BLD AUTO: 43.1 % (ref 37–48.5)
HDLC SERPL-MCNC: 50 MG/DL (ref 40–75)
HDLC SERPL: 24.8 % (ref 20–50)
HGB BLD-MCNC: 12.9 G/DL (ref 12–16)
IMM GRANULOCYTES # BLD AUTO: 0.01 K/UL (ref 0–0.04)
IMM GRANULOCYTES NFR BLD AUTO: 0.1 % (ref 0–0.5)
LDLC SERPL CALC-MCNC: 128 MG/DL (ref 63–159)
LYMPHOCYTES # BLD AUTO: 2 K/UL (ref 1–4.8)
LYMPHOCYTES NFR BLD: 28 % (ref 18–48)
MCH RBC QN AUTO: 26.5 PG (ref 27–31)
MCHC RBC AUTO-ENTMCNC: 29.9 G/DL (ref 32–36)
MCV RBC AUTO: 89 FL (ref 82–98)
MONOCYTES # BLD AUTO: 0.7 K/UL (ref 0.3–1)
MONOCYTES NFR BLD: 9.6 % (ref 4–15)
NEUTROPHILS # BLD AUTO: 4.1 K/UL (ref 1.8–7.7)
NEUTROPHILS NFR BLD: 58.8 % (ref 38–73)
NONHDLC SERPL-MCNC: 152 MG/DL
NRBC BLD-RTO: 0 /100 WBC
PLATELET # BLD AUTO: 425 K/UL (ref 150–450)
PMV BLD AUTO: 10 FL (ref 9.2–12.9)
POTASSIUM SERPL-SCNC: 4.7 MMOL/L (ref 3.5–5.1)
PROT SERPL-MCNC: 8.1 G/DL (ref 6–8.4)
RBC # BLD AUTO: 4.86 M/UL (ref 4–5.4)
SODIUM SERPL-SCNC: 141 MMOL/L (ref 136–145)
TRIGL SERPL-MCNC: 120 MG/DL (ref 30–150)
TSH SERPL DL<=0.005 MIU/L-ACNC: 2.29 UIU/ML (ref 0.4–4)
WBC # BLD AUTO: 6.97 K/UL (ref 3.9–12.7)

## 2022-01-31 PROCEDURE — 85025 COMPLETE CBC W/AUTO DIFF WBC: CPT | Performed by: FAMILY MEDICINE

## 2022-01-31 PROCEDURE — 36415 COLL VENOUS BLD VENIPUNCTURE: CPT | Mod: PO | Performed by: FAMILY MEDICINE

## 2022-01-31 PROCEDURE — 80053 COMPREHEN METABOLIC PANEL: CPT | Performed by: FAMILY MEDICINE

## 2022-01-31 PROCEDURE — 84443 ASSAY THYROID STIM HORMONE: CPT | Performed by: FAMILY MEDICINE

## 2022-01-31 PROCEDURE — 80061 LIPID PANEL: CPT | Performed by: FAMILY MEDICINE

## 2022-01-31 NOTE — PROGRESS NOTES
"Individual Psychotherapy (PhD/LCSW)    5/3/21    Site:  [facility]         --Via virtual visit with synchronous audio and video.  Patient presented at home, in the [facility].   Each patient to whom medical services by telemedicine is provided is:  (1) informed of the relationship between the physician and patient and the respective role of any other health care provider with respect to management of the patient; and (2) notified that he or she may decline to receive medical services by telemedicine and may withdraw from such care at any time.      Therapeutic Intervention: Met with patient via virtual visit.  Outpatient - Insight oriented psychotherapy 45 min - CPT code 43800 and Outpatient - Supportive psychotherapy 45 min - CPT Code 11936    Chief complaint/reason for encounter: depression, mood swings, anxiety, grief and loss, and interpersonal     Interval history and content of current session:   Late entry for 1/3/22.  61 year old female patient with 62st birthday approaching returned to clinic or follow-up psychotherapy addressing management of bipolar depression, anxiety, grief, and interpersonal relationship stressors.  The patient reported essentially status quo from previous session, as far as mood and stressors.  She continues to ruminate anxiously on questions of her elderly mother's physical lmiitations and of physical safety concerns for her living alone most of the time.  Patient continues to split her own time between her house that she bought that houses her mother and the ouse she shares with her own partner in life.  Patient continues to dream of moving her mother into a "mother-in-law" apartment at the other house, which not only does the patient's partner oppose this, but so does the patient's mother.  Patient reporting some mood swings but mostly mood has been a bit depressed. Patient denied any si/hi, psychosis, cognitive deficits, or substance abuse. Supportive therapy provided.  " Plan is to follow up for psychotherapy 1/3/22.          Treatment plan:  · Target symptoms: depression, anxiety , substance abuse, mood swings, grief, relationship tension  · Why chosen therapy is appropriate versus another modality: relevant to diagnosis, patient responds to this modality  · Outcome monitoring methods: self-report, observation  · Therapeutic intervention type: insight oriented psychotherapy, supportive psychotherapy    Risk parameters:  Patient reports no suicidal ideation  Patient reports no homicidal ideation  Patient reports no self-injurious behavior  Patient reports no violent behavior    Verbal deficits: None    Patient's response to intervention:  The patient's response to intervention is accepting.    Progress toward goals and other mental status changes:  The patient's progress toward goals is fair.    Diagnosis:     ICD-10-CM ICD-9-CM   1. Bipolar disorder, most recent episode depressed  F31.30 296.50   2. ERNESTINE (generalized anxiety disorder)  F41.1 300.02   3. Insomnia, unspecified type  G47.00 780.52   4. Relationship problems  Z63.9 313.3   History of Alcohol use disorder     Plan:  individual psychotherapy and medication management by physician    Return to clinic: as scheduled    Length of Service (minutes): 45

## 2022-02-03 ENCOUNTER — PATIENT MESSAGE (OUTPATIENT)
Dept: FAMILY MEDICINE | Facility: CLINIC | Age: 62
End: 2022-02-03
Payer: COMMERCIAL

## 2022-02-04 ENCOUNTER — PATIENT MESSAGE (OUTPATIENT)
Dept: FAMILY MEDICINE | Facility: CLINIC | Age: 62
End: 2022-02-04
Payer: COMMERCIAL

## 2022-02-14 ENCOUNTER — OFFICE VISIT (OUTPATIENT)
Dept: PSYCHIATRY | Facility: CLINIC | Age: 62
End: 2022-02-14
Payer: COMMERCIAL

## 2022-02-14 DIAGNOSIS — F31.30 BIPOLAR DISORDER, MOST RECENT EPISODE DEPRESSED: Primary | ICD-10-CM

## 2022-02-14 DIAGNOSIS — F43.21 UNRESOLVED GRIEF: ICD-10-CM

## 2022-02-14 DIAGNOSIS — Z63.9 RELATIONSHIP PROBLEMS: ICD-10-CM

## 2022-02-14 DIAGNOSIS — G47.00 INSOMNIA, UNSPECIFIED TYPE: ICD-10-CM

## 2022-02-14 DIAGNOSIS — F41.1 GAD (GENERALIZED ANXIETY DISORDER): ICD-10-CM

## 2022-02-14 PROCEDURE — 99999 PR PBB SHADOW E&M-EST. PATIENT-LVL I: ICD-10-PCS | Mod: PBBFAC,,, | Performed by: SOCIAL WORKER

## 2022-02-14 PROCEDURE — 90834 PR PSYCHOTHERAPY W/PATIENT, 45 MIN: ICD-10-PCS | Mod: S$GLB,,, | Performed by: SOCIAL WORKER

## 2022-02-14 PROCEDURE — 99999 PR PBB SHADOW E&M-EST. PATIENT-LVL I: CPT | Mod: PBBFAC,,, | Performed by: SOCIAL WORKER

## 2022-02-14 PROCEDURE — 90834 PSYTX W PT 45 MINUTES: CPT | Mod: S$GLB,,, | Performed by: SOCIAL WORKER

## 2022-02-14 SDOH — SOCIAL DETERMINANTS OF HEALTH (SDOH): PROBLEM RELATED TO PRIMARY SUPPORT GROUP, UNSPECIFIED: Z63.9

## 2022-02-21 ENCOUNTER — PATIENT MESSAGE (OUTPATIENT)
Dept: FAMILY MEDICINE | Facility: CLINIC | Age: 62
End: 2022-02-21
Payer: COMMERCIAL

## 2022-02-21 ENCOUNTER — HOSPITAL ENCOUNTER (OUTPATIENT)
Dept: RADIOLOGY | Facility: HOSPITAL | Age: 62
Discharge: HOME | End: 2022-02-21
Attending: FAMILY MEDICINE
Payer: COMMERCIAL

## 2022-02-21 VITALS — BODY MASS INDEX: 31.53 KG/M2 | WEIGHT: 196.19 LBS | HEIGHT: 66 IN

## 2022-02-21 DIAGNOSIS — Z12.31 ENCOUNTER FOR SCREENING MAMMOGRAM FOR MALIGNANT NEOPLASM OF BREAST: ICD-10-CM

## 2022-02-21 PROCEDURE — 77063 BREAST TOMOSYNTHESIS BI: CPT | Mod: 26,,, | Performed by: RADIOLOGY

## 2022-02-21 PROCEDURE — 77063 MAMMO DIGITAL SCREENING BILAT WITH TOMO: ICD-10-PCS | Mod: 26,,, | Performed by: RADIOLOGY

## 2022-02-21 PROCEDURE — 77067 MAMMO DIGITAL SCREENING BILAT WITH TOMO: ICD-10-PCS | Mod: 26,,, | Performed by: RADIOLOGY

## 2022-02-21 PROCEDURE — 77063 BREAST TOMOSYNTHESIS BI: CPT | Mod: TC

## 2022-02-21 PROCEDURE — 77067 SCR MAMMO BI INCL CAD: CPT | Mod: 26,,, | Performed by: RADIOLOGY

## 2022-02-21 PROCEDURE — 77067 SCR MAMMO BI INCL CAD: CPT | Mod: TC

## 2022-02-22 ENCOUNTER — PATIENT MESSAGE (OUTPATIENT)
Dept: ENDOSCOPY | Facility: HOSPITAL | Age: 62
End: 2022-02-22
Payer: COMMERCIAL

## 2022-02-22 RX ORDER — POLYETHYLENE GLYCOL 3350, SODIUM SULFATE ANHYDROUS, SODIUM BICARBONATE, SODIUM CHLORIDE, POTASSIUM CHLORIDE 236; 22.74; 6.74; 5.86; 2.97 G/4L; G/4L; G/4L; G/4L; G/4L
4 POWDER, FOR SOLUTION ORAL ONCE
Qty: 4000 ML | Refills: 0 | Status: SHIPPED | OUTPATIENT
Start: 2022-02-22 | End: 2022-02-22

## 2022-02-24 ENCOUNTER — PATIENT MESSAGE (OUTPATIENT)
Dept: ENDOSCOPY | Facility: HOSPITAL | Age: 62
End: 2022-02-24
Payer: COMMERCIAL

## 2022-03-08 NOTE — PROGRESS NOTES
"Individual Psychotherapy (PhD/LCSW)    5/3/21    Site:  [place]             Therapeutic Intervention: Met with patient via virtual visit.  Outpatient - Insight oriented psychotherapy 45 min - CPT code 15049 and Outpatient - Supportive psychotherapy 45 min - CPT Code 25208    Chief complaint/reason for encounter: depression, mood swings, anxiety, grief and loss, and interpersonal     Interval history and content of current session:   Late entry for 2/14/22.  62 year old female patient with following up for psychotherapy to address her bipolar depression, anxiety, grief, and interpersonal relationship stressors.  The patient presented with a fresh new hairstyle.  Trying to cheer herself up.  Stated she is feeling negative and pessimistic a lot lately.  Reported bouts of unexpected tearfulness.  She talked about not handling stressful situations well.  Patient endorsed entrenched insecurities.  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 Reporting mostly depressive moods lately.  Patient denied any si/hi, psychosis, cognitive deficits, or substance abuse. Supportive therapy provided.  Plan is to follow up for psychotherapy 3/28/22.          Treatment plan:  · Target symptoms: depression, anxiety , substance abuse, mood swings, grief, relationship tension  · Why chosen therapy is appropriate versus another modality: relevant to diagnosis, patient responds to this modality  · Outcome monitoring methods: self-report, observation  · Therapeutic intervention type: insight oriented psychotherapy, supportive psychotherapy    Risk parameters:  Patient reports no suicidal ideation  Patient reports no homicidal ideation  Patient reports no self-injurious behavior  Patient reports no violent behavior    Verbal deficits: None    Patient's response to intervention:  The " patient's response to intervention is accepting.    Progress toward goals and other mental status changes:  The patient's progress toward goals is mixed    Diagnosis:     ICD-10-CM ICD-9-CM   1. Bipolar disorder, most recent episode depressed  F31.30 296.50   2. ERNESTINE (generalized anxiety disorder)  F41.1 300.02   3. Insomnia, unspecified type  G47.00 780.52   4. Relationship problems  Z63.9 313.3   5. Unresolved grief  F43.21 309.1   History of Alcohol use disorder     Plan:  individual psychotherapy and medication management by physician    Return to clinic: as scheduled    Length of Service (minutes): 45

## 2022-03-18 RX ORDER — PANTOPRAZOLE SODIUM 40 MG/1
TABLET, DELAYED RELEASE ORAL
Qty: 30 TABLET | Refills: 11 | Status: SHIPPED | OUTPATIENT
Start: 2022-03-18 | End: 2023-06-05

## 2022-03-28 ENCOUNTER — OFFICE VISIT (OUTPATIENT)
Dept: PSYCHIATRY | Facility: CLINIC | Age: 62
End: 2022-03-28
Payer: COMMERCIAL

## 2022-03-28 DIAGNOSIS — G47.00 INSOMNIA, UNSPECIFIED TYPE: ICD-10-CM

## 2022-03-28 DIAGNOSIS — F41.1 GAD (GENERALIZED ANXIETY DISORDER): ICD-10-CM

## 2022-03-28 DIAGNOSIS — Z63.9 RELATIONSHIP PROBLEMS: ICD-10-CM

## 2022-03-28 DIAGNOSIS — F31.30 BIPOLAR DISORDER, MOST RECENT EPISODE DEPRESSED: Primary | ICD-10-CM

## 2022-03-28 PROCEDURE — 99999 PR PBB SHADOW E&M-EST. PATIENT-LVL I: ICD-10-PCS | Mod: PBBFAC,,, | Performed by: SOCIAL WORKER

## 2022-03-28 PROCEDURE — 90834 PSYTX W PT 45 MINUTES: CPT | Mod: S$GLB,,, | Performed by: SOCIAL WORKER

## 2022-03-28 PROCEDURE — 99999 PR PBB SHADOW E&M-EST. PATIENT-LVL I: CPT | Mod: PBBFAC,,, | Performed by: SOCIAL WORKER

## 2022-03-28 PROCEDURE — 90834 PR PSYCHOTHERAPY W/PATIENT, 45 MIN: ICD-10-PCS | Mod: S$GLB,,, | Performed by: SOCIAL WORKER

## 2022-03-28 SDOH — SOCIAL DETERMINANTS OF HEALTH (SDOH): PROBLEM RELATED TO PRIMARY SUPPORT GROUP, UNSPECIFIED: Z63.9

## 2022-03-29 ENCOUNTER — OFFICE VISIT (OUTPATIENT)
Dept: PSYCHIATRY | Facility: CLINIC | Age: 62
End: 2022-03-29
Payer: COMMERCIAL

## 2022-03-29 DIAGNOSIS — F41.9 ANXIETY: ICD-10-CM

## 2022-03-29 DIAGNOSIS — F31.30 BIPOLAR DISORDER, MOST RECENT EPISODE DEPRESSED: Primary | ICD-10-CM

## 2022-03-29 DIAGNOSIS — G47.00 INSOMNIA, UNSPECIFIED TYPE: ICD-10-CM

## 2022-03-29 PROCEDURE — 99214 PR OFFICE/OUTPT VISIT, EST, LEVL IV, 30-39 MIN: ICD-10-PCS | Mod: 95,,, | Performed by: PSYCHIATRY & NEUROLOGY

## 2022-03-29 PROCEDURE — 99214 OFFICE O/P EST MOD 30 MIN: CPT | Mod: 95,,, | Performed by: PSYCHIATRY & NEUROLOGY

## 2022-03-29 RX ORDER — BUSPIRONE HYDROCHLORIDE 30 MG/1
30 TABLET ORAL 2 TIMES DAILY
Qty: 60 TABLET | Refills: 2 | Status: SHIPPED | OUTPATIENT
Start: 2022-03-29 | End: 2022-06-14 | Stop reason: SDUPTHER

## 2022-03-29 RX ORDER — LAMOTRIGINE 150 MG/1
150 TABLET ORAL DAILY
Qty: 30 TABLET | Refills: 2 | Status: SHIPPED | OUTPATIENT
Start: 2022-03-29 | End: 2022-06-14

## 2022-03-29 RX ORDER — TEMAZEPAM 7.5 MG/1
CAPSULE ORAL
Qty: 90 CAPSULE | Refills: 2 | Status: SHIPPED | OUTPATIENT
Start: 2022-03-29 | End: 2022-06-14 | Stop reason: SDUPTHER

## 2022-03-29 NOTE — PROGRESS NOTES
"Outpatient Psychiatry Follow-up Visit (MD/NP)    3/29/2022    June Stevens, a 62 y.o. female, presenting for follow-up visit. Met with patient.    Reason for Encounter: Chief complaint/reason for encounter: addictive disorder, depression, mood swings, anxiety, behavior and interpersonal    Interval Hx: Patient seen and interviewed for follow-up, about 3 months since last visit. This was a VIDEO VISIT. She was at home. Had a restful vacation followed by resumption of daily stressors, primarily with care of her mother, balancing this with work. Still working between home & office. General health without new conditions or complaints. Mental health has been ok. No new meds. Participating in psychotherapy. Adherent to medications. Denies side effects. Some benefit to sleep with temazepam, but still with awakenings, difficulty returning to sleep, not rested in AM.     Background: Pt is a 60 y/o F who presents for establishment of psychiatric care, previously evaluated by Mr. Junito Costa. From his note: reporting chief complaint of "my partner thought since I am getting medication, I ought probably to have some talk therapy as well." Pt described a history of mood swings & recurrent depression going back to her teenage years, a recurrent struggle with anger & angry outbursts, & poor coping skills, relying too much on binge drinking (less than weekly, but always to a point of heavy intoxication) to blot out anger & anxiety. She described a fragile self-esteem, pattern of defensiveness, heightened irritability, & frequent depressive cycles. She believes she has abandonment issues from her parents having left for [place] abruptly when she was just 4 years old, leaving her & her younger sister with a grandmother. Mom returned alone after 1 year but never fully embodied a parental figure to the pt. Her grandmother, who represented stability,  when the pt was 27. She recalls mood issues going back to [employer] " "age, age 12 or 13. She said that was when social issues at school started to become more difficult as she & those around her started noticing she wasn't conforming to stereotypical feminine norms & came to be suspected of being a lesbian. She described an emotional struggle between her Uatsdin beliefs & growing awareness of her same-sex orientation. She said she tried to date boys. She reported a lot of peer rejection, being known as "a alcides lesbian," & she attempted suicide at age 17, stepping out in front of an oncoming car. By age 19 she had been introduced to the bhandari bar scene, which she experienced as a crucial affirmation that she isn't alone, but which met with her mother's disapproval; it took her mother years to become more accepting.  She described her current living situation as a split between 2 households, 1 with her partner of 25 years, who is older & "very controlling," & the other with the pt's mother. She reports tension in her primary relationship that involves infidelity on her part back in 2009, an affair stemming from a high school reunion. She said her partner now polices her, & she feels resentful but also doesn't want to leave her partner over it. The pt denied any suicidal or homicidal ideation, though endorsing remote history of a suicidal episode as a teenager.  Denied any cognitive deficit, psychosis, mood swings, or recreational drug use. Endorsed past struggle with some alcohol abuse, a period of abstinence with AA participation, & now periodic binges. Endorses struggle with explosive anger. Identified therapeutic goals include reduction of depression & anxiety, improved self-esteem, & coping skills, including anger management, assertiveness communication skills.        Symptoms:   ? Mood: depressed mood, diminished interest and worthlessness/guilt  ? Anxiety: excessive anxiety/worry, restlessness/keyed up and irritability  ? Substance abuse: substance tolerance and take more than " "intended  ? Cognitive functioning: denied  ? Health behaviors: some alcohol binges     Psychiatric history: psychotropic management by PCP & prior suicide attempt(s); no chemical abuse treatment reported but did attend AA for years & had a sponsor; now relapsed with a binge pattern of use. Family history of psychiatric illness: father bipolar; sister with clinical anxiety. Social history: See history above; oldest of 3; younger brother drowned as an infant. Younger sister is her closest friend. Chaotic childhood; parents were very young; left pt & younger sister with grandmother when she was 4; father never returned; mother came back after 1 year. Grandmother, an aunt, & uncle were parental figures. Pt described growing social tension in school in middle school & high school, related to her budding identity as a masculine lesbian. Reported running away from school & home several times in teens. Attempted suicide at 17. Discovered bhandari bars at 19 found the start of a sort of community. 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 Current committed relationship of the past 25 years; described partner as controling, domineering, but "she takes care of me." Endorsed tension of pt's past infidelity a decade ago. Previous longest relationship was for 5 years. No biological children of her own. Never in a relationship with a man. Loves animals; finds them easier to relate to than people. Currently lives between 2 houses, one with her partner, the other with her mother.       Substance use:   Alcohol: binge pattern of abuse; denied any known health damage from alcohol   Drugs: none   Tobacco: none   Caffeine: average of 2 and a half cups of coffee daily    Patient confirms the above history. Reports that she's been anxious her "entire life"; overriding distress & impairment. Past dx'es include depression, bipolar disorder (some impulsive shopping). Meds through Dr. Bee Landon for many " "years, but stopped seeing Dr. Zhong's in summer 2019. Found treatment unaffordable. Off medicine for 3-4 months. "had some left but thought it wasn't working, it makes me gain weight, & I'm sluggish". Thinks that these things improved off the medication.     "got an ultimatum" to get back onto medication by her partner who patient says has observed that patient is less irritable & agitated on medication (but she doesn't see this herself).     Endorses "yes" to most items on MDQ    Psych Hx: "anger issues" since a kid. "fought a lot", "usually defending other people", "I couldn't verbally say what I felt, so I thought", though rarely disciplinary problems in school. Protected her sister, friends.     Treated with medication by PCP in  or , dx'ed with depression in - by Dr. Zhong. Diagnosis later changed to bipolar disorder, but she doesn't believe this matches her. She's read about borderline personality & thinks that features of this diagnosis better match her understanding of herself. Was mostly off medication after Dr. Landon left private practice.     Went back to Dr. Landon in  when she became available again, has seen her since & has continued on her current regimen. Was seeing a therapist that works with her, Namita Lopez. They recommended a long-term treatment program in [place] at one point.     Reports mood problems have led her to be passed up for promotions, advancement.     Denies periods of mood elevation that have led to dysfunction or     Family Hx: father   Social Hx: as above. father left at age 6. Wasn't in her life after that, but sought him out at 41, but was disappointed by the relationship because he wanted money from her.   Has a younger sister & younger brother. Brother , drowned in the bathtub.   Lives between two households (between mother's and partner's). Mother is critical.   Master's degree. Works for the state.   No arrests.     Review Of Systems:     GENERAL:  " No weight gain or loss  SKIN:  No rashes or lacerations  HEAD:  No headaches  EYES:  No exophthalmos, jaundice or blindness  EARS:  No dizziness, tinnitus or hearing loss  NOSE:  No changes in smell  MOUTH & THROAT:  No dyskinetic movements or obvious goiter  CHEST:  No shortness of breath, hyperventilation or cough  CARDIOVASCULAR:  No tachycardia or chest pain  ABDOMEN:  No nausea, vomiting, pain, constipation or diarrhea  URINARY:  No frequency, dysuria or sexual dysfunction  ENDOCRINE:  No polydipsia, polyuria  MUSCULOSKELETAL:  No pain or stiffness of the joints  NEUROLOGIC:  No weakness, sensory changes, seizures, confusion, memory loss, tremor or other abnormal movements    Current Evaluation:     Nutritional Screening: Considering the patient's height and weight, medications, medical history and preferences, should a referral be made to the dietitian? no    Constitutional  Vitals:  Most recent vital signs, dated less than 90 days prior to this appointment, were reviewed.       General:  unremarkable, age appropriate     Musculoskeletal  Muscle Strength/Tone:  no tremor, no tic   Gait & Station:  non-ataxic     Psychiatric  Appearance: casually dressed & groomed;   Behavior: calm,   Cooperation: cooperative with assessment  Speech: normal rate, volume, tone  Thought Process: linear, goal-directed  Thought Content: No suicidal or homicidal ideation; no delusions  Affect: normal range  Mood: euthymic  Perceptions: No auditory or visual hallucinations  Level of Consciousness: alert throughout interview  Insight: fair  Cognition: Oriented to person, place, time, & situation  Memory: no apparent deficits to general clinical interview; not formally assessed  Attention/Concentration: no apparent deficits to general clinical interview; not formally assessed  Fund of Knowledge: average by vocabulary/education    Laboratory Data  No visits with results within 1 Month(s) from this visit.   Latest known visit with  results is:   Lab Visit on 01/31/2022   Component Date Value Ref Range Status    Sodium 01/31/2022 141  136 - 145 mmol/L Final    Potassium 01/31/2022 4.7  3.5 - 5.1 mmol/L Final    Chloride 01/31/2022 103  95 - 110 mmol/L Final    CO2 01/31/2022 30 (A) 23 - 29 mmol/L Final    Glucose 01/31/2022 91  70 - 110 mg/dL Final    BUN 01/31/2022 9  8 - 23 mg/dL Final    Creatinine 01/31/2022 0.9  0.5 - 1.4 mg/dL Final    Calcium 01/31/2022 9.8  8.7 - 10.5 mg/dL Final    Total Protein 01/31/2022 8.1  6.0 - 8.4 g/dL Final    Albumin 01/31/2022 3.6  3.5 - 5.2 g/dL Final    Total Bilirubin 01/31/2022 0.5  0.1 - 1.0 mg/dL Final    Alkaline Phosphatase 01/31/2022 115  55 - 135 U/L Final    AST 01/31/2022 18  10 - 40 U/L Final    ALT 01/31/2022 14  10 - 44 U/L Final    Anion Gap 01/31/2022 8  8 - 16 mmol/L Final    eGFR if African American 01/31/2022 >60.0  >60 mL/min/1.73 m^2 Final    eGFR if non African American 01/31/2022 >60.0  >60 mL/min/1.73 m^2 Final    WBC 01/31/2022 6.97  3.90 - 12.70 K/uL Final    RBC 01/31/2022 4.86  4.00 - 5.40 M/uL Final    Hemoglobin 01/31/2022 12.9  12.0 - 16.0 g/dL Final    Hematocrit 01/31/2022 43.1  37.0 - 48.5 % Final    MCV 01/31/2022 89  82 - 98 fL Final    MCH 01/31/2022 26.5 (A) 27.0 - 31.0 pg Final    MCHC 01/31/2022 29.9 (A) 32.0 - 36.0 g/dL Final    RDW 01/31/2022 14.3  11.5 - 14.5 % Final    Platelets 01/31/2022 425  150 - 450 K/uL Final    MPV 01/31/2022 10.0  9.2 - 12.9 fL Final    Immature Granulocytes 01/31/2022 0.1  0.0 - 0.5 % Final    Gran # (ANC) 01/31/2022 4.1  1.8 - 7.7 K/uL Final    Immature Grans (Abs) 01/31/2022 0.01  0.00 - 0.04 K/uL Final    Lymph # 01/31/2022 2.0  1.0 - 4.8 K/uL Final    Mono # 01/31/2022 0.7  0.3 - 1.0 K/uL Final    Eos # 01/31/2022 0.2  0.0 - 0.5 K/uL Final    Baso # 01/31/2022 0.08  0.00 - 0.20 K/uL Final    nRBC 01/31/2022 0  0 /100 WBC Final    Gran % 01/31/2022 58.8  38.0 - 73.0 % Final    Lymph % 01/31/2022 28.0   18.0 - 48.0 % Final    Mono % 01/31/2022 9.6  4.0 - 15.0 % Final    Eosinophil % 01/31/2022 2.4  0.0 - 8.0 % Final    Basophil % 01/31/2022 1.1  0.0 - 1.9 % Final    Differential Method 01/31/2022 Automated   Final    Cholesterol 01/31/2022 202 (A) 120 - 199 mg/dL Final    Triglycerides 01/31/2022 120  30 - 150 mg/dL Final    HDL 01/31/2022 50  40 - 75 mg/dL Final    LDL Cholesterol 01/31/2022 128.0  63.0 - 159.0 mg/dL Final    HDL/Cholesterol Ratio 01/31/2022 24.8  20.0 - 50.0 % Final    Total Cholesterol/HDL Ratio 01/31/2022 4.0  2.0 - 5.0 Final    Non-HDL Cholesterol 01/31/2022 152  mg/dL Final    TSH 01/31/2022 2.290  0.400 - 4.000 uIU/mL Final     Medications  Outpatient Encounter Medications as of 3/29/2022   Medication Sig Dispense Refill    busPIRone (BUSPAR) 30 MG Tab Take 1 tablet (30 mg total) by mouth 2 (two) times daily. 60 tablet 3    clotrimazole-betamethasone 1-0.05% (LOTRISONE) cream Apply topically 2 (two) times daily. 1 each 1    fluconazole (DIFLUCAN) 100 MG tablet Take 1 tablet (100 mg total) by mouth once daily. 10 tablet 0    ibuprofen (ADVIL,MOTRIN) 800 MG tablet Take 1 tablet (800 mg total) by mouth 3 (three) times daily as needed. 60 tablet 0    lamoTRIgine (LAMICTAL) 150 MG Tab Take 1 tablet (150 mg total) by mouth once daily. 30 tablet 3    pantoprazole (PROTONIX) 40 MG tablet TAKE 1 TABLET BY MOUTH EVERY MORNING 30 MINUTES BEFORE EATING MEAL 30 tablet 11    valACYclovir (VALTREX) 1000 MG tablet Take 2 tablets (2,000 mg total) by mouth every 12 (twelve) hours. 4 tablet 0     No facility-administered encounter medications on file as of 3/29/2022.     Assessment - Diagnosis - Goals:     Impression: Patient is a 60 y/o F with chronic bipolar disorder, with active major depressive episode despite adherence with medication; has hx of remote substance use disorders. Some progress on insomnia, though not yet adequately managed.     Dx: bipolar I disorder, mre depressed;  anxiety    Treatment Goals:  Specify outcomes written in observable, behavioral terms: prevent recurrences.    Treatment Plan/Recommendations:   · Lamotrigine, buspirone for anxiety. Temazepam to 22.5 mg qhs prn sleep.  · Psychotherapy prn.  · Discussed risks, benefits, and alternatives to treatment plan documented above with patient. I answered all patient questions related to this plan and patient expressed understanding and agreement.     Return to Clinic: 4 months    CLINT Soriano MD  Psychiatry  [facility]  [address] , [place] [address] [phone]

## 2022-04-04 ENCOUNTER — HOSPITAL ENCOUNTER (OUTPATIENT)
Facility: HOSPITAL | Age: 62
Discharge: HOME | End: 2022-04-04
Attending: INTERNAL MEDICINE | Admitting: INTERNAL MEDICINE
Payer: COMMERCIAL

## 2022-04-04 ENCOUNTER — ANESTHESIA EVENT (OUTPATIENT)
Dept: ENDOSCOPY | Facility: HOSPITAL | Age: 62
End: 2022-04-04
Payer: COMMERCIAL

## 2022-04-04 ENCOUNTER — ANESTHESIA (OUTPATIENT)
Dept: ENDOSCOPY | Facility: HOSPITAL | Age: 62
End: 2022-04-04
Payer: COMMERCIAL

## 2022-04-04 DIAGNOSIS — Z12.11 SCREENING FOR MALIGNANT NEOPLASM OF COLON: Primary | ICD-10-CM

## 2022-04-04 PROCEDURE — 37000008 HC ANESTHESIA 1ST 15 MINUTES: Performed by: INTERNAL MEDICINE

## 2022-04-04 PROCEDURE — 63600175 PHARM REV CODE 636 W HCPCS: Performed by: STUDENT IN AN ORGANIZED HEALTH CARE EDUCATION/TRAINING PROGRAM

## 2022-04-04 PROCEDURE — 25000003 PHARM REV CODE 250: Performed by: STUDENT IN AN ORGANIZED HEALTH CARE EDUCATION/TRAINING PROGRAM

## 2022-04-04 PROCEDURE — G0121 COLON CA SCRN NOT HI RSK IND: HCPCS | Performed by: INTERNAL MEDICINE

## 2022-04-04 PROCEDURE — G0121 COLON CA SCRN NOT HI RSK IND: HCPCS | Mod: ,,, | Performed by: INTERNAL MEDICINE

## 2022-04-04 PROCEDURE — G0121 COLON CA SCRN NOT HI RSK IND: ICD-10-PCS | Mod: ,,, | Performed by: INTERNAL MEDICINE

## 2022-04-04 PROCEDURE — 37000009 HC ANESTHESIA EA ADD 15 MINS: Performed by: INTERNAL MEDICINE

## 2022-04-04 RX ORDER — PROPOFOL 10 MG/ML
VIAL (ML) INTRAVENOUS
Status: DISCONTINUED | OUTPATIENT
Start: 2022-04-04 | End: 2022-04-04

## 2022-04-04 RX ORDER — SODIUM CHLORIDE, SODIUM LACTATE, POTASSIUM CHLORIDE, CALCIUM CHLORIDE 600; 310; 30; 20 MG/100ML; MG/100ML; MG/100ML; MG/100ML
INJECTION, SOLUTION INTRAVENOUS CONTINUOUS PRN
Status: DISCONTINUED | OUTPATIENT
Start: 2022-04-04 | End: 2022-04-04

## 2022-04-04 RX ORDER — LIDOCAINE HYDROCHLORIDE 10 MG/ML
INJECTION, SOLUTION EPIDURAL; INFILTRATION; INTRACAUDAL; PERINEURAL
Status: DISCONTINUED | OUTPATIENT
Start: 2022-04-04 | End: 2022-04-04

## 2022-04-04 RX ADMIN — LIDOCAINE HYDROCHLORIDE 100 MG: 10 INJECTION, SOLUTION EPIDURAL; INFILTRATION; INTRACAUDAL; PERINEURAL at 07:04

## 2022-04-04 RX ADMIN — PROPOFOL 100 MG: 10 INJECTION, EMULSION INTRAVENOUS at 07:04

## 2022-04-04 RX ADMIN — PROPOFOL 50 MG: 10 INJECTION, EMULSION INTRAVENOUS at 07:04

## 2022-04-04 RX ADMIN — SODIUM CHLORIDE, SODIUM LACTATE, POTASSIUM CHLORIDE, AND CALCIUM CHLORIDE: 600; 310; 30; 20 INJECTION, SOLUTION INTRAVENOUS at 07:04

## 2022-04-04 NOTE — PROVATION PATIENT INSTRUCTIONS
Discharge Summary/Instructions after an Endoscopic Procedure  Patient Name: June Stevens  Patient MRN: [id number]  Patient YOB: 1960 Monday, April 4, 2022 Ling Mustafa MD  Dear patient,  As a result of recent federal legislation (The Federal Cures Act), you may   receive lab or pathology results from your procedure in your [facility]   account before your physician is able to contact you. Your physician or   their representative will relay the results to you with their   recommendations at their soonest availability.  Thank you,  RESTRICTIONS:  During your procedure today, you received medications for sedation.  These   medications may affect your judgment, balance and coordination.  Therefore,   for 24 hours, you have the following restrictions:   - DO NOT drive a car, operate machinery, make legal/financial decisions,   sign important papers or drink alcohol.    ACTIVITY:  Today: no heavy lifting, straining or running due to procedural   sedation/anesthesia.  The following day: return to full activity including work.  DIET:  Eat and drink normally unless instructed otherwise.     TREATMENT FOR COMMON SIDE EFFECTS:  - Mild abdominal pain, nausea, belching, bloating or excessive gas:  rest,   eat lightly and use a heating pad.  - Sore Throat: treat with throat lozenges and/or gargle with warm salt   water.  - Because air was used during the procedure, expelling large amounts of air   from your rectum or belching is normal.  - If a bowel prep was taken, you may not have a bowel movement for 1-3 days.    This is normal.  SYMPTOMS TO WATCH FOR AND REPORT TO YOUR PHYSICIAN:  1. Abdominal pain or bloating, other than gas cramps.  2. Chest pain.  3. Back pain.  4. Signs of infection such as: chills or fever occurring within 24 hours   after the procedure.  5. Rectal bleeding, which would show as bright red, maroon, or black stools.   (A tablespoon of blood from the rectum is not serious, especially if    hemorrhoids are present.)  6. Vomiting.  7. Weakness or dizziness.  GO DIRECTLY TO THE NEAREST EMERGENCY ROOM IF YOU HAVE ANY OF THE FOLLOWING:      Difficulty breathing              Chills and/or fever over 101 F   Persistent vomiting and/or vomiting blood   Severe abdominal pain   Severe chest pain   Black, tarry stools   Bleeding- more than one tablespoon   Any other symptom or condition that you feel may need urgent attention  Your doctor recommends these additional instructions:  If any biopsies were taken, your doctors clinic will contact you in 1 to 2   weeks with any results.  - Discharge patient to home (with escort).   - Resume previous diet.   - Continue present medications.   - Repeat colonoscopy in 10 years for screening purposes.   - Return to primary care physician.  For questions, problems or results please call your physician Ling Mustafa MD at Work:  [phone]  If you have any questions about the above instructions, call the GI   department at [phone] or call the endoscopy unit at [phone]   from 7am until 3 pm.  [facility], EMERGENCY ROOM PHONE NUMBER:   [phone]  IF A COMPLICATION OR EMERGENCY SITUATION ARISES AND YOU ARE UNABLE TO REACH   YOUR PHYSICIAN - GO DIRECTLY TO THE EMERGENCY ROOM.  I have read or have had read to me these discharge instructions for my   procedure and have received a written copy.  I understand these   instructions and will follow-up with my physician if I have any questions.     __________________________________       _____________________________________  Nurse Signature                                          Patient/Designated   Responsible Party Signature  MD Ling Simeon MD  4/4/2022 7:40:14 AM  This report has been verified and signed electronically.  Dear patient,  As a result of recent federal legislation (The Federal Cures Act), you may   receive lab or pathology results from your  procedure in your [facility]   account before your physician is able to contact you. Your physician or   their representative will relay the results to you with their   recommendations at their soonest availability.  Thank you,  PROVATION

## 2022-04-04 NOTE — H&P
PRE PROCEDURE H&P    Patient Name: June Stevens  MRN: [id number]  : 1960  Date of Procedure:  2022  Referring Physician: June Kelley MD  Primary Physician: June Kelley MD  Procedure Physician: Ling Mustafa MD       Planned Procedure: Colonoscopy  Diagnosis: screening for colon cancer     Chief Complaint: Same as above    HPI: Patient is an 62 y.o. female is here for the above. Last colonoscopy 10 years ago. No FHx of CRC, no blood thinners.     Last colonoscopy:   Family history: none  Anticoagulation: none    Past Medical History:   Past Medical History:   Diagnosis Date    Anxiety     Bipolar disorder     Generalized anxiety disorder     GERD (gastroesophageal reflux disease)     Hyperlipidemia     Obesity     Urinary incontinence         Past Surgical History:  Past Surgical History:   Procedure Laterality Date    CHOLECYSTECTOMY      INCONTINENCE SURGERY      LAPAROSCOPIC SLEEVE GASTRECTOMY  2008    [facility]          Home Medications:  Prior to Admission medications    Medication Sig Start Date End Date Taking? Authorizing Provider   busPIRone (BUSPAR) 30 MG Tab Take 1 tablet (30 mg total) by mouth 2 (two) times daily. 3/29/22 3/29/23 Yes Nabor Tanner MD   fluconazole (DIFLUCAN) 100 MG tablet Take 1 tablet (100 mg total) by mouth once daily. 22  Yes June Kelley MD   lamoTRIgine (LAMICTAL) 150 MG Tab Take 1 tablet (150 mg total) by mouth once daily. 3/29/22  Yes Nabor Tanner MD   pantoprazole (PROTONIX) 40 MG tablet TAKE 1 TABLET BY MOUTH EVERY MORNING 30 MINUTES BEFORE EATING MEAL 3/18/22  Yes June Kelley MD   temazepam (RESTORIL) 7.5 MG Cap Take 2 to 3 at bedtime as needed for sleep. 3/29/22  Yes Nabor Tanner MD   clotrimazole-betamethasone 1-0.05% (LOTRISONE) cream Apply topically 2 (two) times daily. 22   June Kelley MD   ibuprofen (ADVIL,MOTRIN) 800 MG tablet Take 1 tablet (800 mg total) by  "mouth 3 (three) times daily as needed. 12/28/20   Anson Lawson NP   valACYclovir (VALTREX) 1000 MG tablet Take 2 tablets (2,000 mg total) by mouth every 12 (twelve) hours. 12/13/21   June Kelley MD        Allergies:  Review of patient's allergies indicates:   Allergen Reactions    Demerol  [meperidine]      Other reaction(s): Vomiting        Social History:   Social History     Socioeconomic History    Marital status: Single   Occupational History     Employer: [employer]   Tobacco Use    Smoking status: Never Smoker    Smokeless tobacco: Never Used   Substance and Sexual Activity    Alcohol use: No     Comment: Past history of alcohol abuse    Drug use: No    Sexual activity: Yes     Partners: Female   Social History Narrative    The patient has her own residence, but spends most of her time at her partner's house. She has no children. She works as a .        Family History:  Family History   Problem Relation Age of Onset    Hypertension Mother     Macular degeneration Mother     Retinal detachment Mother     COPD Mother     Diabetes Father     Hypertension Maternal Grandmother     Diabetes Maternal Grandmother     Heart disease Maternal Grandmother     Diabetes Mellitus Maternal Aunt     No Known Problems Sister     Other Brother         Drowning       ROS: No acute cardiac events, no acute respiratory complaints.     Physical Exam (all patients):    BP (!) 145/70 (BP Location: Left arm, Patient Position: Lying)   Pulse 63   Temp 97.9 °F (36.6 °C)   Resp 18   Ht 5' 6" (1.676 m)   Wt 90.7 kg (200 lb)   LMP 07/29/2010 (Approximate)   SpO2 97%   Breastfeeding No   BMI 32.28 kg/m²   Lungs: Clear to auscultation bilaterally, respirations unlabored  Heart: Regular rate and rhythm, S1 and S2 normal, no obvious murmurs  Abdomen:         Soft, non-tender, bowel sounds normal, no masses, no organomegaly    Lab Results   Component Value Date    WBC 6.97 01/31/2022 "    MCV 89 01/31/2022    RDW 14.3 01/31/2022     01/31/2022    GLU 91 01/31/2022    HGBA1C 5.6 01/04/2006    BUN 9 01/31/2022     01/31/2022    K 4.7 01/31/2022     01/31/2022        SEDATION PLAN: per anesthesia      History reviewed, vital signs satisfactory, cardiopulmonary status satisfactory, sedation options, risks and plans have been discussed with the patient  All their questions were answered and the patient agrees to the sedation procedures as planned and the patient is deemed an appropriate candidate for the sedation as planned.    The risks, benefits and alternatives of the procedure were discussed with the patient in detail. This discussion was had in the presence of endoscopy staff. The risks include, risks of adverse reaction to sedation requiring the use of reversal agents, bleeding requiring blood transfusion, perforation requiring surgical intervention and technical failure. Other risks include aspiration leading to respiratory distress and respiratory failure resulting in endotracheal intubation and mechanical ventilation including death. If anesthesia is being utilized for this procedure, it is up to the anesthesiologist to determine airway safety including elective endotracheal intubation. Questions were answered, they agree to proceed. There was no language barriers.      Procedure explained to patient, informed consent obtained and placed in chart.    Ling Mustafa  4/4/2022  7:18 AM

## 2022-04-04 NOTE — ANESTHESIA POSTPROCEDURE EVALUATION
Anesthesia Post Evaluation    Patient: June Stevens    Procedure(s) Performed: Procedure(s) (LRB):  COLONOSCOPY (N/A)    Final Anesthesia Type: MAC      Patient location during evaluation: GI PACU  Patient participation: Yes- Able to Participate  Level of consciousness: awake and alert and oriented  Post-procedure vital signs: reviewed and stable  Pain management: adequate  Airway patency: patent  DEVEN mitigation strategies: Multimodal analgesia  PONV status at discharge: No PONV  Anesthetic complications: no      Cardiovascular status: blood pressure returned to baseline  Respiratory status: unassisted  Hydration status: euvolemic  Follow-up not needed.                No case tracking events are documented in the log.      Pain/Allison Score: No data recorded

## 2022-04-04 NOTE — TRANSFER OF CARE
"Anesthesia Transfer of Care Note    Patient: June Stevens    Procedure(s) Performed: Procedure(s) (LRB):  COLONOSCOPY (N/A)    Patient location: PACU    Anesthesia Type: MAC    Transport from OR: Transported from OR on room air with adequate spontaneous ventilation    Post pain: adequate analgesia    Post assessment: no apparent anesthetic complications    Post vital signs: stable    Level of consciousness: responds to stimulation and awake    Nausea/Vomiting: no nausea/vomiting    Complications: none    Transfer of care protocol was followed      Last vitals:   Visit Vitals  BP (!) 145/70 (BP Location: Left arm, Patient Position: Lying)   Pulse 63   Temp 36.6 °C (97.9 °F)   Resp 18   Ht 5' 6" (1.676 m)   Wt 90.7 kg (200 lb)   LMP 07/29/2010 (Approximate)   SpO2 97%   Breastfeeding No   BMI 32.28 kg/m²     "

## 2022-04-04 NOTE — PLAN OF CARE
Dr tejada came to bedside to discuss findings. Vital signs stable, no patient c/o nausea/vomitting, no abdominal pain, no gi bleeding. Patient to be discharged from unit.

## 2022-04-04 NOTE — ANESTHESIA PREPROCEDURE EVALUATION
04/04/2022  June Stevens is a 62 y.o., female.      Pre-op Assessment    I have reviewed the Patient Summary Reports.     I have reviewed the Nursing Notes. I have reviewed the NPO Status.   I have reviewed the Medications.     Review of Systems  Hepatic/GI:   GERD    Psych:   Psychiatric History          Physical Exam  General: Well nourished, Cooperative, Alert and Oriented    Airway:  Mallampati: II   Mouth Opening: Normal  TM Distance: Normal  Tongue: Normal  Neck ROM: Normal ROM    Dental:  Intact        Anesthesia Plan  Type of Anesthesia, risks & benefits discussed:    Anesthesia Type: MAC  Intra-op Monitoring Plan: Standard ASA Monitors  Post Op Pain Control Plan: multimodal analgesia  Induction:  IV  Informed Consent: Informed consent signed with the Patient and all parties understand the risks and agree with anesthesia plan.  All questions answered. Patient consented to blood products? Yes  ASA Score: 2  Day of Surgery Review of History & Physical: H&P Update referred to the surgeon/provider.    Ready For Surgery From Anesthesia Perspective.     .

## 2022-04-05 VITALS
BODY MASS INDEX: 32.14 KG/M2 | HEART RATE: 66 BPM | RESPIRATION RATE: 20 BRPM | SYSTOLIC BLOOD PRESSURE: 136 MMHG | TEMPERATURE: 98 F | HEIGHT: 66 IN | WEIGHT: 200 LBS | OXYGEN SATURATION: 95 % | DIASTOLIC BLOOD PRESSURE: 89 MMHG

## 2022-04-25 ENCOUNTER — IMMUNIZATION (OUTPATIENT)
Dept: PRIMARY CARE CLINIC | Facility: CLINIC | Age: 62
End: 2022-04-25
Payer: COMMERCIAL

## 2022-04-25 DIAGNOSIS — Z23 NEED FOR VACCINATION: Primary | ICD-10-CM

## 2022-04-25 PROCEDURE — 91305 COVID-19, MRNA, LNP-S, PF, 30 MCG/0.3 ML DOSE VACCINE (PFIZER): CPT | Mod: PBBFAC | Performed by: FAMILY MEDICINE

## 2022-05-16 NOTE — PROGRESS NOTES
Individual Psychotherapy (PhD/LCSW)    5/3/21    Site:  [facility]             Therapeutic Intervention: Met with patient via virtual visit.  Outpatient - Insight oriented psychotherapy 45 min - CPT code 76899 and Outpatient - Supportive psychotherapy 45 min - CPT Code 41878    Chief complaint/reason for encounter: depression, mood swings, anxiety, grief and loss, and interpersonal     Interval history and content of current session:   Late entry for 2/28/22. Previously seen 2/14/22.   62 year old female patient returned for psychotherapy to address bipolar depression, anxiety, grief, and interpersonal problems.  Patient casually dressed, reporting on work as stable, and yet she has not felt good about having to manage people.  Reported her situation among her elderly mother, patient's partner, and herself, remains about statu quo, which is to say that she wants her mother to move in, and neither her mother nor her life partner like that idea.  Patient denied any si/hi, psychosis, cognitive deficits, or substance abuse. Supportive therapy provided.  Plan is to follow up for psychotherapy 5/30/22.          Treatment plan:  · Target symptoms: depression, anxiety , substance abuse, mood swings, grief, relationship tension  · Why chosen therapy is appropriate versus another modality: relevant to diagnosis, patient responds to this modality  · Outcome monitoring methods: self-report, observation  · Therapeutic intervention type: insight oriented psychotherapy, supportive psychotherapy    Risk parameters:  Patient reports no suicidal ideation  Patient reports no homicidal ideation  Patient reports no self-injurious behavior  Patient reports no violent behavior    Verbal deficits: None    Patient's response to intervention:  The patient's response to intervention is accepting.    Progress toward goals and other mental status changes:  The patient's progress toward goals is mixed    Diagnosis:     ICD-10-CM ICD-9-CM   1.  Bipolar disorder, most recent episode depressed  F31.30 296.50   2. ERNESTINE (generalized anxiety disorder)  F41.1 300.02   3. Insomnia, unspecified type  G47.00 780.52   4. Relationship problems  Z63.9 313.3   History of Alcohol use disorder     Plan:  individual psychotherapy and medication management by physician    Return to clinic: as scheduled    Length of Service (minutes): 45

## 2022-06-13 ENCOUNTER — OFFICE VISIT (OUTPATIENT)
Dept: PSYCHIATRY | Facility: CLINIC | Age: 62
End: 2022-06-13
Payer: COMMERCIAL

## 2022-06-13 ENCOUNTER — OFFICE VISIT (OUTPATIENT)
Dept: FAMILY MEDICINE | Facility: CLINIC | Age: 62
End: 2022-06-13
Payer: COMMERCIAL

## 2022-06-13 VITALS
HEIGHT: 66 IN | WEIGHT: 215.19 LBS | SYSTOLIC BLOOD PRESSURE: 128 MMHG | DIASTOLIC BLOOD PRESSURE: 78 MMHG | TEMPERATURE: 97 F | OXYGEN SATURATION: 98 % | HEART RATE: 88 BPM | BODY MASS INDEX: 34.58 KG/M2

## 2022-06-13 DIAGNOSIS — F41.1 GAD (GENERALIZED ANXIETY DISORDER): ICD-10-CM

## 2022-06-13 DIAGNOSIS — F41.9 ANXIETY: ICD-10-CM

## 2022-06-13 DIAGNOSIS — L60.0 INGROWN TOENAIL OF LEFT FOOT: ICD-10-CM

## 2022-06-13 DIAGNOSIS — L03.032 PARONYCHIA OF GREAT TOE OF LEFT FOOT: Primary | ICD-10-CM

## 2022-06-13 DIAGNOSIS — G47.00 INSOMNIA, UNSPECIFIED TYPE: ICD-10-CM

## 2022-06-13 DIAGNOSIS — Z63.9 RELATIONSHIP PROBLEMS: ICD-10-CM

## 2022-06-13 DIAGNOSIS — F31.30 BIPOLAR DISORDER, MOST RECENT EPISODE DEPRESSED: Primary | ICD-10-CM

## 2022-06-13 PROCEDURE — 1159F PR MEDICATION LIST DOCUMENTED IN MEDICAL RECORD: ICD-10-PCS | Mod: CPTII,S$GLB,, | Performed by: FAMILY MEDICINE

## 2022-06-13 PROCEDURE — 1159F MED LIST DOCD IN RCRD: CPT | Mod: CPTII,S$GLB,, | Performed by: FAMILY MEDICINE

## 2022-06-13 PROCEDURE — 99999 PR PBB SHADOW E&M-EST. PATIENT-LVL IV: CPT | Mod: PBBFAC,,, | Performed by: FAMILY MEDICINE

## 2022-06-13 PROCEDURE — 99999 PR PBB SHADOW E&M-EST. PATIENT-LVL I: ICD-10-PCS | Mod: PBBFAC,,, | Performed by: SOCIAL WORKER

## 2022-06-13 PROCEDURE — 3074F SYST BP LT 130 MM HG: CPT | Mod: CPTII,S$GLB,, | Performed by: FAMILY MEDICINE

## 2022-06-13 PROCEDURE — 99999 PR PBB SHADOW E&M-EST. PATIENT-LVL IV: ICD-10-PCS | Mod: PBBFAC,,, | Performed by: FAMILY MEDICINE

## 2022-06-13 PROCEDURE — 90834 PSYTX W PT 45 MINUTES: CPT | Mod: S$GLB,,, | Performed by: SOCIAL WORKER

## 2022-06-13 PROCEDURE — 3008F BODY MASS INDEX DOCD: CPT | Mod: CPTII,S$GLB,, | Performed by: FAMILY MEDICINE

## 2022-06-13 PROCEDURE — 3008F PR BODY MASS INDEX (BMI) DOCUMENTED: ICD-10-PCS | Mod: CPTII,S$GLB,, | Performed by: FAMILY MEDICINE

## 2022-06-13 PROCEDURE — 1160F PR REVIEW ALL MEDS BY PRESCRIBER/CLIN PHARMACIST DOCUMENTED: ICD-10-PCS | Mod: CPTII,S$GLB,, | Performed by: FAMILY MEDICINE

## 2022-06-13 PROCEDURE — 99213 PR OFFICE/OUTPT VISIT, EST, LEVL III, 20-29 MIN: ICD-10-PCS | Mod: S$GLB,,, | Performed by: FAMILY MEDICINE

## 2022-06-13 PROCEDURE — 99213 OFFICE O/P EST LOW 20 MIN: CPT | Mod: S$GLB,,, | Performed by: FAMILY MEDICINE

## 2022-06-13 PROCEDURE — 1160F RVW MEDS BY RX/DR IN RCRD: CPT | Mod: CPTII,S$GLB,, | Performed by: FAMILY MEDICINE

## 2022-06-13 PROCEDURE — 3074F PR MOST RECENT SYSTOLIC BLOOD PRESSURE < 130 MM HG: ICD-10-PCS | Mod: CPTII,S$GLB,, | Performed by: FAMILY MEDICINE

## 2022-06-13 PROCEDURE — 3078F DIAST BP <80 MM HG: CPT | Mod: CPTII,S$GLB,, | Performed by: FAMILY MEDICINE

## 2022-06-13 PROCEDURE — 90834 PR PSYCHOTHERAPY W/PATIENT, 45 MIN: ICD-10-PCS | Mod: S$GLB,,, | Performed by: SOCIAL WORKER

## 2022-06-13 PROCEDURE — 3078F PR MOST RECENT DIASTOLIC BLOOD PRESSURE < 80 MM HG: ICD-10-PCS | Mod: CPTII,S$GLB,, | Performed by: FAMILY MEDICINE

## 2022-06-13 PROCEDURE — 99999 PR PBB SHADOW E&M-EST. PATIENT-LVL I: CPT | Mod: PBBFAC,,, | Performed by: SOCIAL WORKER

## 2022-06-13 RX ORDER — CEPHALEXIN 500 MG/1
500 CAPSULE ORAL 2 TIMES DAILY
Qty: 20 CAPSULE | Refills: 0 | Status: SHIPPED | OUTPATIENT
Start: 2022-06-13 | End: 2022-08-09 | Stop reason: SDUPTHER

## 2022-06-13 SDOH — SOCIAL DETERMINANTS OF HEALTH (SDOH): PROBLEM RELATED TO PRIMARY SUPPORT GROUP, UNSPECIFIED: Z63.9

## 2022-06-13 NOTE — PROGRESS NOTES
CHIEF COMPLAINT:  This is a 62-year-old female complaining of possible toe infection.    SUBJECTIVE:  The patient complains of 1 month history of swelling and tenderness along medial surface of left great toenail.  She denies fever, chills or purulent discharge.    Patient takes pantoprazole for GERD and Seroquel, Lamictal, and Buspar for bipolar disorder. She was recently prescribed temazepam 15 mg for sleeplessness. She has a history of mixed hyperlipidemia.     ROS:  GENERAL: Patient denies fever, chills, night sweats.  Patient denies weight gain or loss. Patient denies anorexia, fatigue, weakness or swollen glands.  SKIN: Patient denies rash.  LUNGS: Patient denies cough, wheeze or hemoptysis.  CARDIOVASCULAR: Patient denies chest pain, shortness of breath, palpitations, syncope or lower extremity edema.  GI: Patient denies abdominal pain, nausea, vomiting, diarrhea, constipation, blood in stool or melena.  MUSCULOSKELETAL: Patient denies joint pain, swelling, redness or warmth.  NEUROLOGIC: Patient denies headache, vertigo, paresthesias, weakness in limb, dysarthria, dysphagia or abnormality of gait.  PSYCHIATRIC: Patient denies depression, anxiety or memory loss.    OBJECTIVE:   GENERAL: Well-developed well-nourished obese white female alert and oriented x3, in no acute distress. Memory, judgment and cognition without deficit.   SKIN: Clear without rash. Normal color and tone.   HEENT: Eyes: Clear conjunctivae. No scleral icterus.   NECK: Supple, normal range of motion.   LUNGS:  Normal respiratory effort.  EXTREMITIES: Without cyanosis, clubbing or edema. Distal pulses 2+ and equal. Normal range of motion in all extremities. No joint effusion, erythema or warmth.  Paronychial swelling and erythema with mild to moderate tenderness to palpation along medial surface of left great toe.  Spicule of nail ingrowing into skin.  NEUROLOGIC:  Gait without abnormality. No tremor.     ASSESSMENT:  1. Paronychia of great  toe of left foot    2. Ingrown toenail of left foot      PLAN:   1. Keflex 500 mg twice daily for 10 days.  2. Clip nail spicule straight across.  3. Consider toenail removal if no improvement.    20 minutes of total time spent on the encounter, which includes face to face time and non-face to face time preparing to see the patient (eg, review of tests), Obtaining and/or reviewing separately obtained history, Documenting clinical information in the electronic or other health record, Independently interpreting results (not separately reported) and communicating results to the patient/family/caregiver, or Care coordination (not separately reported).     This note is generated with speech recognition software and is subject to transcription error and sound alike phrases that may be missed by proofreading.

## 2022-06-14 ENCOUNTER — OFFICE VISIT (OUTPATIENT)
Dept: PSYCHIATRY | Facility: CLINIC | Age: 62
End: 2022-06-14
Payer: COMMERCIAL

## 2022-06-14 DIAGNOSIS — F31.30 BIPOLAR DISORDER, MOST RECENT EPISODE DEPRESSED: Primary | ICD-10-CM

## 2022-06-14 DIAGNOSIS — F41.9 ANXIETY: ICD-10-CM

## 2022-06-14 DIAGNOSIS — G47.00 INSOMNIA, UNSPECIFIED TYPE: ICD-10-CM

## 2022-06-14 PROCEDURE — 99214 OFFICE O/P EST MOD 30 MIN: CPT | Mod: 95,,, | Performed by: PSYCHIATRY & NEUROLOGY

## 2022-06-14 PROCEDURE — 99214 PR OFFICE/OUTPT VISIT, EST, LEVL IV, 30-39 MIN: ICD-10-PCS | Mod: 95,,, | Performed by: PSYCHIATRY & NEUROLOGY

## 2022-06-14 RX ORDER — BUSPIRONE HYDROCHLORIDE 30 MG/1
30 TABLET ORAL 2 TIMES DAILY
Qty: 60 TABLET | Refills: 2 | Status: SHIPPED | OUTPATIENT
Start: 2022-06-14 | End: 2022-07-04

## 2022-06-14 RX ORDER — TEMAZEPAM 7.5 MG/1
CAPSULE ORAL
Qty: 90 CAPSULE | Refills: 2 | Status: SHIPPED | OUTPATIENT
Start: 2022-06-14 | End: 2022-08-23 | Stop reason: SDUPTHER

## 2022-06-14 RX ORDER — OXCARBAZEPINE 150 MG/1
150 TABLET, FILM COATED ORAL 2 TIMES DAILY
Qty: 60 TABLET | Refills: 2 | Status: SHIPPED | OUTPATIENT
Start: 2022-06-14 | End: 2022-09-15

## 2022-06-14 NOTE — PROGRESS NOTES
"Outpatient Psychiatry Follow-up Visit (MD/NP)    2022    June Stevens, a 62 y.o. female, presenting for follow-up visit. Met with patient.    Reason for Encounter: Chief complaint/reason for encounter: addictive disorder, depression, mood swings, anxiety, behavior and interpersonal    Interval Hx: Patient seen and interviewed for follow-up, about 3 months since last visit. This was a VIDEO VISIT. She was at home. Reports no new medical problems since last visit. No new medications. Ongoing stressors related to care of mother. Ongoing participation in psychotherapy with David. Adherent to medications. Ongoing therapeutic benefit. Denies side effects.     Background: Pt is a 58 y/o F who presents for establishment of psychiatric care, previously evaluated by Mr. Junito Costa. From his note: reporting chief complaint of "my partner thought since I am getting medication, I ought probably to have some talk therapy as well." Pt described a history of mood swings & recurrent depression going back to her teenage years, a recurrent struggle with anger & angry outbursts, & poor coping skills, relying too much on binge drinking (less than weekly, but always to a point of heavy intoxication) to blot out anger & anxiety. She described a fragile self-esteem, pattern of defensiveness, heightened irritability, & frequent depressive cycles. She believes she has abandonment issues from her parents having left for [place] abruptly when she was just 4 years old, leaving her & her younger sister with a grandmother. Mom returned alone after 1 year but never fully embodied a parental figure to the pt. Her grandmother, who represented stability,  when the pt was 27. She recalls mood issues going back to [employer] age, age 12 or 13. She said that was when social issues at school started to become more difficult as she & those around her started noticing she wasn't conforming to stereotypical feminine norms & came to be " "suspected of being a lesbian. She described an emotional struggle between her Worship beliefs & growing awareness of her same-sex orientation. She said she tried to date boys. She reported a lot of peer rejection, being known as "a alcides lesbian," & she attempted suicide at age 17, stepping out in front of an oncoming car. By age 19 she had been introduced to the bhandari bar scene, which she experienced as a crucial affirmation that she isn't alone, but which met with her mother's disapproval; it took her mother years to become more accepting.  She described her current living situation as a split between 2 households, 1 with her partner of 25 years, who is older & "very controlling," & the other with the pt's mother. She reports tension in her primary relationship that involves infidelity on her part back in 2009, an affair stemming from a high school reunion. She said her partner now polices her, & she feels resentful but also doesn't want to leave her partner over it. The pt denied any suicidal or homicidal ideation, though endorsing remote history of a suicidal episode as a teenager.  Denied any cognitive deficit, psychosis, mood swings, or recreational drug use. Endorsed past struggle with some alcohol abuse, a period of abstinence with AA participation, & now periodic binges. Endorses struggle with explosive anger. Identified therapeutic goals include reduction of depression & anxiety, improved self-esteem, & coping skills, including anger management, assertiveness communication skills.        Symptoms:   ? Mood: depressed mood, diminished interest and worthlessness/guilt  ? Anxiety: excessive anxiety/worry, restlessness/keyed up and irritability  ? Substance abuse: substance tolerance and take more than intended  ? Cognitive functioning: denied  ? Health behaviors: some alcohol binges     Psychiatric history: psychotropic management by PCP & prior suicide attempt(s); no chemical abuse treatment reported but did " "attend AA for years & had a sponsor; now relapsed with a binge pattern of use. Family history of psychiatric illness: father bipolar; sister with clinical anxiety. Social history: See history above; oldest of 3; younger brother drowned as an infant. Younger sister is her closest friend. Chaotic childhood; parents were very young; left pt & younger sister with grandmother when she was 4; father never returned; mother came back after 1 year. Grandmother, an aunt, & uncle were parental figures. Pt described growing social tension in school in middle school & high school, related to her budding identity as a masculine lesbian. Reported running away from school & home several times in teens. Attempted suicide at 17. Discovered bhandari bars at 19 found the start of a sort of community. 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 Current committed relationship of the past 25 years; described partner as controling, domineering, but "she takes care of me." Endorsed tension of pt's past infidelity a decade ago. Previous longest relationship was for 5 years. No biological children of her own. Never in a relationship with a man. Loves animals; finds them easier to relate to than people. Currently lives between 2 houses, one with her partner, the other with her mother.       Substance use:   Alcohol: binge pattern of abuse; denied any known health damage from alcohol   Drugs: none   Tobacco: none   Caffeine: average of 2 and a half cups of coffee daily    Pt confirms the above history. Reports that she's been anxious her "entire life"; overriding distress & impairment. Past dx'es include depression, bipolar disorder (some impulsive shopping). Meds through Dr. Bee Landon for many years, but stopped seeing Dr. Zhong's in summer 2019. Found treatment unaffordable. Off medicine for 3-4 months. "had some left but thought it wasn't working, it makes me gain weight, & I'm sluggish". Thinks that these " "things improved off the medication.     "got an ultimatum" to get back onto medication by her partner who patient says has observed that patient is less irritable & agitated on medication (but she doesn't see this herself).     Endorses "yes" to most items on MDQ    Psych Hx: "anger issues" since a kid. "fought a lot", "usually defending other people", "I couldn't verbally say what I felt, so I thought", though rarely disciplinary problems in school. Protected her sister, friends.     Treated with medication by PCP in  or , dx'ed with depression in - by Dr. Zhong. Diagnosis later changed to bipolar disorder, but she doesn't believe this matches her. She's read about borderline personality & thinks that features of this diagnosis better match her understanding of herself. Was mostly off medication after Dr. Landon left private practice.     Went back to Dr. Landon in  when she became available again, has seen her since & has continued on her current regimen. Was seeing a therapist that works with her, Namita Lopez. They recommended a long-term treatment program in [place] at one point.     Reports mood problems have led her to be passed up for promotions, advancement.     Denies periods of mood elevation that have led to dysfunction or     Family Hx: father   Social Hx: as above. father left at age 6. Wasn't in her life after that, but sought him out at 41, but was disappointed by the relationship because he wanted money from her.   Has a younger sister & younger brother. Brother , drowned in the bathtub.   Lives between two households (between mother's and partner's). Mother is critical.   Master's degree. Works for the [employer].   No arrests.     Review Of Systems:     GENERAL:  No weight gain or loss  SKIN:  No rashes or lacerations  HEAD:  No headaches  CHEST:  No shortness of breath, hyperventilation or cough  CARDIOVASCULAR:  No tachycardia or chest pain  ABDOMEN:  No nausea, vomiting, pain, " constipation or diarrhea  URINARY:  No frequency, dysuria or sexual dysfunction  ENDOCRINE:  No polydipsia, polyuria  MUSCULOSKELETAL:  No pain or stiffness of the joints  NEUROLOGIC:  No weakness, sensory changes, seizures, confusion, memory loss, tremor or other abnormal movements    Current Evaluation:     Nutritional Screening: Considering the patient's height and weight, medications, medical history and preferences, should a referral be made to the dietitian? no    Constitutional  Vitals:  Most recent vital signs, dated less than 90 days prior to this appointment, were reviewed.       General:  unremarkable, age appropriate     Musculoskeletal  Muscle Strength/Tone:  no tremor, no tic   Gait & Station:  non-ataxic     Psychiatric  Appearance: casually dressed & groomed;   Behavior: calm,   Cooperation: cooperative with assessment  Speech: normal rate, volume, tone  Thought Process: linear, goal-directed  Thought Content: No suicidal or homicidal ideation; no delusions  Affect: anxious  Mood: anxious  Perceptions: No auditory or visual hallucinations  Level of Consciousness: alert throughout interview  Insight: fair  Cognition: Oriented to person, place, time, & situation  Memory: no apparent deficits to general clinical interview; not formally assessed  Attention/Concentration: no apparent deficits to general clinical interview; not formally assessed  Fund of Knowledge: average by vocabulary/education    Laboratory Data  No visits with results within 1 Month(s) from this visit.   Latest known visit with results is:   Lab Visit on 01/31/2022   Component Date Value Ref Range Status    Sodium 01/31/2022 141  136 - 145 mmol/L Final    Potassium 01/31/2022 4.7  3.5 - 5.1 mmol/L Final    Chloride 01/31/2022 103  95 - 110 mmol/L Final    CO2 01/31/2022 30 (A) 23 - 29 mmol/L Final    Glucose 01/31/2022 91  70 - 110 mg/dL Final    BUN 01/31/2022 9  8 - 23 mg/dL Final    Creatinine 01/31/2022 0.9  0.5 - 1.4 mg/dL  Final    Calcium 01/31/2022 9.8  8.7 - 10.5 mg/dL Final    Total Protein 01/31/2022 8.1  6.0 - 8.4 g/dL Final    Albumin 01/31/2022 3.6  3.5 - 5.2 g/dL Final    Total Bilirubin 01/31/2022 0.5  0.1 - 1.0 mg/dL Final    Alkaline Phosphatase 01/31/2022 115  55 - 135 U/L Final    AST 01/31/2022 18  10 - 40 U/L Final    ALT 01/31/2022 14  10 - 44 U/L Final    Anion Gap 01/31/2022 8  8 - 16 mmol/L Final    eGFR if African American 01/31/2022 >60.0  >60 mL/min/1.73 m^2 Final    eGFR if non African American 01/31/2022 >60.0  >60 mL/min/1.73 m^2 Final    WBC 01/31/2022 6.97  3.90 - 12.70 K/uL Final    RBC 01/31/2022 4.86  4.00 - 5.40 M/uL Final    Hemoglobin 01/31/2022 12.9  12.0 - 16.0 g/dL Final    Hematocrit 01/31/2022 43.1  37.0 - 48.5 % Final    MCV 01/31/2022 89  82 - 98 fL Final    MCH 01/31/2022 26.5 (A) 27.0 - 31.0 pg Final    MCHC 01/31/2022 29.9 (A) 32.0 - 36.0 g/dL Final    RDW 01/31/2022 14.3  11.5 - 14.5 % Final    Platelets 01/31/2022 425  150 - 450 K/uL Final    MPV 01/31/2022 10.0  9.2 - 12.9 fL Final    Immature Granulocytes 01/31/2022 0.1  0.0 - 0.5 % Final    Gran # (ANC) 01/31/2022 4.1  1.8 - 7.7 K/uL Final    Immature Grans (Abs) 01/31/2022 0.01  0.00 - 0.04 K/uL Final    Lymph # 01/31/2022 2.0  1.0 - 4.8 K/uL Final    Mono # 01/31/2022 0.7  0.3 - 1.0 K/uL Final    Eos # 01/31/2022 0.2  0.0 - 0.5 K/uL Final    Baso # 01/31/2022 0.08  0.00 - 0.20 K/uL Final    nRBC 01/31/2022 0  0 /100 WBC Final    Gran % 01/31/2022 58.8  38.0 - 73.0 % Final    Lymph % 01/31/2022 28.0  18.0 - 48.0 % Final    Mono % 01/31/2022 9.6  4.0 - 15.0 % Final    Eosinophil % 01/31/2022 2.4  0.0 - 8.0 % Final    Basophil % 01/31/2022 1.1  0.0 - 1.9 % Final    Differential Method 01/31/2022 Automated   Final    Cholesterol 01/31/2022 202 (A) 120 - 199 mg/dL Final    Triglycerides 01/31/2022 120  30 - 150 mg/dL Final    HDL 01/31/2022 50  40 - 75 mg/dL Final    LDL Cholesterol 01/31/2022 128.0   63.0 - 159.0 mg/dL Final    HDL/Cholesterol Ratio 01/31/2022 24.8  20.0 - 50.0 % Final    Total Cholesterol/HDL Ratio 01/31/2022 4.0  2.0 - 5.0 Final    Non-HDL Cholesterol 01/31/2022 152  mg/dL Final    TSH 01/31/2022 2.290  0.400 - 4.000 uIU/mL Final     Medications  Outpatient Encounter Medications as of 6/14/2022   Medication Sig Dispense Refill    busPIRone (BUSPAR) 30 MG Tab Take 1 tablet (30 mg total) by mouth 2 (two) times daily. 60 tablet 2    cephALEXin (KEFLEX) 500 MG capsule Take 1 capsule (500 mg total) by mouth 2 (two) times daily. for 10 days 20 capsule 0    clotrimazole-betamethasone 1-0.05% (LOTRISONE) cream Apply topically 2 (two) times daily. 1 each 1    fluconazole (DIFLUCAN) 100 MG tablet Take 1 tablet (100 mg total) by mouth once daily. 10 tablet 0    ibuprofen (ADVIL,MOTRIN) 800 MG tablet Take 1 tablet (800 mg total) by mouth 3 (three) times daily as needed. 60 tablet 0    lamoTRIgine (LAMICTAL) 150 MG Tab Take 1 tablet (150 mg total) by mouth once daily. 30 tablet 2    pantoprazole (PROTONIX) 40 MG tablet TAKE 1 TABLET BY MOUTH EVERY MORNING 30 MINUTES BEFORE EATING MEAL 30 tablet 11    temazepam (RESTORIL) 7.5 MG Cap Take 2 to 3 at bedtime as needed for sleep. 90 capsule 2    valACYclovir (VALTREX) 1000 MG tablet Take 2 tablets (2,000 mg total) by mouth every 12 (twelve) hours. 4 tablet 0     No facility-administered encounter medications on file as of 6/14/2022.     Assessment - Diagnosis - Goals:     Impression: Patient is a 61 y/o F with chronic bipolar disorder, with active major depressive episode despite adherence with medication; has hx of remote substance use disorders. Some progress on insomnia.    Dx: bipolar I disorder, mre depressed; anxiety    Treatment Goals:  Specify outcomes written in observable, behavioral terms: prevent recurrences.    Treatment Plan/Recommendations:   · Lamotrigine, buspirone for anxiety. Temazepam to 22.5 mg qhs prn sleep.  · Psychotherapy  prn.  · Discussed risks, benefits, and alternatives to treatment plan documented above with patient. I answered all patient questions related to this plan and patient expressed understanding and agreement.     Return to Clinic: 4 months    TIANNA. Gato Soriano MD  Psychiatry  [facility]  [address] , [place] [address]  [phone]

## 2022-06-28 ENCOUNTER — TELEPHONE (OUTPATIENT)
Dept: PSYCHIATRY | Facility: CLINIC | Age: 62
End: 2022-06-28
Payer: COMMERCIAL

## 2022-06-28 RX ORDER — LAMOTRIGINE 150 MG/1
150 TABLET ORAL DAILY
Qty: 30 TABLET | Refills: 2 | Status: SHIPPED | OUTPATIENT
Start: 2022-06-28 | End: 2022-08-23

## 2022-06-28 NOTE — TELEPHONE ENCOUNTER
Nurse called the patient with instructions from provider. Nurse informed patient that the provider sent lamotrigine to the pharmacy. Nurse instructed  Ms. Stevens and instruct her to stop trileptal and restart her previous medication dose of lamotrigine per the provider. Patient stated okay and thank you.

## 2022-06-28 NOTE — TELEPHONE ENCOUNTER
Patient called regarding the Tripleptal 150 mg. She stated that she does not like the way she is feeling. She stated that she started cry a lot and on yesterday she almost called the suicide hotline. She stated she had not done that in a long time. She stated that she was scared to because she did not want the police to come out and she go to the hospital for 3 days and lose her job. She stated that she did no want to kill herself, just did not want to be here. She stated she did not have a plan. I ask her when this start and she stated on Friday. I asked her when did she start the medication, she stated last week or the week before. [facility] and that [facility].

## 2022-06-30 ENCOUNTER — PATIENT MESSAGE (OUTPATIENT)
Dept: PSYCHIATRY | Facility: CLINIC | Age: 62
End: 2022-06-30
Payer: COMMERCIAL

## 2022-07-25 ENCOUNTER — PATIENT MESSAGE (OUTPATIENT)
Dept: PSYCHIATRY | Facility: CLINIC | Age: 62
End: 2022-07-25
Payer: COMMERCIAL

## 2022-07-25 ENCOUNTER — OFFICE VISIT (OUTPATIENT)
Dept: PSYCHIATRY | Facility: CLINIC | Age: 62
End: 2022-07-25
Payer: COMMERCIAL

## 2022-07-25 DIAGNOSIS — F31.30 BIPOLAR DISORDER, MOST RECENT EPISODE DEPRESSED: Primary | ICD-10-CM

## 2022-07-25 DIAGNOSIS — Z63.9 FAMILY DYNAMICS PROBLEM: ICD-10-CM

## 2022-07-25 DIAGNOSIS — F41.9 ANXIETY: ICD-10-CM

## 2022-07-25 DIAGNOSIS — G47.00 INSOMNIA, UNSPECIFIED TYPE: ICD-10-CM

## 2022-07-25 PROCEDURE — 90834 PSYTX W PT 45 MINUTES: CPT | Mod: S$GLB,,, | Performed by: SOCIAL WORKER

## 2022-07-25 PROCEDURE — 99999 PR PBB SHADOW E&M-EST. PATIENT-LVL I: ICD-10-PCS | Mod: PBBFAC,,, | Performed by: SOCIAL WORKER

## 2022-07-25 PROCEDURE — 90834 PR PSYCHOTHERAPY W/PATIENT, 45 MIN: ICD-10-PCS | Mod: S$GLB,,, | Performed by: SOCIAL WORKER

## 2022-07-25 PROCEDURE — 99999 PR PBB SHADOW E&M-EST. PATIENT-LVL I: CPT | Mod: PBBFAC,,, | Performed by: SOCIAL WORKER

## 2022-07-25 SDOH — SOCIAL DETERMINANTS OF HEALTH (SDOH): PROBLEM RELATED TO PRIMARY SUPPORT GROUP, UNSPECIFIED: Z63.9

## 2022-07-25 NOTE — PROGRESS NOTES
Individual Psychotherapy (PhD/LCSW)    5/3/21    Site:  [facility]             Therapeutic Intervention: Met with patient via virtual visit.  Outpatient - Insight oriented psychotherapy 45 min - CPT code 15346 and Outpatient - Supportive psychotherapy 45 min - CPT Code 62074    Chief complaint/reason for encounter: depression, mood swings, anxiety, grief and loss, and interpersonal     Interval history and content of current session:   Late entry for 6/13/22. Previously seen 3/28/22.   62 year old female patient returned for psychotherapy for biplar depression and anxiety in context of grief and family interpersonal stress.  Patient reported her situation with her two homes remains essentially unchanged, living part-tie between her house with her long-time partner and her house with her own mother.  She had purchased that house during a time when she was  after a manic episode led to some infidelity on her part.  She reported she continues efforts to convince both her loved ones of the need to consolidate the household.  Neither of them wants this; neither wants to live with the other.  Only the patient is pushing for this.  She cannot bring herself to detach enough from her mother to simply visit her regularly.  She repeats the sentiment that because the cats and dogs now in that home are mostly hers, she cannot possibly leave them behind or move them to her other house, because of other pets at that house.  She said she feels responsible for the care of all but also feels that changes to their housing arrangement are not feasible.  She endorsed slim to no likelihood that her partner or mother will ever change their minds and agree with her.  Discussed with the patient the question of her own difficulty with adapting and releasing her hold on the idea that she is responsible to take care of her mother, who is not demanding it of her.  Patient denied any si/hi, psychosis, cognitive deficits, or substance  abuse. Supportive therapy provided.  Plan is to follow up for psychotherapy 7/25/22.          Treatment plan:  · Target symptoms: depression, anxiety , substance abuse, mood swings, grief, relationship tension  · Why chosen therapy is appropriate versus another modality: relevant to diagnosis, patient responds to this modality  · Outcome monitoring methods: self-report, observation  · Therapeutic intervention type: insight oriented psychotherapy, supportive psychotherapy    Risk parameters:  Patient reports no suicidal ideation  Patient reports no homicidal ideation  Patient reports no self-injurious behavior  Patient reports no violent behavior    Verbal deficits: None    Patient's response to intervention:  The patient's response to intervention is accepting.    Progress toward goals and other mental status changes:  The patient's progress toward goals is mixed    Diagnosis:     ICD-10-CM ICD-9-CM   1. Bipolar disorder, most recent episode depressed  F31.30 296.50   2. Anxiety  F41.9 300.00   3. Insomnia, unspecified type  G47.00 780.52   4. ERNESTINE (generalized anxiety disorder)  F41.1 300.02   5. Relationship problems  Z63.9 313.3   History of Alcohol use disorder     Plan:  individual psychotherapy and medication management by physician    Return to clinic: as scheduled    Length of Service (minutes): 45

## 2022-07-25 NOTE — PROGRESS NOTES
"Individual Psychotherapy (PhD/LCSW)    5/3/21    Site:  [facility]             Therapeutic Intervention: Met with patient in clinic.  Met with patient and partner.  Outpatient - Insight oriented psychotherapy 45 min - CPT code 96209 and Outpatient - Supportive psychotherapy 45 min - CPT Code 14624    Chief complaint/reason for encounter: depression, mood swings, anxiety, grief and loss, and interpersonal     Interval history and content of current session:   62 year old female patient returned for psychotherapy for biplar depression and anxiety. Previously seen 6/13/22.  Patient was joined in session by her partner of 28 years, who came because she said she wanted to report her own alarm and concern at symptoms she is observing in the patient.  Patient was tearful throughout the session, verbalizing obsessive thoughts about suffering in the world, and particularly about animal suffering.  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  She is also inconsolable since the [place] v [place] [address] reversal.  Patient stated her issue is suffering in the world.  She is convinced there should not be suffering, and she "has to" do ]her] part" to fight injustice and suffering.  Discussed questions of how much she is suffering, herself, by focusing so much on others everywhere, and in that context also how much good she thinks she accomplishes by lecturing people who make their own decisions, who react rather unappreciatively.  Discussed also her home situation and continued inadequate sleep.  Partner expressing great concern that her sleep aid medication is inadequate.  Partner expressed desire for patient to learn healthier boundaries with her mother, who is content to accept any assistance the patient gives her but never offers any affirmation.  Partner " proposed the patient start bring the dog at mother's place over to get acclimated to the couple's home and allow the house cats there to adjust, with intention of moving him there permanently and letting the mother care for the remaining cats, which is a fairly easy task.   Patient did not reject this idea out of hand.  Patient denied any si/hi, psychosis, cognitive deficits, or substance abuse. Supportive therapy provided.  Plan is to follow up for psychotherapy 8/29/22.          Treatment plan:  · Target symptoms: depression, anxiety , substance abuse, mood swings, grief, relationship tension  · Why chosen therapy is appropriate versus another modality: relevant to diagnosis, patient responds to this modality  · Outcome monitoring methods: self-report, observation  · Therapeutic intervention type: insight oriented psychotherapy, supportive psychotherapy    Risk parameters:  Patient reports no suicidal ideation  Patient reports no homicidal ideation  Patient reports no self-injurious behavior  Patient reports no violent behavior    Verbal deficits: None    Patient's response to intervention:  The patient's response to intervention is accepting.    Progress toward goals and other mental status changes:  The patient's progress toward goals is mixed    Diagnosis:     ICD-10-CM ICD-9-CM   1. Bipolar disorder, most recent episode depressed  F31.30 296.50   2. Anxiety  F41.9 300.00   3. Insomnia, unspecified type  G47.00 780.52   4. Family dynamics problem  Z63.9 V61.9   History of Alcohol use disorder     Plan:  individual psychotherapy and medication management by physician    Return to clinic: as scheduled    Length of Service (minutes): 45

## 2022-08-08 ENCOUNTER — PATIENT MESSAGE (OUTPATIENT)
Dept: FAMILY MEDICINE | Facility: CLINIC | Age: 62
End: 2022-08-08
Payer: COMMERCIAL

## 2022-08-09 ENCOUNTER — PATIENT MESSAGE (OUTPATIENT)
Dept: FAMILY MEDICINE | Facility: CLINIC | Age: 62
End: 2022-08-09
Payer: COMMERCIAL

## 2022-08-09 RX ORDER — CEPHALEXIN 500 MG/1
500 CAPSULE ORAL 2 TIMES DAILY
Qty: 20 CAPSULE | Refills: 0 | Status: SHIPPED | OUTPATIENT
Start: 2022-08-09 | End: 2022-08-19

## 2022-08-19 ENCOUNTER — OFFICE VISIT (OUTPATIENT)
Dept: PSYCHIATRY | Facility: CLINIC | Age: 62
End: 2022-08-19
Payer: COMMERCIAL

## 2022-08-19 DIAGNOSIS — F41.9 ANXIETY: ICD-10-CM

## 2022-08-19 DIAGNOSIS — Z63.9 FAMILY DYNAMICS PROBLEM: ICD-10-CM

## 2022-08-19 DIAGNOSIS — F41.1 GAD (GENERALIZED ANXIETY DISORDER): ICD-10-CM

## 2022-08-19 DIAGNOSIS — F31.30 BIPOLAR DISORDER, MOST RECENT EPISODE DEPRESSED: Primary | ICD-10-CM

## 2022-08-19 PROCEDURE — 90834 PSYTX W PT 45 MINUTES: CPT | Mod: 95,,, | Performed by: SOCIAL WORKER

## 2022-08-19 PROCEDURE — 90834 PR PSYCHOTHERAPY W/PATIENT, 45 MIN: ICD-10-PCS | Mod: 95,,, | Performed by: SOCIAL WORKER

## 2022-08-19 SDOH — SOCIAL DETERMINANTS OF HEALTH (SDOH): PROBLEM RELATED TO PRIMARY SUPPORT GROUP, UNSPECIFIED: Z63.9

## 2022-08-19 NOTE — PROGRESS NOTES
Individual Psychotherapy (PhD/LCSW)    5/3/21    Site:  [place]    --Via virtual visit with synchronous audio and video.  Patient presented at place of work, in the [facility].   Each patient to whom medical services by telemedicine is provided is:  (1) informed of the relationship between the physician and patient and the respective role of any other health care provider with respect to management of the patient; and (2) notified that he or she may decline to receive medical services by telemedicine and may withdraw from such care at any time.         Therapeutic Intervention: Met with patient in clinic.  Met with patient and partner.  Outpatient - Insight oriented psychotherapy 45 min - CPT code 40073 and Outpatient - Supportive psychotherapy 45 min - CPT Code 84898    Chief complaint/reason for encounter: depression, mood swings, anxiety, grief and loss, and interpersonal tensions.      Interval history and content of current session:   62 year old female patient returned for psychotherapy, via virtual visit from work, for biplar depression and anxiety. The patient had been scheduled to follow up 8/29/22 but messaged some distress and need for an added session.  This opening became available.  The patient endorsed emotional struggle with relatively newfound personal commitment to a vegan dietary lifestyle, but she finds herself ruminating on her past nearly 50 years of eating meat.  She has been tearful, ruminating, wallowing in guilt feelings for her own past consumption of meat, obsessing about the worst aspects of animal guevara houses.  Also admitted to being heavy-handed and preaching at her loved ones.  Her partner has tried patiently to be supportive.  She also talked about kittens she has just helped someone to foster, and stated her awareness that those kittens could very well grow up to torture and kill birds and other creatures.  We discussed roots of her guilt feelings.  Also discussed  ways to seek some healthy perspective in regard to human cruelty to animals vs nature's cruelty to animals as well.  Discussed points of self-talk the patient could practice with herself to work past her crippling guilt feelings.  Patient did not reject this idea out of hand.  Patient denied any si/hi, psychosis, cognitive deficits, or substance abuse. Supportive therapy provided.  Plan is to follow up for psychotherapy 8/29/22.          Treatment plan:  · Target symptoms: depression, anxiety , substance abuse, mood swings, grief, relationship tension  · Why chosen therapy is appropriate versus another modality: relevant to diagnosis, patient responds to this modality  · Outcome monitoring methods: self-report, observation  · Therapeutic intervention type: insight oriented psychotherapy, supportive psychotherapy    Risk parameters:  Patient reports no suicidal ideation  Patient reports no homicidal ideation  Patient reports no self-injurious behavior  Patient reports no violent behavior    Verbal deficits: None    Patient's response to intervention:  The patient's response to intervention is accepting.    Progress toward goals and other mental status changes:  The patient's progress toward goals is mixed    Diagnosis:     ICD-10-CM ICD-9-CM   1. Bipolar disorder, most recent episode depressed  F31.30 296.50   2. Anxiety  F41.9 300.00   3. ERNESTINE (generalized anxiety disorder)  F41.1 300.02   4. Family dynamics problem  Z63.9 V61.9   History of Alcohol use disorder     Plan:  individual psychotherapy and medication management by physician    Return to clinic: as scheduled    Length of Service (minutes): 45

## 2022-08-23 ENCOUNTER — OFFICE VISIT (OUTPATIENT)
Dept: PSYCHIATRY | Facility: CLINIC | Age: 62
End: 2022-08-23
Payer: COMMERCIAL

## 2022-08-23 DIAGNOSIS — F41.1 GAD (GENERALIZED ANXIETY DISORDER): ICD-10-CM

## 2022-08-23 DIAGNOSIS — F31.30 BIPOLAR DISORDER, MOST RECENT EPISODE DEPRESSED: Primary | ICD-10-CM

## 2022-08-23 PROCEDURE — 90833 PR PSYCHOTHERAPY W/PATIENT W/E&M, 30 MIN (ADD ON): ICD-10-PCS | Mod: 95,,, | Performed by: PSYCHIATRY & NEUROLOGY

## 2022-08-23 PROCEDURE — 99214 PR OFFICE/OUTPT VISIT, EST, LEVL IV, 30-39 MIN: ICD-10-PCS | Mod: 95,,, | Performed by: PSYCHIATRY & NEUROLOGY

## 2022-08-23 PROCEDURE — 90833 PSYTX W PT W E/M 30 MIN: CPT | Mod: 95,,, | Performed by: PSYCHIATRY & NEUROLOGY

## 2022-08-23 PROCEDURE — 99214 OFFICE O/P EST MOD 30 MIN: CPT | Mod: 95,,, | Performed by: PSYCHIATRY & NEUROLOGY

## 2022-08-23 RX ORDER — TEMAZEPAM 7.5 MG/1
CAPSULE ORAL
Qty: 90 CAPSULE | Refills: 2 | Status: SHIPPED | OUTPATIENT
Start: 2022-08-23 | End: 2022-11-21 | Stop reason: SDUPTHER

## 2022-08-23 RX ORDER — BUSPIRONE HYDROCHLORIDE 30 MG/1
TABLET ORAL
Qty: 180 TABLET | Refills: 0 | Status: SHIPPED | OUTPATIENT
Start: 2022-08-23 | End: 2023-01-30 | Stop reason: SDUPTHER

## 2022-08-23 NOTE — PROGRESS NOTES
"Outpatient Psychiatry Follow-up Visit (MD/NP)    2022    June Stevens, a 62 y.o. female, presenting for follow-up visit. Met with patient.    Reason for Encounter: Chief complaint/reason for encounter: addictive disorder, depression, mood swings, anxiety, behavior and interpersonal    Interval Hx: Patient seen and interviewed for follow-up, about 3 months since last visit. This was a VIDEO VISIT. She was at home. More depressed, irritable following watching a documentary about factory farming. Returned to previous higher lamotrigine dose Two visits with David Costa since last visit.   Adherent with temazepam.    Background: Pt is a 60 y/o F who presents for establishment of psychiatric care, previously evaluated by Mr. Junito Costa. From his note: reporting chief complaint of "my partner thought since I am getting medication, I ought probably to have some talk therapy as well." Pt described a history of mood swings & recurrent depression going back to her teenage years, a recurrent struggle with anger & angry outbursts, & poor coping skills, relying too much on binge drinking (less than weekly, but always to a point of heavy intoxication) to blot out anger & anxiety. She described a fragile self-esteem, pattern of defensiveness, heightened irritability, & frequent depressive cycles. She believes she has abandonment issues from her parents having left for [place] abruptly when she was just 4 years old, leaving her & her younger sister with a grandmother. Mom returned alone after 1 year but never fully embodied a parental figure to the pt. Her grandmother, who represented stability,  when the pt was 27. She recalls mood issues going back to Jr High age, age 12 or 13. She said that was when social issues at school started to become more difficult as she & those around her started noticing she wasn't conforming to stereotypical feminine norms & came to be suspected of being a lesbian. She described an " "emotional struggle between her Taoism beliefs & growing awareness of her same-sex orientation. She said she tried to date boys. She reported a lot of peer rejection, being known as "a alcides lesbian," & she attempted suicide at age 17, stepping out in front of an oncoming car. By age 19 she had been introduced to the bhandari bar scene, which she experienced as a crucial affirmation that she isn't alone, but which met with her mother's disapproval; it took her mother years to become more accepting.  She described her current living situation as a split between 2 households, 1 with her partner of 25 years, who is older & "very controlling," & the other with the pt's mother. She reports tension in her primary relationship that involves infidelity on her part back in 2009, an affair stemming from a high school reunion. She said her partner now polices her, & she feels resentful but also doesn't want to leave her partner over it. The pt denied any suicidal or homicidal ideation, though endorsing remote history of a suicidal episode as a teenager.  Denied any cognitive deficit, psychosis, mood swings, or recreational drug use. Endorsed past struggle with some alcohol abuse, a period of abstinence with AA participation, & now periodic binges. Endorses struggle with explosive anger. Identified therapeutic goals include reduction of depression & anxiety, improved self-esteem, & coping skills, including anger management, assertiveness communication skills.        Symptoms:   Mood: depressed mood, diminished interest and worthlessness/guilt  Anxiety: excessive anxiety/worry, restlessness/keyed up and irritability  Substance abuse: substance tolerance and take more than intended  Cognitive functioning: denied  Health behaviors: some alcohol binges     Psychiatric history: psychotropic management by PCP & prior suicide attempt(s); no chemical abuse treatment reported but did attend AA for years & had a sponsor; now relapsed with a " "binge pattern of use. Family history of psychiatric illness: father bipolar; sister with clinical anxiety. Social history: See history above; oldest of 3; younger brother drowned as an infant. Younger sister is her closest friend. Chaotic childhood; parents were very young; left pt & younger sister with grandmother when she was 4; father never returned; mother came back after 1 year. Grandmother, an aunt, & uncle were parental figures. Pt described growing social tension in school in middle school & high school, related to her budding identity as a masculine lesbian. Reported running away from school & home several times in teens. Attempted suicide at 17. Discovered bhandari bars at 19 found the start of a sort of community. 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 Current committed relationship of the past 25 years; described partner as controling, domineering, but "she takes care of me." Endorsed tension of pt's past infidelity a decade ago. Previous longest relationship was for 5 years. No biological children of her own. Never in a relationship with a man. Loves animals; finds them easier to relate to than people. Currently lives between 2 houses, one with her partner, the other with her mother.       Substance use:   Alcohol: binge pattern of abuse; denied any known health damage from alcohol   Drugs: none   Tobacco: none   Caffeine: average of 2 and a half cups of coffee daily    Pt confirms the above history. Reports that she's been anxious her "entire life"; overriding distress & impairment. Past dx'es include depression, bipolar disorder (some impulsive shopping). Meds through Dr. Bee Landon for many years, but stopped seeing Dr. Zhong's in summer 2019. Found treatment unaffordable. Off medicine for 3-4 months. "had some left but thought it wasn't working, it makes me gain weight, & I'm sluggish". Thinks that these things improved off the medication.     "got an " "ultimatum" to get back onto medication by her partner who patient says has observed that patient is less irritable & agitated on medication (but she doesn't see this herself).     Endorses "yes" to most items on MDQ    Psych Hx: "anger issues" since a kid. "fought a lot", "usually defending other people", "I couldn't verbally say what I felt, so I thought", though rarely disciplinary problems in school. Protected her sister, friends.     Treated with medication by PCP in  or , dx'ed with depression in - by Dr. Zhong. Diagnosis later changed to bipolar disorder, but she doesn't believe this matches her. She's read about borderline personality & thinks that features of this diagnosis better match her understanding of herself. Was mostly off medication after Dr. Landon left private practice.     Went back to Dr. Landon in  when she became available again, has seen her since & has continued on her current regimen. Was seeing a therapist that works with her, Namita Lopez. They recommended a long-term treatment program in [place] at one point.     Reports mood problems have led her to be passed up for promotions, advancement.     Denies periods of mood elevation that have led to dysfunction or     Family Hx: father   Social Hx: as above. father left at age 6. Wasn't in her life after that, but sought him out at 41, but was disappointed by the relationship because he wanted money from her.   Has a younger sister & younger brother. Brother , drowned in the bathtub.   Lives between two households (between mother's and partner's). Mother is critical.   Master's degree. Works for the [employer].   No arrests.     Review Of Systems:     GENERAL:  No weight gain or loss  SKIN:  No rashes or lacerations  HEAD:  No headaches  CHEST:  No shortness of breath, hyperventilation or cough  CARDIOVASCULAR:  No tachycardia or chest pain  ABDOMEN:  No nausea, vomiting, pain, constipation or diarrhea  URINARY:  No " frequency, dysuria or sexual dysfunction  ENDOCRINE:  No polydipsia, polyuria  MUSCULOSKELETAL:  No pain or stiffness of the joints  NEUROLOGIC:  No weakness, sensory changes, seizures, confusion, memory loss, tremor or other abnormal movements    Current Evaluation:     Nutritional Screening: Considering the patient's height and weight, medications, medical history and preferences, should a referral be made to the dietitian? no    Constitutional  Vitals:  Most recent vital signs, dated less than 90 days prior to this appointment, were reviewed.       General:  unremarkable, age appropriate     Musculoskeletal  Muscle Strength/Tone:  no tremor, no tic   Gait & Station:  non-ataxic     Psychiatric  Appearance: casually dressed & groomed;   Behavior: calm,   Cooperation: cooperative with assessment  Speech: normal rate, volume, tone  Thought Process: linear, goal-directed  Thought Content: No suicidal or homicidal ideation; no delusions  Affect: anxious  Mood: anxious  Perceptions: No auditory or visual hallucinations  Level of Consciousness: alert throughout interview  Insight: fair  Cognition: Oriented to person, place, time, & situation  Memory: no apparent deficits to general clinical interview; not formally assessed  Attention/Concentration: no apparent deficits to general clinical interview; not formally assessed  Fund of Knowledge: average by vocabulary/education    Laboratory Data  No visits with results within 1 Month(s) from this visit.   Latest known visit with results is:   Lab Visit on 01/31/2022   Component Date Value Ref Range Status    Sodium 01/31/2022 141  136 - 145 mmol/L Final    Potassium 01/31/2022 4.7  3.5 - 5.1 mmol/L Final    Chloride 01/31/2022 103  95 - 110 mmol/L Final    CO2 01/31/2022 30 (A) 23 - 29 mmol/L Final    Glucose 01/31/2022 91  70 - 110 mg/dL Final    BUN 01/31/2022 9  8 - 23 mg/dL Final    Creatinine 01/31/2022 0.9  0.5 - 1.4 mg/dL Final    Calcium 01/31/2022 9.8  8.7 - 10.5  mg/dL Final    Total Protein 01/31/2022 8.1  6.0 - 8.4 g/dL Final    Albumin 01/31/2022 3.6  3.5 - 5.2 g/dL Final    Total Bilirubin 01/31/2022 0.5  0.1 - 1.0 mg/dL Final    Alkaline Phosphatase 01/31/2022 115  55 - 135 U/L Final    AST 01/31/2022 18  10 - 40 U/L Final    ALT 01/31/2022 14  10 - 44 U/L Final    Anion Gap 01/31/2022 8  8 - 16 mmol/L Final    eGFR if African American 01/31/2022 >60.0  >60 mL/min/1.73 m^2 Final    eGFR if non African American 01/31/2022 >60.0  >60 mL/min/1.73 m^2 Final    WBC 01/31/2022 6.97  3.90 - 12.70 K/uL Final    RBC 01/31/2022 4.86  4.00 - 5.40 M/uL Final    Hemoglobin 01/31/2022 12.9  12.0 - 16.0 g/dL Final    Hematocrit 01/31/2022 43.1  37.0 - 48.5 % Final    MCV 01/31/2022 89  82 - 98 fL Final    MCH 01/31/2022 26.5 (A) 27.0 - 31.0 pg Final    MCHC 01/31/2022 29.9 (A) 32.0 - 36.0 g/dL Final    RDW 01/31/2022 14.3  11.5 - 14.5 % Final    Platelets 01/31/2022 425  150 - 450 K/uL Final    MPV 01/31/2022 10.0  9.2 - 12.9 fL Final    Immature Granulocytes 01/31/2022 0.1  0.0 - 0.5 % Final    Gran # (ANC) 01/31/2022 4.1  1.8 - 7.7 K/uL Final    Immature Grans (Abs) 01/31/2022 0.01  0.00 - 0.04 K/uL Final    Lymph # 01/31/2022 2.0  1.0 - 4.8 K/uL Final    Mono # 01/31/2022 0.7  0.3 - 1.0 K/uL Final    Eos # 01/31/2022 0.2  0.0 - 0.5 K/uL Final    Baso # 01/31/2022 0.08  0.00 - 0.20 K/uL Final    nRBC 01/31/2022 0  0 /100 WBC Final    Gran % 01/31/2022 58.8  38.0 - 73.0 % Final    Lymph % 01/31/2022 28.0  18.0 - 48.0 % Final    Mono % 01/31/2022 9.6  4.0 - 15.0 % Final    Eosinophil % 01/31/2022 2.4  0.0 - 8.0 % Final    Basophil % 01/31/2022 1.1  0.0 - 1.9 % Final    Differential Method 01/31/2022 Automated   Final    Cholesterol 01/31/2022 202 (A) 120 - 199 mg/dL Final    Triglycerides 01/31/2022 120  30 - 150 mg/dL Final    HDL 01/31/2022 50  40 - 75 mg/dL Final    LDL Cholesterol 01/31/2022 128.0  63.0 - 159.0 mg/dL Final    HDL/Cholesterol Ratio 01/31/2022 24.8  20.0 - 50.0 %  Final    Total Cholesterol/HDL Ratio 01/31/2022 4.0  2.0 - 5.0 Final    Non-HDL Cholesterol 01/31/2022 152  mg/dL Final    TSH 01/31/2022 2.290  0.400 - 4.000 uIU/mL Final     Medications  Outpatient Encounter Medications as of 8/23/2022   Medication Sig Dispense Refill    busPIRone (BUSPAR) 30 MG Tab TAKE 1 TABLET(30 MG) BY MOUTH TWICE DAILY 180 tablet 0    clotrimazole-betamethasone 1-0.05% (LOTRISONE) cream Apply topically 2 (two) times daily. 1 each 1    fluconazole (DIFLUCAN) 100 MG tablet Take 1 tablet (100 mg total) by mouth once daily. 10 tablet 0    ibuprofen (ADVIL,MOTRIN) 800 MG tablet Take 1 tablet (800 mg total) by mouth 3 (three) times daily as needed. 60 tablet 0    lamoTRIgine (LAMICTAL) 150 MG Tab Take 1 tablet (150 mg total) by mouth once daily. 30 tablet 2    OXcarbazepine (TRILEPTAL) 150 MG Tab Take 1 tablet (150 mg total) by mouth 2 (two) times daily. 60 tablet 2    pantoprazole (PROTONIX) 40 MG tablet TAKE 1 TABLET BY MOUTH EVERY MORNING 30 MINUTES BEFORE EATING MEAL 30 tablet 11    temazepam (RESTORIL) 7.5 MG Cap Take 2 to 3 at bedtime as needed for sleep. 90 capsule 2    valACYclovir (VALTREX) 1000 MG tablet Take 2 tablets (2,000 mg total) by mouth every 12 (twelve) hours. 4 tablet 0     No facility-administered encounter medications on file as of 8/23/2022.     Assessment - Diagnosis - Goals:     Impression: Patient is a 63 y/o F with chronic bipolar disorder, with active major depressive episode despite adherence with medication; has hx of remote substance use disorders. Some progress on insomnia.    Dx: bipolar I disorder, mre depressed; anxiety    Treatment Goals:  Specify outcomes written in observable, behavioral terms: prevent recurrences.    Treatment Plan/Recommendations:   Retry oxcarbazepine, buspirone for anxiety. Temazepam to 22.5 mg qhs prn sleep.  Psychotherapy today for coping with scrupulosity.   Discussed risks, benefits, and alternatives to treatment plan documented above  with patient. I answered all patient questions related to this plan and patient expressed understanding and agreement.     Return to Clinic: 4 months    CLINT Soriano MD  Psychiatry  [facility]  [address] , [place] [address] [phone]

## 2022-08-29 ENCOUNTER — OFFICE VISIT (OUTPATIENT)
Dept: PSYCHIATRY | Facility: CLINIC | Age: 62
End: 2022-08-29
Payer: COMMERCIAL

## 2022-08-29 DIAGNOSIS — Z63.9 FAMILY DYNAMICS PROBLEM: ICD-10-CM

## 2022-08-29 DIAGNOSIS — G47.00 INSOMNIA, UNSPECIFIED TYPE: ICD-10-CM

## 2022-08-29 DIAGNOSIS — F31.30 BIPOLAR DISORDER, MOST RECENT EPISODE DEPRESSED: Primary | ICD-10-CM

## 2022-08-29 DIAGNOSIS — F41.1 GAD (GENERALIZED ANXIETY DISORDER): ICD-10-CM

## 2022-08-29 PROCEDURE — 90834 PR PSYCHOTHERAPY W/PATIENT, 45 MIN: ICD-10-PCS | Mod: S$GLB,,, | Performed by: SOCIAL WORKER

## 2022-08-29 PROCEDURE — 99999 PR PBB SHADOW E&M-EST. PATIENT-LVL I: ICD-10-PCS | Mod: PBBFAC,,, | Performed by: SOCIAL WORKER

## 2022-08-29 PROCEDURE — 90834 PSYTX W PT 45 MINUTES: CPT | Mod: S$GLB,,, | Performed by: SOCIAL WORKER

## 2022-08-29 PROCEDURE — 99999 PR PBB SHADOW E&M-EST. PATIENT-LVL I: CPT | Mod: PBBFAC,,, | Performed by: SOCIAL WORKER

## 2022-08-29 SDOH — SOCIAL DETERMINANTS OF HEALTH (SDOH): PROBLEM RELATED TO PRIMARY SUPPORT GROUP, UNSPECIFIED: Z63.9

## 2022-08-29 NOTE — PROGRESS NOTES
"Individual Psychotherapy (PhD/LCSW)    5/3/21    Site:  [facility]             Therapeutic Intervention: Met with patient in clinic.  Met with patient. Outpatient - Insight oriented psychotherapy 45 min - CPT code 24053 and Outpatient - Supportive psychotherapy 45 min - CPT Code 25852    Chief complaint/reason for encounter: depression, mood swings, anxiety, grief and loss, and interpersonal     Interval history and content of current session:   62 year old female patient following up in clinic to address biplar depression and anxiety. Previously seen 8/19/22, by virtual visit.  The patient presented to clinic with fairly calm affect, casually dressed, appropriately groomed, stating she remains focused on intense desire to prevent animal cruelty.  She said, however, she is feeling somewhat less obsessed, less frantic to convince everyone around her to change their ways.  She reflected on a medication management appointment she attended the previous week, saying she has been back and forth with medication, now back on the recently started, and recently paused, Trileptal.  She talked about her workplace experience.  Talked about a friend at work, Deja, who has been a good support for her and a calming influence.  She said he told her to focus on "managing expectations."  She appreciated that advice.  Discussed with patient ways to increase personal positive impact, to seek to feel less dependent on others' choices for one's own sense of wellbeing or sense of being well-grounded.  Patient did not reject this idea out of hand.  Patient denied any si/hi, psychosis, cognitive deficits, or substance abuse. Supportive therapy provided.  Plan is to follow up for psychotherapy 9/12/22.          Treatment plan:  Target symptoms: depression, anxiety , substance abuse, mood swings, grief, relationship tension  Why chosen therapy is appropriate versus another modality: relevant to diagnosis, patient responds to this " modality  Outcome monitoring methods: self-report, observation  Therapeutic intervention type: insight oriented psychotherapy, supportive psychotherapy    Risk parameters:  Patient reports no suicidal ideation  Patient reports no homicidal ideation  Patient reports no self-injurious behavior  Patient reports no violent behavior    Verbal deficits: None    Patient's response to intervention:  The patient's response to intervention is accepting.    Progress toward goals and other mental status changes:  The patient's progress toward goals is mixed    Diagnosis:     ICD-10-CM ICD-9-CM   1. Bipolar disorder, most recent episode depressed  F31.30 296.50   2. ERNESTINE (generalized anxiety disorder)  F41.1 300.02   3. Family dynamics problem  Z63.9 V61.9   4. Insomnia, unspecified type  G47.00 780.52   History of Alcohol use disorder     Plan:  individual psychotherapy and medication management by physician    Return to clinic: as scheduled    Length of Service (minutes): 45

## 2022-09-12 ENCOUNTER — OFFICE VISIT (OUTPATIENT)
Dept: PSYCHIATRY | Facility: CLINIC | Age: 62
End: 2022-09-12
Payer: COMMERCIAL

## 2022-09-12 DIAGNOSIS — F41.1 GAD (GENERALIZED ANXIETY DISORDER): ICD-10-CM

## 2022-09-12 DIAGNOSIS — G47.00 INSOMNIA, UNSPECIFIED TYPE: ICD-10-CM

## 2022-09-12 DIAGNOSIS — Z63.9 FAMILY DYNAMICS PROBLEM: ICD-10-CM

## 2022-09-12 DIAGNOSIS — F31.30 BIPOLAR I DISORDER, MOST RECENT EPISODE (OR CURRENT) DEPRESSED: Primary | ICD-10-CM

## 2022-09-12 PROCEDURE — 99999 PR PBB SHADOW E&M-EST. PATIENT-LVL I: CPT | Mod: PBBFAC,,, | Performed by: SOCIAL WORKER

## 2022-09-12 PROCEDURE — 90834 PSYTX W PT 45 MINUTES: CPT | Mod: S$GLB,,, | Performed by: SOCIAL WORKER

## 2022-09-12 PROCEDURE — 90834 PR PSYCHOTHERAPY W/PATIENT, 45 MIN: ICD-10-PCS | Mod: S$GLB,,, | Performed by: SOCIAL WORKER

## 2022-09-12 PROCEDURE — 99999 PR PBB SHADOW E&M-EST. PATIENT-LVL I: ICD-10-PCS | Mod: PBBFAC,,, | Performed by: SOCIAL WORKER

## 2022-09-12 SDOH — SOCIAL DETERMINANTS OF HEALTH (SDOH): PROBLEM RELATED TO PRIMARY SUPPORT GROUP, UNSPECIFIED: Z63.9

## 2022-09-12 NOTE — PROGRESS NOTES
Individual Psychotherapy (PhD/LCSW)    5/3/21    Site:  [facility]             Therapeutic Intervention: Met with patient in clinic.  Met with patient. Outpatient - Insight oriented psychotherapy 45 min - CPT code 18102 and Outpatient - Supportive psychotherapy 45 min - CPT Code 29120    Chief complaint/reason for encounter: depression, mood swings, anxiety, grief and loss, and interpersonal     Interval history and content of current session:   62 year old female patient returned to clinic for follow up of psychotherapy for bipolar depression and generalized anxiety.  The patient arrived to clinic casually dressed.  She was emotionally labile throughout the session, repeatedly tearful and expressing frustration at that fact.  Stating she has been frustrated with her medication.  Stated she is taking the new medication as directed but questions if it is helping.  Patient reported averaging about 6 hours of sleep each night, then added she said her energy level feels okay.  Patient endorsed continued obsessive worry, particularly regarding animal cruelty in the work and self-conscious worry about how and to what extent she can do anything constructive about it.  Discussed some RET principles and practice options for challenging her own negative self-talk.  Discussed permision to not have all the answers to problems she identifies. Patient denied any si/hi, psychosis, cognitive deficits, or substance abuse. Supportive therapy provided.  Plan is to follow up for psychotherapy in two weeks, as scheduled.         Treatment plan:  Target symptoms: depression, anxiety , substance abuse, mood swings, grief, relationship tension  Why chosen therapy is appropriate versus another modality: relevant to diagnosis, patient responds to this modality  Outcome monitoring methods: self-report, observation  Therapeutic intervention type: insight oriented psychotherapy, supportive psychotherapy    Risk parameters:  Patient reports no  suicidal ideation  Patient reports no homicidal ideation  Patient reports no self-injurious behavior  Patient reports no violent behavior    Verbal deficits: None    Patient's response to intervention:  The patient's response to intervention is accepting.    Progress toward goals and other mental status changes:  The patient's progress toward goals is mixed    Diagnosis:     ICD-10-CM ICD-9-CM   1. Bipolar I disorder, most recent episode (or current) depressed  F31.30 296.50   2. ERNESTINE (generalized anxiety disorder)  F41.1 300.02   3. Insomnia, unspecified type  G47.00 780.52   4. Family dynamics problem  Z63.9 V61.9   History of Alcohol use disorder     Plan:  individual psychotherapy and medication management by physician    Return to clinic: as scheduled    Length of Service (minutes): 45

## 2022-09-15 ENCOUNTER — PATIENT MESSAGE (OUTPATIENT)
Dept: PSYCHIATRY | Facility: CLINIC | Age: 62
End: 2022-09-15
Payer: COMMERCIAL

## 2022-09-15 ENCOUNTER — NURSE TRIAGE (OUTPATIENT)
Dept: ADMINISTRATIVE | Facility: CLINIC | Age: 62
End: 2022-09-15
Payer: COMMERCIAL

## 2022-09-15 RX ORDER — LAMOTRIGINE 150 MG/1
150 TABLET ORAL DAILY
Qty: 30 TABLET | Refills: 2 | Status: SHIPPED | OUTPATIENT
Start: 2022-09-15 | End: 2023-01-30 | Stop reason: SDUPTHER

## 2022-09-15 NOTE — TELEPHONE ENCOUNTER
Recent medication change. Pt would like to go to the medicine she was on before Lamotrigine and it was changed to Oxcarbazapine. The new one she is worse than the old one. She is crying everyday. She feels anxious at times. She doesn't know the difference between anxiety and panic attack. Use to drink to dull the pain but hasn't had anything to drink. Hasn't had a drink in a couple of weeks. Care advice recommends pt is seen today. Pt is willing to do a virtual or phone visit. Pt stated if she can not be seen today can she go back to her old medication until her appt next month. Please call pt within next hour per care advice.    Reason for Disposition   Caller has URGENT medicine question about med that PCP or specialist prescribed and triager unable to answer question   Patient sounds very upset or troubled to the triager    Additional Information   Negative: SEVERE difficulty breathing (e.g., struggling for each breath, speaks in single words)   Negative: Bluish (or gray) lips or face   Negative: Difficult to awaken or acting confused (e.g., disoriented, slurred speech)   Negative: Hysterical or combative behavior   Negative: Sounds like a life-threatening emergency to the triager   Negative: Lightheadedness or dizziness and persists > 10 minutes and not relieved by reassurance provided by triager   Negative: Difficulty breathing and persists > 10 minutes and not relieved by reassurance provided by triager   Negative: Substance use (drug use) or unhealthy alcohol use, known or suspected, and feeling very shaky (i.e., visible tremors of hands)   Negative: Patient sounds very sick or weak to the triager    Protocols used: Medication Question Call-A-[place], Anxiety and Panic Attack-A-[place]

## 2022-09-23 ENCOUNTER — OFFICE VISIT (OUTPATIENT)
Dept: PSYCHIATRY | Facility: CLINIC | Age: 62
End: 2022-09-23
Payer: COMMERCIAL

## 2022-09-23 DIAGNOSIS — Z63.9 FAMILY DYNAMICS PROBLEM: ICD-10-CM

## 2022-09-23 DIAGNOSIS — F41.1 GAD (GENERALIZED ANXIETY DISORDER): ICD-10-CM

## 2022-09-23 DIAGNOSIS — F31.62 BIPOLAR MIXED AFFECTIVE DISORDER, MODERATE: Primary | ICD-10-CM

## 2022-09-23 DIAGNOSIS — G47.00 INSOMNIA, UNSPECIFIED TYPE: ICD-10-CM

## 2022-09-23 PROCEDURE — 90834 PSYTX W PT 45 MINUTES: CPT | Mod: 95,,, | Performed by: SOCIAL WORKER

## 2022-09-23 PROCEDURE — 90834 PR PSYCHOTHERAPY W/PATIENT, 45 MIN: ICD-10-PCS | Mod: 95,,, | Performed by: SOCIAL WORKER

## 2022-09-23 SDOH — SOCIAL DETERMINANTS OF HEALTH (SDOH): PROBLEM RELATED TO PRIMARY SUPPORT GROUP, UNSPECIFIED: Z63.9

## 2022-09-23 NOTE — PROGRESS NOTES
"Individual Psychotherapy (PhD/LCSW)    5/3/21    Site:  [place]        --Via virtual visit with synchronous audio and video.  Patient presented at work, in the state of [place].   Each patient to whom medical services by telemedicine is provided is:  (1) informed of the relationship between the physician and patient and the respective role of any other health care provider with respect to management of the patient; and (2) notified that he or she may decline to receive medical services by telemedicine and may withdraw from such care at any time.     Therapeutic Intervention: Met with patient in clinic.  Met with patient. Outpatient - Insight oriented psychotherapy 45 min - CPT code 77017 and Outpatient - Supportive psychotherapy 45 min - CPT Code 98723    Chief complaint/reason for encounter: depression, mood swings, anxiety, grief and loss, and interpersonal     Interval history and content of current session:   62 year old female patient following up for bipolar disorder and family and work stress.  Patient reporting up and down days.  Said prior to this urgent session being scheduled, she was feeling particularly stressed.  Much calmer and less stressed now.  She reported having gone back to her previous medication again, experiencing too unpleasant side effects on the other.  She reported her "[employer]ar[employer] missionary zeal" as she called it, for Church principals and veganism has not seemed to have changed anyone's mind and instead just offends people she talks to.  She said she realizes she cannot guilt people into choosing her way.  She endorsed emotional struggle with thoughts of animals dying.  Stated she originally became angry and her understanding of God for the very fact of massive widespread suffering and death in nature.  Then, when she discovered veganism, she thought she had found the answer to "fix" the injustice.  Discussed the concept of consciously choosing to operate/live her live at a " "human scale, rather than trying to "take on the world," since world wide problems quickly overwhelm an individual.  Patient denied any si/hi, psychosis, cognitive deficits, or substance abuse. Supportive therapy provided.  Plan is to follow up for psychotherapy in two weeks, as scheduled.   Next appointment 10/10/22.      Treatment plan:  Target symptoms: depression, anxiety , substance abuse, mood swings, grief, relationship tension  Why chosen therapy is appropriate versus another modality: relevant to diagnosis, patient responds to this modality  Outcome monitoring methods: self-report, observation  Therapeutic intervention type: insight oriented psychotherapy, supportive psychotherapy    Risk parameters:  Patient reports no suicidal ideation  Patient reports no homicidal ideation  Patient reports no self-injurious behavior  Patient reports no violent behavior    Verbal deficits: None    Patient's response to intervention:  The patient's response to intervention is accepting.    Progress toward goals and other mental status changes:  The patient's progress toward goals is mixed    Diagnosis:   No diagnosis found.  History of Alcohol use disorder     Plan:  individual psychotherapy and medication management by physician    Return to clinic: as scheduled    Length of Service (minutes): 45          "

## 2022-09-26 ENCOUNTER — PATIENT MESSAGE (OUTPATIENT)
Dept: OPHTHALMOLOGY | Facility: CLINIC | Age: 62
End: 2022-09-26

## 2022-09-26 ENCOUNTER — OFFICE VISIT (OUTPATIENT)
Dept: OPHTHALMOLOGY | Facility: CLINIC | Age: 62
End: 2022-09-26
Payer: COMMERCIAL

## 2022-09-26 DIAGNOSIS — Z98.890 HX OF LASIK: ICD-10-CM

## 2022-09-26 DIAGNOSIS — H25.13 CATARACT, NUCLEAR SCLEROTIC SENILE, BILATERAL: Primary | ICD-10-CM

## 2022-09-26 DIAGNOSIS — Z83.518 FAMILY HISTORY OF RETINAL DETACHMENT: ICD-10-CM

## 2022-09-26 DIAGNOSIS — H52.7 REFRACTIVE ERRORS: ICD-10-CM

## 2022-09-26 PROCEDURE — 1159F PR MEDICATION LIST DOCUMENTED IN MEDICAL RECORD: ICD-10-PCS | Mod: CPTII,S$GLB,, | Performed by: OPTOMETRIST

## 2022-09-26 PROCEDURE — 92015 DETERMINE REFRACTIVE STATE: CPT | Mod: S$GLB,,, | Performed by: OPTOMETRIST

## 2022-09-26 PROCEDURE — 99999 PR PBB SHADOW E&M-EST. PATIENT-LVL I: ICD-10-PCS | Mod: PBBFAC,,, | Performed by: OPTOMETRIST

## 2022-09-26 PROCEDURE — 92015 PR REFRACTION: ICD-10-PCS | Mod: S$GLB,,, | Performed by: OPTOMETRIST

## 2022-09-26 PROCEDURE — 92014 COMPRE OPH EXAM EST PT 1/>: CPT | Mod: S$GLB,,, | Performed by: OPTOMETRIST

## 2022-09-26 PROCEDURE — 92014 PR EYE EXAM, EST PATIENT,COMPREHESV: ICD-10-PCS | Mod: S$GLB,,, | Performed by: OPTOMETRIST

## 2022-09-26 PROCEDURE — 99999 PR PBB SHADOW E&M-EST. PATIENT-LVL I: CPT | Mod: PBBFAC,,, | Performed by: OPTOMETRIST

## 2022-09-26 PROCEDURE — 1159F MED LIST DOCD IN RCRD: CPT | Mod: CPTII,S$GLB,, | Performed by: OPTOMETRIST

## 2022-09-26 NOTE — PROGRESS NOTES
HPI    Pt states she can't see computer with PALs,    Cataracts  Hx of LASIK.  Last edited by Gretchen Ellis MA on 9/26/2022  2:22 PM.            Assessment /Plan     For exam results, see Encounter Report.    Cataract, nuclear sclerotic senile, bilateral    Hx of LASIK    Family history of retinal detachment    Refractive errors      Minimal cataracts OU, not surgical    Stable LASIK    Mom had spontaneous RD OS    Dispense Final Rx for glasses.  RTC 1 year  Discussed above and answered questions.

## 2022-10-10 ENCOUNTER — OFFICE VISIT (OUTPATIENT)
Dept: PSYCHIATRY | Facility: CLINIC | Age: 62
End: 2022-10-10
Payer: COMMERCIAL

## 2022-10-10 DIAGNOSIS — F31.62 BIPOLAR MIXED AFFECTIVE DISORDER, MODERATE: Primary | ICD-10-CM

## 2022-10-10 DIAGNOSIS — Z63.9 FAMILY DYNAMICS PROBLEM: ICD-10-CM

## 2022-10-10 DIAGNOSIS — F41.1 GAD (GENERALIZED ANXIETY DISORDER): ICD-10-CM

## 2022-10-10 DIAGNOSIS — G47.00 INSOMNIA, UNSPECIFIED TYPE: ICD-10-CM

## 2022-10-10 PROCEDURE — 99999 PR PBB SHADOW E&M-EST. PATIENT-LVL I: CPT | Mod: PBBFAC,,, | Performed by: SOCIAL WORKER

## 2022-10-10 PROCEDURE — 99999 PR PBB SHADOW E&M-EST. PATIENT-LVL I: ICD-10-PCS | Mod: PBBFAC,,, | Performed by: SOCIAL WORKER

## 2022-10-10 PROCEDURE — 90834 PR PSYCHOTHERAPY W/PATIENT, 45 MIN: ICD-10-PCS | Mod: S$GLB,,, | Performed by: SOCIAL WORKER

## 2022-10-10 PROCEDURE — 90834 PSYTX W PT 45 MINUTES: CPT | Mod: S$GLB,,, | Performed by: SOCIAL WORKER

## 2022-10-10 SDOH — SOCIAL DETERMINANTS OF HEALTH (SDOH): PROBLEM RELATED TO PRIMARY SUPPORT GROUP, UNSPECIFIED: Z63.9

## 2022-10-17 NOTE — PROGRESS NOTES
Individual Psychotherapy (PhD/LCSW)    5/3/21    Site:  [place]        --Via virtual visit with synchronous audio and video.  Patient presented at work, in the state of [place].   Each patient to whom medical services by telemedicine is provided is:  (1) informed of the relationship between the physician and patient and the respective role of any other health care provider with respect to management of the patient; and (2) notified that he or she may decline to receive medical services by telemedicine and may withdraw from such care at any time.     Therapeutic Intervention: Met with patient in clinic.  Met with patient. Outpatient - Insight oriented psychotherapy 45 min - CPT code 66383 and Outpatient - Supportive psychotherapy 45 min - CPT Code 73985    Chief complaint/reason for encounter: depression, mood swings, anxiety, grief and loss, and interpersonal     Interval history and content of current session:   Late entry for 10/10/22.   62 year old female patient returning for follow-up of psychotherapy for bipolar disorder and family and work stress.  The patient reported some family drama over her having co-signed for her nephew to buy a motorcycle a few months ago, which he has proven completely irresponsible for, so far as keeping up on payments.  She had offered him an inherited old but fully functional car, so he would have transportation, but he refused it, wanting a motorcycle instead, though he could have sold the care and bought a motorcycle with it.  She said she later gave him the car and has not seen it since, thinking he sold it for money for drugs.  She said she now has the motorcycle back in her possession but needs him to sign off on reselling it.  Discussed family criticism of her enabling his back behavior.  Patient talked of her continued struggle to emotionally tolerate being around anyone even talking about eating a non-vegan diet.  Her voice became tremulous in session just talking  "about it.  She wore a baseball-style cap to the session with a large embroidered patch "VEGAN" on the front.  She stated she is realizing her approach with people is not resulting in successful conversion of anyone to her way of seeing things.  Also discussed her guilt feelings for her past personal consumption of meet and eggs and dairy.  Patient denied any si/hi, psychosis, cognitive deficits, or substance abuse. Supportive therapy provided.  Plan is to follow up for psychotherapy in two weeks, as scheduled.   Next appointment 10/24/22.      Treatment plan:  Target symptoms: depression, anxiety , substance abuse, mood swings, grief, relationship tension  Why chosen therapy is appropriate versus another modality: relevant to diagnosis, patient responds to this modality  Outcome monitoring methods: self-report, observation  Therapeutic intervention type: insight oriented psychotherapy, supportive psychotherapy    Risk parameters:  Patient reports no suicidal ideation  Patient reports no homicidal ideation  Patient reports no self-injurious behavior  Patient reports no violent behavior    Verbal deficits: None    Patient's response to intervention:  The patient's response to intervention is accepting.    Progress toward goals and other mental status changes:  The patient's progress toward goals is mixed    Diagnosis:     ICD-10-CM ICD-9-CM   1. Bipolar mixed affective disorder, moderate  F31.62 296.62   2. ERNESTINE (generalized anxiety disorder)  F41.1 300.02   3. Insomnia, unspecified type  G47.00 780.52   4. Family dynamics problem  Z63.9 V61.9     History of Alcohol use disorder     Plan:  individual psychotherapy and medication management by physician    Return to clinic: as scheduled    Length of Service (minutes): 45            "

## 2022-10-24 ENCOUNTER — OFFICE VISIT (OUTPATIENT)
Dept: PSYCHIATRY | Facility: CLINIC | Age: 62
End: 2022-10-24
Payer: COMMERCIAL

## 2022-10-24 DIAGNOSIS — G47.00 INSOMNIA, UNSPECIFIED TYPE: ICD-10-CM

## 2022-10-24 DIAGNOSIS — F41.1 GAD (GENERALIZED ANXIETY DISORDER): ICD-10-CM

## 2022-10-24 DIAGNOSIS — F31.62 BIPOLAR MIXED AFFECTIVE DISORDER, MODERATE: Primary | ICD-10-CM

## 2022-10-24 DIAGNOSIS — Z63.9 FAMILY DYNAMICS PROBLEM: ICD-10-CM

## 2022-10-24 PROCEDURE — 90834 PSYTX W PT 45 MINUTES: CPT | Mod: S$GLB,,, | Performed by: SOCIAL WORKER

## 2022-10-24 PROCEDURE — 90834 PR PSYCHOTHERAPY W/PATIENT, 45 MIN: ICD-10-PCS | Mod: S$GLB,,, | Performed by: SOCIAL WORKER

## 2022-10-24 PROCEDURE — 99999 PR PBB SHADOW E&M-EST. PATIENT-LVL I: ICD-10-PCS | Mod: PBBFAC,,, | Performed by: SOCIAL WORKER

## 2022-10-24 PROCEDURE — 99999 PR PBB SHADOW E&M-EST. PATIENT-LVL I: CPT | Mod: PBBFAC,,, | Performed by: SOCIAL WORKER

## 2022-10-24 SDOH — SOCIAL DETERMINANTS OF HEALTH (SDOH): PROBLEM RELATED TO PRIMARY SUPPORT GROUP, UNSPECIFIED: Z63.9

## 2022-10-31 ENCOUNTER — IMMUNIZATION (OUTPATIENT)
Dept: FAMILY MEDICINE | Facility: CLINIC | Age: 62
End: 2022-10-31
Payer: COMMERCIAL

## 2022-10-31 PROCEDURE — 90471 IMMUNIZATION ADMIN: CPT | Mod: S$GLB,,, | Performed by: FAMILY MEDICINE

## 2022-10-31 PROCEDURE — 90686 FLU VACCINE (QUAD) GREATER THAN OR EQUAL TO 3YO PRESERVATIVE FREE IM: ICD-10-PCS | Mod: S$GLB,,, | Performed by: FAMILY MEDICINE

## 2022-10-31 PROCEDURE — 90686 IIV4 VACC NO PRSV 0.5 ML IM: CPT | Mod: S$GLB,,, | Performed by: FAMILY MEDICINE

## 2022-10-31 PROCEDURE — 90471 FLU VACCINE (QUAD) GREATER THAN OR EQUAL TO 3YO PRESERVATIVE FREE IM: ICD-10-PCS | Mod: S$GLB,,, | Performed by: FAMILY MEDICINE

## 2022-11-01 NOTE — PROGRESS NOTES
"Individual Psychotherapy (PhD/LCSW)    5/3/21    Site:  [place]        --Via virtual visit with synchronous audio and video.  Patient presented at work, in the state of [place].   Each patient to whom medical services by telemedicine is provided is:  (1) informed of the relationship between the physician and patient and the respective role of any other health care provider with respect to management of the patient; and (2) notified that he or she may decline to receive medical services by telemedicine and may withdraw from such care at any time.     Therapeutic Intervention: Met with patient in clinic.  Met with patient. Outpatient - Insight oriented psychotherapy 45 min - CPT code 10000 and Outpatient - Supportive psychotherapy 45 min - CPT Code 88836    Chief complaint/reason for encounter: depression, mood swings, anxiety, grief and loss, and interpersonal     Interval history and content of current session:   Late entry for 10/24/22.   62 year old female patient following up in clinic for psychotherapy to address bipolar disorder and family and work stress.  Patient presented with fair affect, reported feeling much improved mood compared to her anxious depression of last session.  More relaxed, less intense.  Patient stated she no longer is trying "to save or change the world."  Still vegan but reported she has realized she cannot make others' choices for them.  Remains motivated for her whole food plant-based diet.  Said she had to laugh at herself for her own inconsistency in losing desire to live at times vs wanting to change the world and be super-healthy.  Discussed recognition of her bipolar mood swings and ideas for reminding herself in moments of intense mood to take her own feelings with a hefty grain of salt. Patient denied any si/hi, psychosis, cognitive deficits, or substance abuse. Supportive therapy provided.  Plan is to follow up for psychotherapy in two weeks, as scheduled.   Next " appointment 11/14/22.      Treatment plan:  Target symptoms: depression, anxiety , substance abuse, mood swings, grief, relationship tension  Why chosen therapy is appropriate versus another modality: relevant to diagnosis, patient responds to this modality  Outcome monitoring methods: self-report, observation  Therapeutic intervention type: insight oriented psychotherapy, supportive psychotherapy    Risk parameters:  Patient reports no suicidal ideation  Patient reports no homicidal ideation  Patient reports no self-injurious behavior  Patient reports no violent behavior    Verbal deficits: None    Patient's response to intervention:  The patient's response to intervention is accepting.    Progress toward goals and other mental status changes:  The patient's progress toward goals is fair    Diagnosis:     ICD-10-CM ICD-9-CM   1. Bipolar mixed affective disorder, moderate  F31.62 296.62   2. ERNESTINE (generalized anxiety disorder)  F41.1 300.02   3. Insomnia, unspecified type  G47.00 780.52   4. Family dynamics problem  Z63.9 V61.9     History of Alcohol use disorder     Plan:  individual psychotherapy and medication management by physician    Return to clinic: as scheduled    Length of Service (minutes): 45

## 2022-11-28 ENCOUNTER — OFFICE VISIT (OUTPATIENT)
Dept: PSYCHIATRY | Facility: CLINIC | Age: 62
End: 2022-11-28
Payer: COMMERCIAL

## 2022-11-28 DIAGNOSIS — F31.62 BIPOLAR MIXED AFFECTIVE DISORDER, MODERATE: Primary | ICD-10-CM

## 2022-11-28 DIAGNOSIS — Z63.9 FAMILY DYNAMICS PROBLEM: ICD-10-CM

## 2022-11-28 DIAGNOSIS — G47.00 INSOMNIA, UNSPECIFIED TYPE: ICD-10-CM

## 2022-11-28 DIAGNOSIS — F41.1 GAD (GENERALIZED ANXIETY DISORDER): ICD-10-CM

## 2022-11-28 PROCEDURE — 99999 PR PBB SHADOW E&M-EST. PATIENT-LVL I: CPT | Mod: PBBFAC,,, | Performed by: SOCIAL WORKER

## 2022-11-28 PROCEDURE — 90834 PR PSYCHOTHERAPY W/PATIENT, 45 MIN: ICD-10-PCS | Mod: S$GLB,,, | Performed by: SOCIAL WORKER

## 2022-11-28 PROCEDURE — 90834 PSYTX W PT 45 MINUTES: CPT | Mod: S$GLB,,, | Performed by: SOCIAL WORKER

## 2022-11-28 PROCEDURE — 99999 PR PBB SHADOW E&M-EST. PATIENT-LVL I: ICD-10-PCS | Mod: PBBFAC,,, | Performed by: SOCIAL WORKER

## 2022-11-28 SDOH — SOCIAL DETERMINANTS OF HEALTH (SDOH): PROBLEM RELATED TO PRIMARY SUPPORT GROUP, UNSPECIFIED: Z63.9

## 2022-12-06 NOTE — PROGRESS NOTES
Individual Psychotherapy (PhD/LCSW)    5/3/21    Site:  [place]        --Via virtual visit with synchronous audio and video.  Patient presented at work, in the state of [place].   Each patient to whom medical services by telemedicine is provided is:  (1) informed of the relationship between the physician and patient and the respective role of any other health care provider with respect to management of the patient; and (2) notified that he or she may decline to receive medical services by telemedicine and may withdraw from such care at any time.     Therapeutic Intervention: Met with patient in clinic.  Met with patient. Outpatient - Insight oriented psychotherapy 45 min - CPT code 93424 and Outpatient - Supportive psychotherapy 45 min - CPT Code 65503    Chief complaint/reason for encounter: depression, mood swings, anxiety, grief and loss, and interpersonal     Interval history and content of current session:   Late entry for 11/28/22.   62 year old female patient returning for psychotherapy for her bipolar disorder and family and work stress.  Patient presented with subdued, quiet affect.  She remained less talkative than per her usual.  Said she is nervous about her daily to-do list and feeling somewhat preoccupied about it.  Said her sister is coming to visit for the day, which the patient normally likes, but she is feelings too busy, plus she is anticipating extra work around the visit, which has her feeling pressure.  Talked about her passion for promoting veganism, something she came to in recent months.  Wore a vegan themed t-shirt to this session.  Said she realized by lecturing everyone she crossed paths with about the declared evils of their dietary practices, she was mostly only achieving isolation from people.  Said she is looking for healthier balance with her commitment, but still feels very strongly about it as a means of rescuing animals from unnecessary death.  Patient denied any si/hi,  psychosis, cognitive deficits, or substance abuse. Supportive therapy provided.  Plan is to follow up for psychotherapy in two weeks, as scheduled.   Next appointment 12/12/22.      Treatment plan:  Target symptoms: depression, anxiety , substance abuse, mood swings, grief, relationship tension  Why chosen therapy is appropriate versus another modality: relevant to diagnosis, patient responds to this modality  Outcome monitoring methods: self-report, observation  Therapeutic intervention type: insight oriented psychotherapy, supportive psychotherapy    Risk parameters:  Patient reports no suicidal ideation  Patient reports no homicidal ideation  Patient reports no self-injurious behavior  Patient reports no violent behavior    Verbal deficits: None    Patient's response to intervention:  The patient's response to intervention is accepting.    Progress toward goals and other mental status changes:  The patient's progress toward goals is fair    Diagnosis:     ICD-10-CM ICD-9-CM   1. Bipolar mixed affective disorder, moderate  F31.62 296.62   2. ERNESTINE (generalized anxiety disorder)  F41.1 300.02   3. Insomnia, unspecified type  G47.00 780.52   4. Family dynamics problem  Z63.9 V61.9     History of Alcohol use disorder     Plan:  individual psychotherapy and medication management by physician    Return to clinic: as scheduled    Length of Service (minutes): 45

## 2022-12-12 ENCOUNTER — OFFICE VISIT (OUTPATIENT)
Dept: PSYCHIATRY | Facility: CLINIC | Age: 62
End: 2022-12-12
Payer: COMMERCIAL

## 2022-12-12 DIAGNOSIS — G47.00 INSOMNIA, UNSPECIFIED TYPE: ICD-10-CM

## 2022-12-12 DIAGNOSIS — Z63.9 FAMILY DYNAMICS PROBLEM: ICD-10-CM

## 2022-12-12 DIAGNOSIS — F41.1 GAD (GENERALIZED ANXIETY DISORDER): ICD-10-CM

## 2022-12-12 DIAGNOSIS — F31.62 BIPOLAR MIXED AFFECTIVE DISORDER, MODERATE: Primary | ICD-10-CM

## 2022-12-12 PROCEDURE — 99999 PR PBB SHADOW E&M-EST. PATIENT-LVL I: ICD-10-PCS | Mod: PBBFAC,,, | Performed by: SOCIAL WORKER

## 2022-12-12 PROCEDURE — 99999 PR PBB SHADOW E&M-EST. PATIENT-LVL I: CPT | Mod: PBBFAC,,, | Performed by: SOCIAL WORKER

## 2022-12-12 PROCEDURE — 90834 PSYTX W PT 45 MINUTES: CPT | Mod: S$GLB,,, | Performed by: SOCIAL WORKER

## 2022-12-12 PROCEDURE — 90834 PR PSYCHOTHERAPY W/PATIENT, 45 MIN: ICD-10-PCS | Mod: S$GLB,,, | Performed by: SOCIAL WORKER

## 2022-12-12 SDOH — SOCIAL DETERMINANTS OF HEALTH (SDOH): PROBLEM RELATED TO PRIMARY SUPPORT GROUP, UNSPECIFIED: Z63.9

## 2022-12-12 NOTE — PROGRESS NOTES
Individual Psychotherapy (PhD/LCSW)    5/3/21    Site:  [place]           Therapeutic Intervention: Met with patient in clinic.  Met with patient. Outpatient - Insight oriented psychotherapy 45 min - CPT code 97944 and Outpatient - Supportive psychotherapy 45 min - CPT Code 61277    Chief complaint/reason for encounter: depression, mood swings, anxiety, grief and loss, and interpersonal     Interval history and content of current session:    62 year old female patient returning for psychotherapy for her bipolar disorder and family and work stress. The patient included her sister Patsy in the session via a telephone call to the sister, put on speaker phone.  The patient was in-clinic.  They discussed upcoming holiday plans, the sister having invited the patient to join family at the sister's home but also concerned at how the patient may respond to the presence of meat and other animal products at the holiday table.  The patient, for her part, stated her acceptance of the fact that it will not be a vegan occasion.  They then talked about sister's questions as to what has caused the patient to become in the just the past several months quite militant about her vegan ideals.  The patient attempted to explain and also stated she has mellowed most recently, in recent weeks realizing a strident approach accomplishes nothing other than to alienate people.  She continues to become emotional just talking about animals being harmed.  Discussed with the patient the importance of emotional self-care to find some strategy for being able to handle awareness of the reality of life, which includes suffering and includes death, that her values to not have to change, but her expectations leave her vulnerable to crushing emotions over things she has absolutely no control over.    Patient denied any si/hi, psychosis, cognitive deficits, or substance abuse. Supportive therapy provided.  Plan is to follow up for psychotherapy in  two weeks, as scheduled.   Next appointment 12/27/22.      Treatment plan:  Target symptoms: depression, anxiety , substance abuse, mood swings, grief, relationship tension  Why chosen therapy is appropriate versus another modality: relevant to diagnosis, patient responds to this modality  Outcome monitoring methods: self-report, observation  Therapeutic intervention type: insight oriented psychotherapy, supportive psychotherapy    Risk parameters:  Patient reports no suicidal ideation  Patient reports no homicidal ideation  Patient reports no self-injurious behavior  Patient reports no violent behavior    Verbal deficits: None    Patient's response to intervention:  The patient's response to intervention is accepting.    Progress toward goals and other mental status changes:  The patient's progress toward goals is mixed    Diagnosis:     ICD-10-CM ICD-9-CM   1. Bipolar mixed affective disorder, moderate  F31.62 296.62   2. ERNESTINE (generalized anxiety disorder)  F41.1 300.02   3. Insomnia, unspecified type  G47.00 780.52   4. Family dynamics problem  Z63.9 V61.9     History of Alcohol use disorder     Plan:  individual psychotherapy and medication management by physician    Return to clinic: as scheduled    Length of Service (minutes): 45

## 2022-12-14 PROBLEM — Z12.11 SCREENING FOR MALIGNANT NEOPLASM OF COLON: Status: RESOLVED | Noted: 2022-04-04 | Resolved: 2022-12-14

## 2022-12-27 ENCOUNTER — PATIENT MESSAGE (OUTPATIENT)
Dept: PSYCHIATRY | Facility: CLINIC | Age: 62
End: 2022-12-27
Payer: COMMERCIAL

## 2023-01-09 ENCOUNTER — PATIENT MESSAGE (OUTPATIENT)
Dept: PSYCHIATRY | Facility: CLINIC | Age: 63
End: 2023-01-09
Payer: COMMERCIAL

## 2023-01-30 ENCOUNTER — OFFICE VISIT (OUTPATIENT)
Dept: PSYCHIATRY | Facility: CLINIC | Age: 63
End: 2023-01-30
Payer: COMMERCIAL

## 2023-01-30 DIAGNOSIS — F41.1 GAD (GENERALIZED ANXIETY DISORDER): ICD-10-CM

## 2023-01-30 DIAGNOSIS — F31.30 BIPOLAR DISORDER, MOST RECENT EPISODE DEPRESSED: Primary | ICD-10-CM

## 2023-01-30 DIAGNOSIS — G47.00 INSOMNIA, UNSPECIFIED TYPE: ICD-10-CM

## 2023-01-30 PROCEDURE — 99214 OFFICE O/P EST MOD 30 MIN: CPT | Mod: 95,,, | Performed by: PSYCHIATRY & NEUROLOGY

## 2023-01-30 PROCEDURE — 99214 PR OFFICE/OUTPT VISIT, EST, LEVL IV, 30-39 MIN: ICD-10-PCS | Mod: 95,,, | Performed by: PSYCHIATRY & NEUROLOGY

## 2023-01-30 RX ORDER — BUSPIRONE HYDROCHLORIDE 30 MG/1
TABLET ORAL
Qty: 180 TABLET | Refills: 0 | Status: SHIPPED | OUTPATIENT
Start: 2023-01-30 | End: 2023-05-01 | Stop reason: SDUPTHER

## 2023-01-30 RX ORDER — TEMAZEPAM 15 MG/1
CAPSULE ORAL
Qty: 60 CAPSULE | Refills: 2 | Status: SHIPPED | OUTPATIENT
Start: 2023-01-30 | End: 2023-04-19 | Stop reason: SDUPTHER

## 2023-01-30 RX ORDER — FLUVOXAMINE MALEATE 50 MG/1
50 TABLET ORAL NIGHTLY
Qty: 30 TABLET | Refills: 2 | Status: SHIPPED | OUTPATIENT
Start: 2023-01-30 | End: 2023-05-01 | Stop reason: SDUPTHER

## 2023-01-30 RX ORDER — LAMOTRIGINE 150 MG/1
150 TABLET ORAL DAILY
Qty: 30 TABLET | Refills: 2 | Status: SHIPPED | OUTPATIENT
Start: 2023-01-30 | End: 2023-05-01 | Stop reason: SDUPTHER

## 2023-01-30 NOTE — PROGRESS NOTES
"Outpatient Psychiatry Follow-up Visit (MD/NP)    2023    June Stevens, a 63 y.o. female, presenting for follow-up visit. Met with patient.    Reason for Encounter: Chief complaint/reason for encounter: addictive disorder, depression, mood swings, anxiety, behavior and interpersonal    Interval Hx: Patient seen and interviewed for follow-up, about five months since last visit. This was a VIDEO VISIT. She was at home. Describes ongoing mood symptoms, somewhat attenuated from previously. Reports problems with sleep are ongoing - insomnia despite adherence with 22.5 mg temazepam. Middle insomnia. Continues to participate in psychotherapy with David Costa. No side effects. Denies new mental health symptoms, new health conditions.     Background: Pt is a 58 y/o F who presents for establishment of psychiatric care, previously evaluated by Mr. Junito Costa. From his note: reporting chief complaint of "my partner thought since I am getting medication, I ought probably to have some talk therapy as well." Pt described a history of mood swings & recurrent depression going back to her teenage years, a recurrent struggle with anger & angry outbursts, & poor coping skills, relying too much on binge drinking (less than weekly, but always to a point of heavy intoxication) to blot out anger & anxiety. She described a fragile self-esteem, pattern of defensiveness, heightened irritability, & frequent depressive cycles. She believes she has abandonment issues from her parents having left for [place] abruptly when she was just 4 years old, leaving her & her younger sister with a grandmother. Mom returned alone after 1 year but never fully embodied a parental figure to the pt. Her grandmother, who represented stability,  when the pt was 27. She recalls mood issues going back to Jr High age, age 12 or 13. She said that was when social issues at school started to become more difficult as she & those around her started " "noticing she wasn't conforming to stereotypical feminine norms & came to be suspected of being a lesbian. She described an emotional struggle between her Anabaptist beliefs & growing awareness of her same-sex orientation. She said she tried to date boys. She reported a lot of peer rejection, being known as "a alcides lesbian," & she attempted suicide at age 17, stepping out in front of an oncoming car. By age 19 she had been introduced to the bhandari bar scene, which she experienced as a crucial affirmation that she isn't alone, but which met with her mother's disapproval; it took her mother years to become more accepting.  She described her current living situation as a split between 2 households, 1 with her partner of 25 years, who is older & "very controlling," & the other with the pt's mother. She reports tension in her primary relationship that involves infidelity on her part back in 2009, an affair stemming from a high school reunion. She said her partner now polices her, & she feels resentful but also doesn't want to leave her partner over it. The pt denied any suicidal or homicidal ideation, though endorsing remote history of a suicidal episode as a teenager.  Denied any cognitive deficit, psychosis, mood swings, or recreational drug use. Endorsed past struggle with some alcohol abuse, a period of abstinence with AA participation, & now periodic binges. Endorses struggle with explosive anger. Identified therapeutic goals include reduction of depression & anxiety, improved self-esteem, & coping skills, including anger management, assertiveness communication skills.        Symptoms:   Mood: depressed mood, diminished interest and worthlessness/guilt  Anxiety: excessive anxiety/worry, restlessness/keyed up and irritability  Substance abuse: substance tolerance and take more than intended  Cognitive functioning: denied  Health behaviors: some alcohol binges     Psychiatric history: psychotropic management by PCP & " "prior suicide attempt(s); no chemical abuse treatment reported but did attend  for years & had a sponsor; now relapsed with a binge pattern of use. Family history of psychiatric illness: suspects father bipolar (never diagnosed); sister with clinical anxiety. Social history: See history above; oldest of 3; younger brother drowned as an infant. Younger sister is her closest friend. Chaotic childhood; parents were very young; left pt & younger sister with grandmother when she was 4; father never returned; mother came back after 1 year. Grandmother, an aunt, & uncle were parental figures. Pt described growing social tension in school in middle school & high school, related to her budding identity as a masculine lesbian. Reported running away from school & home several times in teens. Attempted suicide at 17. Discovered bhandari bars at 19 found the start of a sort of community. 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 Current committed relationship of the past 25 years; described partner as controling, domineering, but "she takes care of me." Endorsed tension of pt's past infidelity a decade ago. Previous longest relationship was for 5 years. No biological children of her own. Never in a relationship with a man. Loves animals; finds them easier to relate to than people. Currently lives between 2 houses, one with her partner, the other with her mother.       Substance use:   Alcohol: binge pattern of abuse; denied any known health damage from alcohol   Drugs: none   Tobacco: none   Caffeine: average of 2 and a half cups of coffee daily    Pt confirms the above history. Reports that she's been anxious her "entire life"; overriding distress & impairment. Past dx'es include depression, bipolar disorder (some impulsive shopping). Meds through Dr. Bee Landon for many years, but stopped seeing Dr. Zhong's in summer 2019. Found treatment unaffordable. Off medicine for 3-4 months. "had " "some left but thought it wasn't working, it makes me gain weight, & I'm sluggish". Thinks that these things improved off the medication.     "got an ultimatum" to get back onto medication by her partner who patient says has observed that patient is less irritable & agitated on medication (but she doesn't see this herself).     Endorses "yes" to most items on MDQ    Psych Hx: "anger issues" since a kid. "fought a lot", "usually defending other people", "I couldn't verbally say what I felt, so I thought", though rarely disciplinary problems in school. Protected her sister, friends.     Treated with medication by PCP in  or , dx'ed with depression in - by Dr. Zhong. Diagnosis later changed to bipolar disorder, but she doesn't believe this matches her. She's read about borderline personality & thinks that features of this diagnosis better match her understanding of herself. Was mostly off medication after Dr. Landon left private practice.     Went back to Dr. Landon in  when she became available again, has seen her since & has continued on her current regimen. Was seeing a therapist that works with her, Namita Lopez. They recommended a long-term treatment program in [place] at one point.     Reports mood problems have led her to be passed up for promotions, advancement.     Denies periods of mood elevation that have led to dysfunction or     Family Hx: father   Social Hx: as above. father left at age 6. Wasn't in her life after that, but sought him out at 41, but was disappointed by the relationship because he wanted money from her.   Has a younger sister & younger brother. Brother , drowned in the bathtub.   Lives between two households (between mother's and partner's). Mother is critical.   Master's degree. Works for the state.   No arrests.     Review Of Systems:     GENERAL:  No weight gain or loss  SKIN:  No rashes or lacerations  HEAD:  No headaches  CHEST:  No shortness of breath, " hyperventilation or cough  CARDIOVASCULAR:  No tachycardia or chest pain  ABDOMEN:  No nausea, vomiting, pain, constipation or diarrhea  URINARY:  No frequency, dysuria or sexual dysfunction  ENDOCRINE:  No polydipsia, polyuria  MUSCULOSKELETAL:  No pain or stiffness of the joints  NEUROLOGIC:  No weakness, sensory changes, seizures, confusion, memory loss, tremor or other abnormal movements    Current Evaluation:     Nutritional Screening: Considering the patient's height and weight, medications, medical history and preferences, should a referral be made to the dietitian? no    Constitutional  Vitals:  Most recent vital signs, dated less than 90 days prior to this appointment, were reviewed.       General:  unremarkable, age appropriate     Musculoskeletal  Muscle Strength/Tone:  no tremor, no tic   Gait & Station:  non-ataxic     Psychiatric  Appearance: casually dressed & groomed;   Behavior: calm,   Cooperation: cooperative with assessment  Speech: normal rate, volume, tone  Thought Process: linear, goal-directed  Thought Content: No suicidal or homicidal ideation; no delusions  Affect: anxious  Mood: anxious  Perceptions: No auditory or visual hallucinations  Level of Consciousness: alert throughout interview  Insight: fair  Cognition: Oriented to person, place, time, & situation  Memory: no apparent deficits to general clinical interview; not formally assessed  Attention/Concentration: no apparent deficits to general clinical interview; not formally assessed  Fund of Knowledge: average by vocabulary/education    Laboratory Data  No visits with results within 1 Month(s) from this visit.   Latest known visit with results is:   Lab Visit on 01/31/2022   Component Date Value Ref Range Status    Sodium 01/31/2022 141  136 - 145 mmol/L Final    Potassium 01/31/2022 4.7  3.5 - 5.1 mmol/L Final    Chloride 01/31/2022 103  95 - 110 mmol/L Final    CO2 01/31/2022 30 (H)  23 - 29 mmol/L Final    Glucose 01/31/2022 91  70  - 110 mg/dL Final    BUN 01/31/2022 9  8 - 23 mg/dL Final    Creatinine 01/31/2022 0.9  0.5 - 1.4 mg/dL Final    Calcium 01/31/2022 9.8  8.7 - 10.5 mg/dL Final    Total Protein 01/31/2022 8.1  6.0 - 8.4 g/dL Final    Albumin 01/31/2022 3.6  3.5 - 5.2 g/dL Final    Total Bilirubin 01/31/2022 0.5  0.1 - 1.0 mg/dL Final    Alkaline Phosphatase 01/31/2022 115  55 - 135 U/L Final    AST 01/31/2022 18  10 - 40 U/L Final    ALT 01/31/2022 14  10 - 44 U/L Final    Anion Gap 01/31/2022 8  8 - 16 mmol/L Final    eGFR if African American 01/31/2022 >60.0  >60 mL/min/1.73 m^2 Final    eGFR if non African American 01/31/2022 >60.0  >60 mL/min/1.73 m^2 Final    WBC 01/31/2022 6.97  3.90 - 12.70 K/uL Final    RBC 01/31/2022 4.86  4.00 - 5.40 M/uL Final    Hemoglobin 01/31/2022 12.9  12.0 - 16.0 g/dL Final    Hematocrit 01/31/2022 43.1  37.0 - 48.5 % Final    MCV 01/31/2022 89  82 - 98 fL Final    MCH 01/31/2022 26.5 (L)  27.0 - 31.0 pg Final    MCHC 01/31/2022 29.9 (L)  32.0 - 36.0 g/dL Final    RDW 01/31/2022 14.3  11.5 - 14.5 % Final    Platelets 01/31/2022 425  150 - 450 K/uL Final    MPV 01/31/2022 10.0  9.2 - 12.9 fL Final    Immature Granulocytes 01/31/2022 0.1  0.0 - 0.5 % Final    Gran # (ANC) 01/31/2022 4.1  1.8 - 7.7 K/uL Final    Immature Grans (Abs) 01/31/2022 0.01  0.00 - 0.04 K/uL Final    Lymph # 01/31/2022 2.0  1.0 - 4.8 K/uL Final    Mono # 01/31/2022 0.7  0.3 - 1.0 K/uL Final    Eos # 01/31/2022 0.2  0.0 - 0.5 K/uL Final    Baso # 01/31/2022 0.08  0.00 - 0.20 K/uL Final    nRBC 01/31/2022 0  0 /100 WBC Final    Gran % 01/31/2022 58.8  38.0 - 73.0 % Final    Lymph % 01/31/2022 28.0  18.0 - 48.0 % Final    Mono % 01/31/2022 9.6  4.0 - 15.0 % Final    Eosinophil % 01/31/2022 2.4  0.0 - 8.0 % Final    Basophil % 01/31/2022 1.1  0.0 - 1.9 % Final    Differential Method 01/31/2022 Automated   Final    Cholesterol 01/31/2022 202 (H)  120 - 199 mg/dL Final    Triglycerides 01/31/2022 120  30 - 150 mg/dL Final    HDL  01/31/2022 50  40 - 75 mg/dL Final    LDL Cholesterol 01/31/2022 128.0  63.0 - 159.0 mg/dL Final    HDL/Cholesterol Ratio 01/31/2022 24.8  20.0 - 50.0 % Final    Total Cholesterol/HDL Ratio 01/31/2022 4.0  2.0 - 5.0 Final    Non-HDL Cholesterol 01/31/2022 152  mg/dL Final    TSH 01/31/2022 2.290  0.400 - 4.000 uIU/mL Final     Medications  Outpatient Encounter Medications as of 1/30/2023   Medication Sig Dispense Refill    busPIRone (BUSPAR) 30 MG Tab TAKE 1 TABLET(30 MG) BY MOUTH TWICE DAILY 180 tablet 0    clotrimazole-betamethasone 1-0.05% (LOTRISONE) cream Apply topically 2 (two) times daily. (Patient not taking: Reported on 9/26/2022) 1 each 1    fluconazole (DIFLUCAN) 100 MG tablet Take 1 tablet (100 mg total) by mouth once daily. (Patient not taking: Reported on 9/26/2022) 10 tablet 0    ibuprofen (ADVIL,MOTRIN) 800 MG tablet Take 1 tablet (800 mg total) by mouth 3 (three) times daily as needed. 60 tablet 0    lamoTRIgine (LAMICTAL) 150 MG Tab Take 1 tablet (150 mg total) by mouth once daily. 30 tablet 2    pantoprazole (PROTONIX) 40 MG tablet TAKE 1 TABLET BY MOUTH EVERY MORNING 30 MINUTES BEFORE EATING MEAL 30 tablet 11    temazepam (RESTORIL) 7.5 MG Cap TAKE 2 TO 3 CAPSULES BY MOUTH AT BEDTIME AS NEEDED FOR SLEEP 90 capsule 1    valACYclovir (VALTREX) 1000 MG tablet Take 2 tablets (2,000 mg total) by mouth every 12 (twelve) hours. 4 tablet 0     No facility-administered encounter medications on file as of 1/30/2023.     Assessment - Diagnosis - Goals:     Impression: Patient is a 64 y/o F with chronic bipolar disorder, with active major depressive episode despite adherence with medication; has hx of remote substance use disorders. Some progress on insomnia.    Dx: bipolar I disorder, mre depressed; anxiety    Treatment Goals:  Specify outcomes written in observable, behavioral terms: prevent recurrences.    Treatment Plan/Recommendations:   Lamotrigine, fluvoxamine, buspirone for anxiety. Temazepam to 30  mg qhs prn sleep.  Discussed risks, benefits, and alternatives to treatment plan documented above with patient. I answered all patient questions related to this plan and patient expressed understanding and agreement.     Return to Clinic: 4 months    CLINT Soriano MD  Psychiatry  [facility]  [address] , [place] [address]  [phone]

## 2023-02-06 ENCOUNTER — TELEPHONE (OUTPATIENT)
Dept: FAMILY MEDICINE | Facility: CLINIC | Age: 63
End: 2023-02-06
Payer: COMMERCIAL

## 2023-02-06 DIAGNOSIS — Z00.00 PREVENTATIVE HEALTH CARE: Primary | ICD-10-CM

## 2023-02-14 ENCOUNTER — PATIENT MESSAGE (OUTPATIENT)
Dept: FAMILY MEDICINE | Facility: CLINIC | Age: 63
End: 2023-02-14
Payer: COMMERCIAL

## 2023-02-27 ENCOUNTER — OFFICE VISIT (OUTPATIENT)
Dept: FAMILY MEDICINE | Facility: CLINIC | Age: 63
End: 2023-02-27
Payer: COMMERCIAL

## 2023-02-27 ENCOUNTER — LAB VISIT (OUTPATIENT)
Dept: LAB | Facility: HOSPITAL | Age: 63
End: 2023-02-27
Attending: FAMILY MEDICINE
Payer: COMMERCIAL

## 2023-02-27 VITALS
OXYGEN SATURATION: 97 % | TEMPERATURE: 99 F | SYSTOLIC BLOOD PRESSURE: 124 MMHG | HEART RATE: 101 BPM | WEIGHT: 176.38 LBS | DIASTOLIC BLOOD PRESSURE: 72 MMHG | BODY MASS INDEX: 28.34 KG/M2 | HEIGHT: 66 IN

## 2023-02-27 DIAGNOSIS — Z12.31 ENCOUNTER FOR SCREENING MAMMOGRAM FOR MALIGNANT NEOPLASM OF BREAST: ICD-10-CM

## 2023-02-27 DIAGNOSIS — F31.30 BIPOLAR AFFECTIVE DISORDER, CURRENT EPISODE DEPRESSED, CURRENT EPISODE SEVERITY UNSPECIFIED: ICD-10-CM

## 2023-02-27 DIAGNOSIS — E66.3 OVERWEIGHT (BMI 25.0-29.9): ICD-10-CM

## 2023-02-27 DIAGNOSIS — Z00.00 PREVENTATIVE HEALTH CARE: ICD-10-CM

## 2023-02-27 DIAGNOSIS — Z00.00 PREVENTATIVE HEALTH CARE: Primary | ICD-10-CM

## 2023-02-27 LAB
ALBUMIN SERPL BCP-MCNC: 4.2 G/DL (ref 3.5–5.2)
ALP SERPL-CCNC: 94 U/L (ref 55–135)
ALT SERPL W/O P-5'-P-CCNC: 22 U/L (ref 10–44)
ANION GAP SERPL CALC-SCNC: 12 MMOL/L (ref 8–16)
AST SERPL-CCNC: 27 U/L (ref 10–40)
BASOPHILS # BLD AUTO: 0.09 K/UL (ref 0–0.2)
BASOPHILS NFR BLD: 1.1 % (ref 0–1.9)
BILIRUB SERPL-MCNC: 1 MG/DL (ref 0.1–1)
BUN SERPL-MCNC: 11 MG/DL (ref 8–23)
CALCIUM SERPL-MCNC: 10.1 MG/DL (ref 8.7–10.5)
CHLORIDE SERPL-SCNC: 105 MMOL/L (ref 95–110)
CHOLEST SERPL-MCNC: 209 MG/DL (ref 120–199)
CHOLEST/HDLC SERPL: 4.3 {RATIO} (ref 2–5)
CO2 SERPL-SCNC: 23 MMOL/L (ref 23–29)
CREAT SERPL-MCNC: 1 MG/DL (ref 0.5–1.4)
DIFFERENTIAL METHOD: ABNORMAL
EOSINOPHIL # BLD AUTO: 0.2 K/UL (ref 0–0.5)
EOSINOPHIL NFR BLD: 2.3 % (ref 0–8)
ERYTHROCYTE [DISTWIDTH] IN BLOOD BY AUTOMATED COUNT: 13.7 % (ref 11.5–14.5)
EST. GFR  (NO RACE VARIABLE): >60 ML/MIN/1.73 M^2
GLUCOSE SERPL-MCNC: 103 MG/DL (ref 70–110)
HCT VFR BLD AUTO: 45.7 % (ref 37–48.5)
HDLC SERPL-MCNC: 49 MG/DL (ref 40–75)
HDLC SERPL: 23.4 % (ref 20–50)
HGB BLD-MCNC: 14.4 G/DL (ref 12–16)
IMM GRANULOCYTES # BLD AUTO: 0.03 K/UL (ref 0–0.04)
IMM GRANULOCYTES NFR BLD AUTO: 0.4 % (ref 0–0.5)
LDLC SERPL CALC-MCNC: 126.4 MG/DL (ref 63–159)
LYMPHOCYTES # BLD AUTO: 2.1 K/UL (ref 1–4.8)
LYMPHOCYTES NFR BLD: 26.6 % (ref 18–48)
MCH RBC QN AUTO: 28.1 PG (ref 27–31)
MCHC RBC AUTO-ENTMCNC: 31.5 G/DL (ref 32–36)
MCV RBC AUTO: 89 FL (ref 82–98)
MONOCYTES # BLD AUTO: 0.9 K/UL (ref 0.3–1)
MONOCYTES NFR BLD: 11.2 % (ref 4–15)
NEUTROPHILS # BLD AUTO: 4.7 K/UL (ref 1.8–7.7)
NEUTROPHILS NFR BLD: 58.4 % (ref 38–73)
NONHDLC SERPL-MCNC: 160 MG/DL
NRBC BLD-RTO: 0 /100 WBC
PLATELET # BLD AUTO: 429 K/UL (ref 150–450)
PMV BLD AUTO: 10.6 FL (ref 9.2–12.9)
POTASSIUM SERPL-SCNC: 3.8 MMOL/L (ref 3.5–5.1)
PROT SERPL-MCNC: 7.9 G/DL (ref 6–8.4)
RBC # BLD AUTO: 5.13 M/UL (ref 4–5.4)
SODIUM SERPL-SCNC: 140 MMOL/L (ref 136–145)
TRIGL SERPL-MCNC: 168 MG/DL (ref 30–150)
TSH SERPL DL<=0.005 MIU/L-ACNC: 3.48 UIU/ML (ref 0.4–4)
VIT B12 SERPL-MCNC: 1488 PG/ML (ref 210–950)
WBC # BLD AUTO: 7.98 K/UL (ref 3.9–12.7)

## 2023-02-27 PROCEDURE — 1160F PR REVIEW ALL MEDS BY PRESCRIBER/CLIN PHARMACIST DOCUMENTED: ICD-10-PCS | Mod: CPTII,S$GLB,, | Performed by: FAMILY MEDICINE

## 2023-02-27 PROCEDURE — 3078F PR MOST RECENT DIASTOLIC BLOOD PRESSURE < 80 MM HG: ICD-10-PCS | Mod: CPTII,S$GLB,, | Performed by: FAMILY MEDICINE

## 2023-02-27 PROCEDURE — 1159F PR MEDICATION LIST DOCUMENTED IN MEDICAL RECORD: ICD-10-PCS | Mod: CPTII,S$GLB,, | Performed by: FAMILY MEDICINE

## 2023-02-27 PROCEDURE — 99396 PREV VISIT EST AGE 40-64: CPT | Mod: S$GLB,,, | Performed by: FAMILY MEDICINE

## 2023-02-27 PROCEDURE — 3078F DIAST BP <80 MM HG: CPT | Mod: CPTII,S$GLB,, | Performed by: FAMILY MEDICINE

## 2023-02-27 PROCEDURE — 3074F SYST BP LT 130 MM HG: CPT | Mod: CPTII,S$GLB,, | Performed by: FAMILY MEDICINE

## 2023-02-27 PROCEDURE — 84443 ASSAY THYROID STIM HORMONE: CPT | Performed by: FAMILY MEDICINE

## 2023-02-27 PROCEDURE — 80061 LIPID PANEL: CPT | Performed by: FAMILY MEDICINE

## 2023-02-27 PROCEDURE — 3008F PR BODY MASS INDEX (BMI) DOCUMENTED: ICD-10-PCS | Mod: CPTII,S$GLB,, | Performed by: FAMILY MEDICINE

## 2023-02-27 PROCEDURE — 1160F RVW MEDS BY RX/DR IN RCRD: CPT | Mod: CPTII,S$GLB,, | Performed by: FAMILY MEDICINE

## 2023-02-27 PROCEDURE — 99999 PR PBB SHADOW E&M-EST. PATIENT-LVL IV: ICD-10-PCS | Mod: PBBFAC,,, | Performed by: FAMILY MEDICINE

## 2023-02-27 PROCEDURE — 85025 COMPLETE CBC W/AUTO DIFF WBC: CPT | Performed by: FAMILY MEDICINE

## 2023-02-27 PROCEDURE — 99396 PR PREVENTIVE VISIT,EST,40-64: ICD-10-PCS | Mod: S$GLB,,, | Performed by: FAMILY MEDICINE

## 2023-02-27 PROCEDURE — 82607 VITAMIN B-12: CPT | Performed by: FAMILY MEDICINE

## 2023-02-27 PROCEDURE — 3074F PR MOST RECENT SYSTOLIC BLOOD PRESSURE < 130 MM HG: ICD-10-PCS | Mod: CPTII,S$GLB,, | Performed by: FAMILY MEDICINE

## 2023-02-27 PROCEDURE — 99999 PR PBB SHADOW E&M-EST. PATIENT-LVL IV: CPT | Mod: PBBFAC,,, | Performed by: FAMILY MEDICINE

## 2023-02-27 PROCEDURE — 80053 COMPREHEN METABOLIC PANEL: CPT | Performed by: FAMILY MEDICINE

## 2023-02-27 PROCEDURE — 3008F BODY MASS INDEX DOCD: CPT | Mod: CPTII,S$GLB,, | Performed by: FAMILY MEDICINE

## 2023-02-27 PROCEDURE — 36415 COLL VENOUS BLD VENIPUNCTURE: CPT | Mod: PO | Performed by: FAMILY MEDICINE

## 2023-02-27 PROCEDURE — 1159F MED LIST DOCD IN RCRD: CPT | Mod: CPTII,S$GLB,, | Performed by: FAMILY MEDICINE

## 2023-02-27 NOTE — PROGRESS NOTES
CHIEF COMPLAINT:  This is a 63-year-old female here for preventive health exam.     SUBJECTIVE: The patient is doing well without complaints. Patient has become a vegan and has lost 39 lbs in the last year. Her BMI is 28.47. GERD has improved and she no longer takes pantoprazole. Patient takes Luvox, Lamictal, and Buspar for bipolar disorder. She was prescribed temazepam 15 mg for sleeplessness. She reports that Seroquel helped with insomnia more than temazepam. She often takes several temazepam sequentially so that she will sleep for 3 days. She has been more depressed lately. She has a history of mixed hyperlipidemia.     Eye exam September 2022. Mammogram February 2022.  Pap smear November 2019 due November 2024. Colonoscopy April 2022, due again in April 2032.  Tdap October 2015.  Flu vaccine October 2022. COVID 19 vaccine February, March, November 2021, April 2022.     ROS:  GENERAL: Patient denies fever, chills, night sweats. Patient denies weight loss.  Patient denies anorexia, fatigue, weakness or swollen glands.  SKIN: Patient denies rash or hair loss.  HEENT: Patient denies sore throat, ear pain, hearing loss, nasal congestion, or runny nose. Patient denies visual disturbance, eye irritation or discharge.  LUNGS: Patient denies wheeze or hemoptysis.  CARDIOVASCULAR: Patient denies chest pain, shortness of breath, palpitations, syncope or lower extremity edema.  GI: Patient denies abdominal pain, nausea, vomiting, diarrhea, constipation, blood in stool or melena.  GENITOURINARY: Patient denies pelvic pain, vaginal discharge, itch or odor. Patient denies irregular vaginal bleeding. Patient denies dysuria, frequency, hematuria, nocturia, urgency or incontinence.  BREASTS: Patient denies breast pain, mass or nipple discharge.  MUSCULOSKELETAL: Patient denies joint pain, swelling, redness or warmth.  NEUROLOGIC: Patient denies headache, vertigo, paresthesias, weakness in limb, dysarthria, dysphagia or  abnormality of gait.  PSYCHIATRIC: Patient denies anxiety, depression, or memory loss.     OBJECTIVE:   GENERAL: Well-developed well-nourished obese white female alert and oriented x3, in no acute distress. Memory, judgment and cognition without deficit.   SKIN: Clear without rash. Normal color and tone.   HEENT: Eyes: Clear conjunctivae. No scleral icterus. Pupils equal reactive to light and accommodation. Ears: Clear canals. Clear TMs. Nose:  Crusty scabbed lesion left nares.  Without congestion. Pharynx: Without injection or exudates.  NECK: Supple, normal range of motion. No masses, lymphadenopathy or enlarged thyroid. No JVD. Carotids 2+ and equal. No bruits.  LUNGS: Clear to auscultation. Normal respiratory effort.  CARDIOVASCULAR: Regular rhythm, normal S1, S2 without murmur, gallop or rub.  BACK: No CVA or spinal tenderness.  BREASTS: No masses or nipple discharge. Tenderness to palpation right greater than left breast.  ABDOMEN: Normal appearance. Active bowel sounds. Soft, nontender without mass or organomegaly. No rebound or guarding.  EXTREMITIES: Without cyanosis, clubbing or edema. Distal pulses 2+ and equal. Normal range of motion in all extremities. No joint effusion, erythema or warmth.  NEUROLOGIC: Cranial nerves II through XII without deficit. Motor strength equal bilaterally. Sensation normal to touch. Deep tendon reflexes 2+ and equal. Gait without abnormality. No tremor. Negative cerebellar signs.  PELVIC: External:  Confluent erythema in intertriginous regions in genital area extending to the perianal region.  No excoriations.  Vaginal: Clear. Cervix: Normal appearance, nontender.  No Pap.  Uterus: Normal size and shape. Adnexa: Non-tender, without masses. Rectovaginal: Confirms, heme-negative stool x2.    ASSESSMENT:  1. Preventative health care    2. Bipolar affective disorder, current episode depressed, current episode severity unspecified    3. Overweight (BMI 25.0-29.9)    4. Encounter  for screening mammogram for malignant neoplasm of breast      PLAN:  1. Weight reduction. Exercise regularly.  2. Age-appropriate counseling.  3. Fasting lab including vitamin B12 level.  4. Mammogram.  5. Follow up with mental health.  Seek medical attention for worsening condition.  6. Refill medications as needed.  7. Follow up annually.    This note is generated with speech recognition software and is subject to transcription error and sound alike phrases that may be missed by proofreading.

## 2023-02-28 ENCOUNTER — PATIENT MESSAGE (OUTPATIENT)
Dept: FAMILY MEDICINE | Facility: CLINIC | Age: 63
End: 2023-02-28
Payer: COMMERCIAL

## 2023-03-01 ENCOUNTER — PATIENT MESSAGE (OUTPATIENT)
Dept: PSYCHIATRY | Facility: CLINIC | Age: 63
End: 2023-03-01
Payer: COMMERCIAL

## 2023-03-07 ENCOUNTER — OFFICE VISIT (OUTPATIENT)
Dept: PSYCHIATRY | Facility: CLINIC | Age: 63
End: 2023-03-07
Payer: COMMERCIAL

## 2023-03-07 ENCOUNTER — TELEPHONE (OUTPATIENT)
Dept: PSYCHIATRY | Facility: CLINIC | Age: 63
End: 2023-03-07
Payer: COMMERCIAL

## 2023-03-07 DIAGNOSIS — F31.30 BIPOLAR DISORDER, MOST RECENT EPISODE DEPRESSED: Primary | ICD-10-CM

## 2023-03-07 DIAGNOSIS — F41.1 GAD (GENERALIZED ANXIETY DISORDER): ICD-10-CM

## 2023-03-07 DIAGNOSIS — G47.00 INSOMNIA, UNSPECIFIED TYPE: ICD-10-CM

## 2023-03-07 DIAGNOSIS — Z63.9 FAMILY DYNAMICS PROBLEM: ICD-10-CM

## 2023-03-07 PROCEDURE — 90834 PSYTX W PT 45 MINUTES: CPT | Mod: 95,,, | Performed by: SOCIAL WORKER

## 2023-03-07 PROCEDURE — 90834 PR PSYCHOTHERAPY W/PATIENT, 45 MIN: ICD-10-PCS | Mod: 95,,, | Performed by: SOCIAL WORKER

## 2023-03-07 SDOH — SOCIAL DETERMINANTS OF HEALTH (SDOH): PROBLEM RELATED TO PRIMARY SUPPORT GROUP, UNSPECIFIED: Z63.9

## 2023-03-07 NOTE — TELEPHONE ENCOUNTER
Called pt to offer appt today with David Costa LCSW. pt accepted appt today with David Costa LCSW at 1 pm virtually.

## 2023-03-07 NOTE — PROGRESS NOTES
"  Individual Psychotherapy (PhD/LCSW)    5/3/21    Site:  [place]           Therapeutic Intervention: Met with patient in clinic.  Met with patient. Outpatient - Insight oriented psychotherapy 45 min - CPT code 68081 and Outpatient - Supportive psychotherapy 45 min - CPT Code 03746    Chief complaint/reason for encounter: depression, mood swings, anxiety, grief and loss, and interpersonal     Interval history and content of current session:    62 year old female patient returning for psychotherapy for her bipolar disorder and family and work stress. Reporting she had decided her medication was not working; so she stopped taking it November first; dis not tell anyone.  Finally admitted to partner, who told her she needs to get back on it.  S she started back as of March 1.  Said she has regained some weight on the medication, which she is not happy about.  Said she realizes she is eating too many processed foods.  In terms of mood, she said she is "probably the worst in the moring."  Still feeling pulled between two houses; still unwilling to remedy that situation.  Still wants her mother to move in with the patient and her partner, which neither her partner nor her mother is agreeable to.  Family is planning a trip to [place] in July, in honor of her partner's July 5th 71th birthday.  She reported a 3 day deep depressive episode late January, followed shortly by Covid infection.  Got through both of those episodes.  Discussed the ongoing challenge of not overreacting to things when she is experiencing mood swings.  Patient denied any si/hi, psychosis, cognitive deficits, or substance abuse. Supportive therapy provided.  Plan is to follow up for psychotherapy as soon as possible.  Looking for cancellation openings.  Next appointment already scheduled is not until April 7.      Treatment plan:  Target symptoms: depression, anxiety , substance abuse, mood swings, grief, relationship tension  Why chosen therapy is " appropriate versus another modality: relevant to diagnosis, patient responds to this modality  Outcome monitoring methods: self-report, observation  Therapeutic intervention type: insight oriented psychotherapy, supportive psychotherapy    Risk parameters:  Patient reports no suicidal ideation  Patient reports no homicidal ideation  Patient reports no self-injurious behavior  Patient reports no violent behavior    Verbal deficits: None    Patient's response to intervention:  The patient's response to intervention is accepting.    Progress toward goals and other mental status changes:  The patient's progress toward goals is mixed    Diagnosis:     ICD-10-CM ICD-9-CM   1. Bipolar disorder, most recent episode depressed  F31.30 296.50   2. ERNESTINE (generalized anxiety disorder)  F41.1 300.02   3. Insomnia, unspecified type  G47.00 780.52   4. Family dynamics problem  Z63.9 V61.9     History of Alcohol use disorder     Plan:  individual psychotherapy and medication management by physician    Return to clinic: as scheduled    Length of Service (minutes): 45

## 2023-04-01 NOTE — PROGRESS NOTES
Subjective:      June Stevens is a 63 y.o. female, here today with C/C of:  Thumb pain      HPI:    Mrs. Stevens is here with c/o right thumb pain.  Onset was about 3 to 4 weeks ago.   She is right-handed dominant.  Thinks may possibly be due to using a different computer mouse over past few weeks.  The pain is moderate, worsens with movement, and is relieved by rest.   There is no associated numbness, tingling, weakness in right hand or fingers.   Evaluation to date: none.   Treatment to date:  stretching exercises.      Review of Systems    Per HPI      Review of patient's allergies indicates:   Allergen Reactions    Demerol  [meperidine]      Other reaction(s): Vomiting       Patient Active Problem List   Diagnosis    Bipolar disorder    Generalized anxiety disorder    Mixed hyperlipidemia    GERD (gastroesophageal reflux disease)    Overweight (BMI 25.0-29.9)         Current Outpatient Medications:     busPIRone (BUSPAR) 30 MG Tab, TAKE 1 TABLET(30 MG) BY MOUTH TWICE DAILY, Disp: 180 tablet, Rfl: 0    clotrimazole-betamethasone 1-0.05% (LOTRISONE) cream, Apply topically 2 (two) times daily., Disp: 1 each, Rfl: 1    fluvoxaMINE (LUVOX) 50 MG Tab tablet, Take 1 tablet (50 mg total) by mouth every evening., Disp: 30 tablet, Rfl: 2    ibuprofen (ADVIL,MOTRIN) 800 MG tablet, Take 1 tablet (800 mg total) by mouth 3 (three) times daily as needed., Disp: 60 tablet, Rfl: 0    lamoTRIgine (LAMICTAL) 150 MG Tab, Take 1 tablet (150 mg total) by mouth once daily., Disp: 30 tablet, Rfl: 2    pantoprazole (PROTONIX) 40 MG tablet, TAKE 1 TABLET BY MOUTH EVERY MORNING 30 MINUTES BEFORE EATING MEAL, Disp: 30 tablet, Rfl: 11    temazepam (RESTORIL) 15 mg Cap, Take 1 to 2 tablets at bedtime as needed for sleep., Disp: 60 capsule, Rfl: 2    valACYclovir (VALTREX) 1000 MG tablet, Take 2 tablets (2,000 mg total) by mouth every 12 (twelve) hours., Disp: 4 tablet, Rfl: 0      Past medical, surgical, family and social histories have  "been reviewed today.      Objective:     Vitals:    04/04/23 1132   BP: 114/74   Pulse: 96   Temp: 97.3 °F (36.3 °C)   SpO2: 98%   Weight: 82.6 kg (182 lb 1.6 oz)   Height: 5' 6" (1.676 m)   PainSc:   6   PainLoc: Right thumb       Physical Exam  Vitals reviewed.   Musculoskeletal:      Right hand: Tenderness present. No swelling. Decreased range of motion (slight due to pain). Normal strength. Normal sensation. Normal capillary refill. Normal pulse.        Hands:       Comments: Pos Finkelstein test on the right   Neurological:      Mental Status: She is alert and oriented to person, place, and time.       Diagnosis/Assessment:     1. Tenosynovitis, de Quervain  New problem identified, onset 3 to 4 weeks ago.  Home/work care discussed.  Rec thumb spica splint.  Diagnosis information added to AVS.  Monitor.        Follow-up:     Contact office back in pain worsens or persists.  RTC as directed and/or prn.        GUY Lyles  [facility]       20 minutes of total time spent on the encounter, which includes face to face time and non-face to face time preparing to see the patient.  This includes obtaining and/or reviewing separately obtained history, performing a medically appropriate examination and/or evaluation, and counseling and educating the patient/family/caregiver.  Includes documenting clinical information in the electronic or other health record, independently interpreting results (not separately reported) and communicating results to the patient/family/caregiver, with care coordination (not separately reported).  Medications, tests and/or procedures ordered as necessary along with referring and communicating with other health professionals (when not separately reported).  "

## 2023-04-03 ENCOUNTER — OFFICE VISIT (OUTPATIENT)
Dept: PSYCHIATRY | Facility: CLINIC | Age: 63
End: 2023-04-03
Payer: COMMERCIAL

## 2023-04-03 DIAGNOSIS — G47.00 INSOMNIA, UNSPECIFIED TYPE: ICD-10-CM

## 2023-04-03 DIAGNOSIS — F31.30 BIPOLAR DISORDER, MOST RECENT EPISODE DEPRESSED: Primary | ICD-10-CM

## 2023-04-03 DIAGNOSIS — Z63.9 FAMILY DYNAMICS PROBLEM: ICD-10-CM

## 2023-04-03 DIAGNOSIS — F41.1 GAD (GENERALIZED ANXIETY DISORDER): ICD-10-CM

## 2023-04-03 PROCEDURE — 99999 PR PBB SHADOW E&M-EST. PATIENT-LVL I: CPT | Mod: PBBFAC,,, | Performed by: SOCIAL WORKER

## 2023-04-03 PROCEDURE — 90834 PR PSYCHOTHERAPY W/PATIENT, 45 MIN: ICD-10-PCS | Mod: S$GLB,,, | Performed by: SOCIAL WORKER

## 2023-04-03 PROCEDURE — 99999 PR PBB SHADOW E&M-EST. PATIENT-LVL I: ICD-10-PCS | Mod: PBBFAC,,, | Performed by: SOCIAL WORKER

## 2023-04-03 PROCEDURE — 90834 PSYTX W PT 45 MINUTES: CPT | Mod: S$GLB,,, | Performed by: SOCIAL WORKER

## 2023-04-03 SDOH — SOCIAL DETERMINANTS OF HEALTH (SDOH): PROBLEM RELATED TO PRIMARY SUPPORT GROUP, UNSPECIFIED: Z63.9

## 2023-04-04 ENCOUNTER — OFFICE VISIT (OUTPATIENT)
Dept: FAMILY MEDICINE | Facility: CLINIC | Age: 63
End: 2023-04-04
Payer: COMMERCIAL

## 2023-04-04 VITALS
OXYGEN SATURATION: 98 % | BODY MASS INDEX: 29.27 KG/M2 | SYSTOLIC BLOOD PRESSURE: 114 MMHG | DIASTOLIC BLOOD PRESSURE: 74 MMHG | TEMPERATURE: 97 F | HEIGHT: 66 IN | WEIGHT: 182.13 LBS | HEART RATE: 96 BPM

## 2023-04-04 DIAGNOSIS — M65.4 TENOSYNOVITIS, DE QUERVAIN: Primary | ICD-10-CM

## 2023-04-04 PROCEDURE — 99999 PR PBB SHADOW E&M-EST. PATIENT-LVL IV: CPT | Mod: PBBFAC,,, | Performed by: REGISTERED NURSE

## 2023-04-04 PROCEDURE — 1159F MED LIST DOCD IN RCRD: CPT | Mod: CPTII,S$GLB,, | Performed by: REGISTERED NURSE

## 2023-04-04 PROCEDURE — 3008F BODY MASS INDEX DOCD: CPT | Mod: CPTII,S$GLB,, | Performed by: REGISTERED NURSE

## 2023-04-04 PROCEDURE — 3078F PR MOST RECENT DIASTOLIC BLOOD PRESSURE < 80 MM HG: ICD-10-PCS | Mod: CPTII,S$GLB,, | Performed by: REGISTERED NURSE

## 2023-04-04 PROCEDURE — 3078F DIAST BP <80 MM HG: CPT | Mod: CPTII,S$GLB,, | Performed by: REGISTERED NURSE

## 2023-04-04 PROCEDURE — 99213 OFFICE O/P EST LOW 20 MIN: CPT | Mod: S$GLB,,, | Performed by: REGISTERED NURSE

## 2023-04-04 PROCEDURE — 99213 PR OFFICE/OUTPT VISIT, EST, LEVL III, 20-29 MIN: ICD-10-PCS | Mod: S$GLB,,, | Performed by: REGISTERED NURSE

## 2023-04-04 PROCEDURE — 99999 PR PBB SHADOW E&M-EST. PATIENT-LVL IV: ICD-10-PCS | Mod: PBBFAC,,, | Performed by: REGISTERED NURSE

## 2023-04-04 PROCEDURE — 3074F PR MOST RECENT SYSTOLIC BLOOD PRESSURE < 130 MM HG: ICD-10-PCS | Mod: CPTII,S$GLB,, | Performed by: REGISTERED NURSE

## 2023-04-04 PROCEDURE — 3008F PR BODY MASS INDEX (BMI) DOCUMENTED: ICD-10-PCS | Mod: CPTII,S$GLB,, | Performed by: REGISTERED NURSE

## 2023-04-04 PROCEDURE — 3074F SYST BP LT 130 MM HG: CPT | Mod: CPTII,S$GLB,, | Performed by: REGISTERED NURSE

## 2023-04-04 PROCEDURE — 1159F PR MEDICATION LIST DOCUMENTED IN MEDICAL RECORD: ICD-10-PCS | Mod: CPTII,S$GLB,, | Performed by: REGISTERED NURSE

## 2023-04-11 NOTE — PROGRESS NOTES
"  Individual Psychotherapy (PhD/LCSW)    5/3/21    Site:  [facility]           Therapeutic Intervention: Met with patient in clinic.  Met with patient. Outpatient - Insight oriented psychotherapy 45 min - CPT code 32555 and Outpatient - Supportive psychotherapy 45 min - CPT Code 78009    Chief complaint/reason for encounter: depression, mood swings, anxiety, grief and loss, and interpersonal     Interval history and content of current session:    Late entry for 4/3/23. 62 year old female patient returning for psychotherapy for her bipolar disorder and family and work stress. Previously seen 3/7/23.   Patient presented to clinic casually dressed.  Reported she is back on medication as of March 1st, after having stopped because she questioned its impact or lack thereof.  Reported her spouse insisted to her that it makes a difference.  She reported she has not missed any work since getting back on it, having called in numerous times before.  Now, however, she reports she is concerned, she notices herself cursing more often, feeling chronically irritable.  Patient said though her legal name is June, she feels like she is really  to "zandra Watkins," in the colloquial sense.  Said some of her spouse's prejudices bother her; she thinks she has basic understanding of what led to those prejudices, but she remains concerned.  Described still living between two houses, the one where her mother lives, and the one where her spouse lives.    Patient denied any si/hi, psychosis, cognitive deficits, or substance abuse. Supportive therapy provided.  Scheduled for follow up 6/16/23.       Treatment plan:  Target symptoms: depression, anxiety , substance abuse, mood swings, grief, relationship tension  Why chosen therapy is appropriate versus another modality: relevant to diagnosis, patient responds to this modality  Outcome monitoring methods: self-report, observation  Therapeutic intervention type: insight oriented psychotherapy, " supportive psychotherapy    Risk parameters:  Patient reports no suicidal ideation  Patient reports no homicidal ideation  Patient reports no self-injurious behavior  Patient reports no violent behavior    Verbal deficits: None    Patient's response to intervention:  The patient's response to intervention is accepting.    Progress toward goals and other mental status changes:  The patient's progress toward goals is mixed    Diagnosis:     ICD-10-CM ICD-9-CM   1. Bipolar disorder, most recent episode depressed  F31.30 296.50   2. ERNESTINE (generalized anxiety disorder)  F41.1 300.02   3. Insomnia, unspecified type  G47.00 780.52   4. Family dynamics problem  Z63.9 V61.9     History of Alcohol use disorder     Plan:  individual psychotherapy and medication management by physician    Return to clinic: as scheduled    Length of Service (minutes): 45

## 2023-04-15 ENCOUNTER — PATIENT MESSAGE (OUTPATIENT)
Dept: FAMILY MEDICINE | Facility: CLINIC | Age: 63
End: 2023-04-15
Payer: COMMERCIAL

## 2023-04-17 ENCOUNTER — HOSPITAL ENCOUNTER (OUTPATIENT)
Dept: RADIOLOGY | Facility: HOSPITAL | Age: 63
Discharge: HOME | End: 2023-04-17
Attending: FAMILY MEDICINE
Payer: COMMERCIAL

## 2023-04-17 DIAGNOSIS — M65.4 TENOSYNOVITIS, DE QUERVAIN: Primary | ICD-10-CM

## 2023-04-17 DIAGNOSIS — Z12.31 ENCOUNTER FOR SCREENING MAMMOGRAM FOR MALIGNANT NEOPLASM OF BREAST: ICD-10-CM

## 2023-04-17 PROCEDURE — 77067 MAMMO DIGITAL SCREENING BILAT WITH TOMO: ICD-10-PCS | Mod: 26,,, | Performed by: RADIOLOGY

## 2023-04-17 PROCEDURE — 77063 BREAST TOMOSYNTHESIS BI: CPT | Mod: 26,,, | Performed by: RADIOLOGY

## 2023-04-17 PROCEDURE — 77067 SCR MAMMO BI INCL CAD: CPT | Mod: TC

## 2023-04-17 PROCEDURE — 77063 MAMMO DIGITAL SCREENING BILAT WITH TOMO: ICD-10-PCS | Mod: 26,,, | Performed by: RADIOLOGY

## 2023-04-17 PROCEDURE — 77067 SCR MAMMO BI INCL CAD: CPT | Mod: 26,,, | Performed by: RADIOLOGY

## 2023-04-19 RX ORDER — TEMAZEPAM 15 MG/1
CAPSULE ORAL
Qty: 60 CAPSULE | Refills: 0 | Status: SHIPPED | OUTPATIENT
Start: 2023-04-19 | End: 2023-05-01 | Stop reason: SDUPTHER

## 2023-04-24 DIAGNOSIS — M79.641 RIGHT HAND PAIN: Primary | ICD-10-CM

## 2023-04-25 ENCOUNTER — OFFICE VISIT (OUTPATIENT)
Dept: ORTHOPEDICS | Facility: CLINIC | Age: 63
End: 2023-04-25
Payer: COMMERCIAL

## 2023-04-25 ENCOUNTER — HOSPITAL ENCOUNTER (OUTPATIENT)
Dept: RADIOLOGY | Facility: HOSPITAL | Age: 63
Discharge: HOME | End: 2023-04-25
Attending: ORTHOPAEDIC SURGERY
Payer: COMMERCIAL

## 2023-04-25 VITALS — HEIGHT: 66 IN | WEIGHT: 182 LBS | BODY MASS INDEX: 29.25 KG/M2

## 2023-04-25 DIAGNOSIS — M65.4 TENOSYNOVITIS, DE QUERVAIN: ICD-10-CM

## 2023-04-25 DIAGNOSIS — M79.641 RIGHT HAND PAIN: ICD-10-CM

## 2023-04-25 DIAGNOSIS — M18.11 ARTHRITIS OF CARPOMETACARPAL (CMC) JOINT OF RIGHT THUMB: Primary | ICD-10-CM

## 2023-04-25 PROCEDURE — 99999 PR PBB SHADOW E&M-EST. PATIENT-LVL III: CPT | Mod: PBBFAC,,, | Performed by: ORTHOPAEDIC SURGERY

## 2023-04-25 PROCEDURE — 99203 OFFICE O/P NEW LOW 30 MIN: CPT | Mod: S$GLB,,, | Performed by: ORTHOPAEDIC SURGERY

## 2023-04-25 PROCEDURE — 99203 PR OFFICE/OUTPT VISIT, NEW, LEVL III, 30-44 MIN: ICD-10-PCS | Mod: S$GLB,,, | Performed by: ORTHOPAEDIC SURGERY

## 2023-04-25 PROCEDURE — 73130 XR HAND COMPLETE 3 VIEW RIGHT: ICD-10-PCS | Mod: 26,RT,, | Performed by: RADIOLOGY

## 2023-04-25 PROCEDURE — 3008F BODY MASS INDEX DOCD: CPT | Mod: CPTII,S$GLB,, | Performed by: ORTHOPAEDIC SURGERY

## 2023-04-25 PROCEDURE — 1159F PR MEDICATION LIST DOCUMENTED IN MEDICAL RECORD: ICD-10-PCS | Mod: CPTII,S$GLB,, | Performed by: ORTHOPAEDIC SURGERY

## 2023-04-25 PROCEDURE — 99999 PR PBB SHADOW E&M-EST. PATIENT-LVL III: ICD-10-PCS | Mod: PBBFAC,,, | Performed by: ORTHOPAEDIC SURGERY

## 2023-04-25 PROCEDURE — 73130 X-RAY EXAM OF HAND: CPT | Mod: TC,RT

## 2023-04-25 PROCEDURE — 1160F RVW MEDS BY RX/DR IN RCRD: CPT | Mod: CPTII,S$GLB,, | Performed by: ORTHOPAEDIC SURGERY

## 2023-04-25 PROCEDURE — 1160F PR REVIEW ALL MEDS BY PRESCRIBER/CLIN PHARMACIST DOCUMENTED: ICD-10-PCS | Mod: CPTII,S$GLB,, | Performed by: ORTHOPAEDIC SURGERY

## 2023-04-25 PROCEDURE — 1159F MED LIST DOCD IN RCRD: CPT | Mod: CPTII,S$GLB,, | Performed by: ORTHOPAEDIC SURGERY

## 2023-04-25 PROCEDURE — 73130 X-RAY EXAM OF HAND: CPT | Mod: 26,RT,, | Performed by: RADIOLOGY

## 2023-04-25 PROCEDURE — 3008F PR BODY MASS INDEX (BMI) DOCUMENTED: ICD-10-PCS | Mod: CPTII,S$GLB,, | Performed by: ORTHOPAEDIC SURGERY

## 2023-05-01 ENCOUNTER — OFFICE VISIT (OUTPATIENT)
Dept: PSYCHIATRY | Facility: CLINIC | Age: 63
End: 2023-05-01
Payer: COMMERCIAL

## 2023-05-01 DIAGNOSIS — F41.1 GAD (GENERALIZED ANXIETY DISORDER): ICD-10-CM

## 2023-05-01 DIAGNOSIS — F31.30 BIPOLAR DISORDER, MOST RECENT EPISODE DEPRESSED: Primary | ICD-10-CM

## 2023-05-01 DIAGNOSIS — G47.00 INSOMNIA, UNSPECIFIED TYPE: ICD-10-CM

## 2023-05-01 PROCEDURE — 1160F PR REVIEW ALL MEDS BY PRESCRIBER/CLIN PHARMACIST DOCUMENTED: ICD-10-PCS | Mod: CPTII,95,, | Performed by: PSYCHIATRY & NEUROLOGY

## 2023-05-01 PROCEDURE — 1159F PR MEDICATION LIST DOCUMENTED IN MEDICAL RECORD: ICD-10-PCS | Mod: CPTII,95,, | Performed by: PSYCHIATRY & NEUROLOGY

## 2023-05-01 PROCEDURE — 99214 OFFICE O/P EST MOD 30 MIN: CPT | Mod: 95,,, | Performed by: PSYCHIATRY & NEUROLOGY

## 2023-05-01 PROCEDURE — 1160F RVW MEDS BY RX/DR IN RCRD: CPT | Mod: CPTII,95,, | Performed by: PSYCHIATRY & NEUROLOGY

## 2023-05-01 PROCEDURE — 99214 PR OFFICE/OUTPT VISIT, EST, LEVL IV, 30-39 MIN: ICD-10-PCS | Mod: 95,,, | Performed by: PSYCHIATRY & NEUROLOGY

## 2023-05-01 PROCEDURE — 1159F MED LIST DOCD IN RCRD: CPT | Mod: CPTII,95,, | Performed by: PSYCHIATRY & NEUROLOGY

## 2023-05-01 RX ORDER — BUSPIRONE HYDROCHLORIDE 30 MG/1
TABLET ORAL
Qty: 180 TABLET | Refills: 0 | Status: SHIPPED | OUTPATIENT
Start: 2023-05-01 | End: 2023-08-24 | Stop reason: SDUPTHER

## 2023-05-01 RX ORDER — LAMOTRIGINE 150 MG/1
150 TABLET ORAL DAILY
Qty: 30 TABLET | Refills: 2 | Status: SHIPPED | OUTPATIENT
Start: 2023-05-01 | End: 2023-08-24 | Stop reason: SDUPTHER

## 2023-05-01 RX ORDER — FLUVOXAMINE MALEATE 50 MG/1
50 TABLET ORAL NIGHTLY
Qty: 30 TABLET | Refills: 2 | Status: SHIPPED | OUTPATIENT
Start: 2023-05-01 | End: 2023-08-22

## 2023-05-01 RX ORDER — TEMAZEPAM 15 MG/1
CAPSULE ORAL
Qty: 60 CAPSULE | Refills: 2 | Status: SHIPPED | OUTPATIENT
Start: 2023-05-01 | End: 2023-05-02 | Stop reason: SDUPTHER

## 2023-05-01 NOTE — PROGRESS NOTES
"Outpatient Psychiatry Follow-up Visit (MD/NP)    2023    June Stevens, a 63 y.o. female, presenting for follow-up visit. Met with patient.    Reason for Encounter: Chief complaint/reason for encounter: addictive disorder, depression, mood swings, anxiety, behavior and interpersonal.     Interval Hx: Patient seen and interviewed for follow-up, about five months since last visit. This was a VIDEO VISIT. She was at home. Describes ongoing mood symptoms, somewhat attenuated from previously. Starting with a . Lost weight - Nighttime eating, gaining back. No new medications. Reports problems with sleep are ongoing - insomnia despite adherence with 22.5 mg temazepam. Middle insomnia. Continues to participate in psychotherapy with David Costa. No side effects. Denies new mental health symptoms, new health conditions.     Background: Pt is a 58 y/o F who presents for establishment of psychiatric care, previously evaluated by Mr. Junito Costa. From his note: reporting chief complaint of "my partner thought since I am getting medication, I ought probably to have some talk therapy as well." Pt described a history of mood swings & recurrent depression going back to her teenage years, a recurrent struggle with anger & angry outbursts, & poor coping skills, relying too much on binge drinking (less than weekly, but always to a point of heavy intoxication) to blot out anger & anxiety. She described a fragile self-esteem, pattern of defensiveness, heightened irritability, & frequent depressive cycles. She believes she has abandonment issues from her parents having left for [place] abruptly when she was just 4 years old, leaving her & her younger sister with a grandmother. Mom returned alone after 1 year but never fully embodied a parental figure to the pt. Her grandmother, who represented stability,  when the pt was 27. She recalls mood issues going back to Jr High age, age 12 or 13. She said that was " "when social issues at school started to become more difficult as she & those around her started noticing she wasn't conforming to stereotypical feminine norms & came to be suspected of being a lesbian. She described an emotional struggle between her Gnosticist beliefs & growing awareness of her same-sex orientation. She said she tried to date boys. She reported a lot of peer rejection, being known as "a alcides lesbian," & she attempted suicide at age 17, stepping out in front of an oncoming car. By age 19 she had been introduced to the bhadnari bar scene, which she experienced as a crucial affirmation that she isn't alone, but which met with her mother's disapproval; it took her mother years to become more accepting. She described her current living situation as a split between 2 households, 1 with her partner of 25 years, who is older & "very controlling," & the other with the pt's mother. She reports tension in her primary relationship that involves infidelity on her part back in '09, an affair stemming from a high school reunion. She said her partner now polices her, & she feels resentful but also doesn't want to leave her partner over it. The pt denied any suicidal or homicidal ideation, though endorsing remote history of a suicidal episode as a teenager. Denied any cognitive deficit, psychosis, mood swings, or recreational drug use. Endorsed past struggle with some alcohol abuse, a period of abstinence with AA participation, & now periodic binges. Endorses struggle with explosive anger. Identified therapeutic goals include reduction of depression & anxiety, improved self-esteem, & coping skills, including anger management, assertiveness communication skills.        Symptoms:   Mood: depressed mood, diminished interest and worthlessness/guilt  Anxiety: excessive anxiety/worry, restlessness/keyed up and irritability  Substance abuse: substance tolerance and take more than intended  Cognitive functioning: denied  Health " "behaviors: some alcohol binges     Psychiatric history: psychotropic management by PCP & prior suicide attempt(s); no chemical abuse treatment reported but did attend AA for years & had a sponsor; now relapsed with a binge pattern of use. Family history of psychiatric illness: suspects father bipolar (never diagnosed); sister with clinical anxiety. Social history: See history above; oldest of 3; younger brother drowned as an infant. Younger sister is her closest friend. Chaotic childhood; parents were very young; left pt & younger sister with grandmother when she was 4; father never returned; mother came back after 1 year. Grandmother, an aunt, & uncle were parental figures. Pt described growing social tension in school in middle school & high school, related to her budding identity as a masculine lesbian. Reported running away from school & home several times in teens. Attempted suicide at 17. Discovered bhandari bars at 19 found the start of a sort of community. 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 Current committed relationship of the past 25 years; described partner as controling, domineering, but "she takes care of me." Endorsed tension of pt's past infidelity a decade ago. Previous longest relationship was for 5 years. No biological children of her own. Never in a relationship with a man. Loves animals; finds them easier to relate to than people. Currently lives between 2 houses, one with her partner, the other with her mother.       Substance use:   Alcohol: binge pattern of abuse; denied any known health damage from alcohol   Drugs: none   Tobacco: none   Caffeine: average of 2 and a half cups of coffee daily    Pt confirms the above history. Reports that she's been anxious her "entire life"; overriding distress & impairment. Past dx'es include depression, bipolar disorder (some impulsive shopping). Meds through Dr. Bee Landon for many years, but stopped seeing  " "[facility] in summer 2019. Found treatment unaffordable. Off medicine for 3-4 months. "had some left but thought it wasn't working, it makes me gain weight, & I'm sluggish". Thinks that these things improved off the medication.     "got an ultimatum" to get back onto medication by her partner who patient says has observed that patient is less irritable & agitated on medication (but she doesn't see this herself).     Endorses "yes" to most items on MDQ    Psych Hx: "anger issues" since a kid. "fought a lot", "usually defending other people", "I couldn't verbally say what I felt, so I thought", though rarely disciplinary problems in school. Protected her sister, friends.     Treated with medication by PCP in  or , dx'ed with depression in - by Dr. hZong. Diagnosis later changed to bipolar disorder, but she doesn't believe this matches her. She's read about borderline personality & thinks that features of this diagnosis better match her understanding of herself. Was mostly off medication after Dr. Landon left private practice.     Went back to Dr. Landon in  when she became available again, has seen her since & has continued on her current regimen. Was seeing a therapist that works with her, Namita Lopez. They recommended a long-term treatment program in [place] at one point. Reports mood problems have led her to be passed up for promotions, advancement. Denies periods of mood elevation that have led to dysfunction.    Family Hx: father   Social Hx: as above. father left at age 6. Wasn't in her life after that, but sought him out at 41, but was disappointed by the relationship because he wanted money from her.   Has a younger sister & younger brother. Brother , drowned in the bathtub.   Lives between two households (between mother's and partner's). Mother is critical.   Master's degree. Works for the state.   No arrests.     Review Of Systems:     GENERAL:  No weight gain or loss  SKIN:  No rashes " or lacerations  HEAD:  No headaches  CHEST:  No shortness of breath, hyperventilation or cough  CARDIOVASCULAR:  No tachycardia or chest pain  ABDOMEN:  No nausea, vomiting, pain, constipation or diarrhea  URINARY:  No frequency, dysuria or sexual dysfunction  ENDOCRINE:  No polydipsia, polyuria  MUSCULOSKELETAL:  No pain or stiffness of the joints  NEUROLOGIC:  No weakness, sensory changes, seizures, confusion, memory loss, tremor or other abnormal movements    Current Evaluation:     Nutritional Screening: Considering the patient's height and weight, medications, medical history and preferences, should a referral be made to the dietitian? no    Constitutional  Vitals:  Most recent vital signs, dated less than 90 days prior to this appointment, were reviewed.       General:  unremarkable, age appropriate     Musculoskeletal  Muscle Strength/Tone:  no tremor, no tic   Gait & Station:  non-ataxic     Psychiatric  Appearance: casually dressed & groomed;   Behavior: calm,   Cooperation: cooperative with assessment  Speech: normal rate, volume, tone  Thought Process: linear, goal-directed  Thought Content: No suicidal or homicidal ideation; no delusions  Affect: anxious  Mood: anxious  Perceptions: No auditory or visual hallucinations  Level of Consciousness: alert throughout interview  Insight: fair  Cognition: Oriented to person, place, time, & situation  Memory: no apparent deficits to general clinical interview; not formally assessed  Attention/Concentration: no apparent deficits to general clinical interview; not formally assessed  Fund of Knowledge: average by vocabulary/education    Laboratory Data  No visits with results within 1 Month(s) from this visit.   Latest known visit with results is:   Lab Visit on 02/27/2023   Component Date Value Ref Range Status    Sodium 02/27/2023 140  136 - 145 mmol/L Final    Potassium 02/27/2023 3.8  3.5 - 5.1 mmol/L Final    Chloride 02/27/2023 105  95 - 110 mmol/L Final    CO2  02/27/2023 23  23 - 29 mmol/L Final    Glucose 02/27/2023 103  70 - 110 mg/dL Final    BUN 02/27/2023 11  8 - 23 mg/dL Final    Creatinine 02/27/2023 1.0  0.5 - 1.4 mg/dL Final    Calcium 02/27/2023 10.1  8.7 - 10.5 mg/dL Final    Total Protein 02/27/2023 7.9  6.0 - 8.4 g/dL Final    Albumin 02/27/2023 4.2  3.5 - 5.2 g/dL Final    Total Bilirubin 02/27/2023 1.0  0.1 - 1.0 mg/dL Final    Alkaline Phosphatase 02/27/2023 94  55 - 135 U/L Final    AST 02/27/2023 27  10 - 40 U/L Final    ALT 02/27/2023 22  10 - 44 U/L Final    Anion Gap 02/27/2023 12  8 - 16 mmol/L Final    eGFR 02/27/2023 >60.0  >60 mL/min/1.73 m^2 Final    WBC 02/27/2023 7.98  3.90 - 12.70 K/uL Final    RBC 02/27/2023 5.13  4.00 - 5.40 M/uL Final    Hemoglobin 02/27/2023 14.4  12.0 - 16.0 g/dL Final    Hematocrit 02/27/2023 45.7  37.0 - 48.5 % Final    MCV 02/27/2023 89  82 - 98 fL Final    MCH 02/27/2023 28.1  27.0 - 31.0 pg Final    MCHC 02/27/2023 31.5 (L)  32.0 - 36.0 g/dL Final    RDW 02/27/2023 13.7  11.5 - 14.5 % Final    Platelets 02/27/2023 429  150 - 450 K/uL Final    MPV 02/27/2023 10.6  9.2 - 12.9 fL Final    Immature Granulocytes 02/27/2023 0.4  0.0 - 0.5 % Final    Gran # (ANC) 02/27/2023 4.7  1.8 - 7.7 K/uL Final    Immature Grans (Abs) 02/27/2023 0.03  0.00 - 0.04 K/uL Final    Lymph # 02/27/2023 2.1  1.0 - 4.8 K/uL Final    Mono # 02/27/2023 0.9  0.3 - 1.0 K/uL Final    Eos # 02/27/2023 0.2  0.0 - 0.5 K/uL Final    Baso # 02/27/2023 0.09  0.00 - 0.20 K/uL Final    nRBC 02/27/2023 0  0 /100 WBC Final    Gran % 02/27/2023 58.4  38.0 - 73.0 % Final    Lymph % 02/27/2023 26.6  18.0 - 48.0 % Final    Mono % 02/27/2023 11.2  4.0 - 15.0 % Final    Eosinophil % 02/27/2023 2.3  0.0 - 8.0 % Final    Basophil % 02/27/2023 1.1  0.0 - 1.9 % Final    Differential Method 02/27/2023 Automated   Final    Cholesterol 02/27/2023 209 (H)  120 - 199 mg/dL Final    Triglycerides 02/27/2023 168 (H)  30 - 150 mg/dL Final    HDL 02/27/2023 49  40 - 75 mg/dL  Final    LDL Cholesterol 02/27/2023 126.4  63.0 - 159.0 mg/dL Final    HDL/Cholesterol Ratio 02/27/2023 23.4  20.0 - 50.0 % Final    Total Cholesterol/HDL Ratio 02/27/2023 4.3  2.0 - 5.0 Final    Non-HDL Cholesterol 02/27/2023 160  mg/dL Final    TSH 02/27/2023 3.476  0.400 - 4.000 uIU/mL Final    Vitamin B-12 02/27/2023 1488 (H)  210 - 950 pg/mL Final     Medications  Outpatient Encounter Medications as of 5/1/2023   Medication Sig Dispense Refill    busPIRone (BUSPAR) 30 MG Tab TAKE 1 TABLET(30 MG) BY MOUTH TWICE DAILY 180 tablet 0    clotrimazole-betamethasone 1-0.05% (LOTRISONE) cream Apply topically 2 (two) times daily. 1 each 1    fluvoxaMINE (LUVOX) 50 MG Tab tablet Take 1 tablet (50 mg total) by mouth every evening. 30 tablet 2    ibuprofen (ADVIL,MOTRIN) 800 MG tablet Take 1 tablet (800 mg total) by mouth 3 (three) times daily as needed. 60 tablet 0    lamoTRIgine (LAMICTAL) 150 MG Tab Take 1 tablet (150 mg total) by mouth once daily. 30 tablet 2    pantoprazole (PROTONIX) 40 MG tablet TAKE 1 TABLET BY MOUTH EVERY MORNING 30 MINUTES BEFORE EATING MEAL 30 tablet 11    temazepam (RESTORIL) 15 mg Cap Take 1 to 2 tablets at bedtime as needed for sleep. 60 capsule 0    valACYclovir (VALTREX) 1000 MG tablet Take 2 tablets (2,000 mg total) by mouth every 12 (twelve) hours. 4 tablet 0    [DISCONTINUED] temazepam (RESTORIL) 15 mg Cap Take 1 to 2 tablets at bedtime as needed for sleep. 60 capsule 2     No facility-administered encounter medications on file as of 5/1/2023.     Assessment - Diagnosis - Goals:     Impression: Patient is a 64 y/o F with chronic bipolar disorder, with active major depressive episode despite adherence with medication; has hx of remote substance use disorders. Some progress on insomnia.    Dx: bipolar I disorder, mre depressed; anxiety    Treatment Goals:  Specify outcomes written in observable, behavioral terms: prevent recurrences.    Treatment Plan/Recommendations:   Lamotrigine,  fluvoxamine, buspirone for anxiety. Temazepam to 30 mg qhs prn sleep.  Discussed risks, benefits, and alternatives to treatment plan documented above with patient. I answered all patient questions related to this plan and patient expressed understanding and agreement.     Return to Clinic: 4 months    CLINT Soriano MD  Psychiatry  [facility]  [address] , [place] [address]  [phone]

## 2023-05-02 RX ORDER — TEMAZEPAM 15 MG/1
CAPSULE ORAL
Qty: 60 CAPSULE | Refills: 2 | Status: SHIPPED | OUTPATIENT
Start: 2023-05-02 | End: 2023-06-15

## 2023-05-17 ENCOUNTER — TELEPHONE (OUTPATIENT)
Dept: PHARMACY | Facility: CLINIC | Age: 63
End: 2023-05-17
Payer: COMMERCIAL

## 2023-06-01 ENCOUNTER — IMMUNIZATION (OUTPATIENT)
Dept: PRIMARY CARE CLINIC | Facility: CLINIC | Age: 63
End: 2023-06-01
Payer: COMMERCIAL

## 2023-06-01 DIAGNOSIS — Z23 NEED FOR VACCINATION: Primary | ICD-10-CM

## 2023-06-01 PROCEDURE — 91312 COVID-19, MRNA, LNP-S, BIVALENT BOOSTER, PF, 30 MCG/0.3 ML DOSE: CPT | Mod: S$GLB,,, | Performed by: FAMILY MEDICINE

## 2023-06-01 PROCEDURE — 0124A COVID-19, MRNA, LNP-S, BIVALENT BOOSTER, PF, 30 MCG/0.3 ML DOSE: CPT | Mod: S$GLB,,, | Performed by: FAMILY MEDICINE

## 2023-06-01 PROCEDURE — 91312 COVID-19, MRNA, LNP-S, BIVALENT BOOSTER, PF, 30 MCG/0.3 ML DOSE: ICD-10-PCS | Mod: S$GLB,,, | Performed by: FAMILY MEDICINE

## 2023-06-01 PROCEDURE — 0124A COVID-19, MRNA, LNP-S, BIVALENT BOOSTER, PF, 30 MCG/0.3 ML DOSE: ICD-10-PCS | Mod: S$GLB,,, | Performed by: FAMILY MEDICINE

## 2023-06-02 ENCOUNTER — PATIENT MESSAGE (OUTPATIENT)
Dept: PSYCHIATRY | Facility: CLINIC | Age: 63
End: 2023-06-02
Payer: COMMERCIAL

## 2023-06-02 RX ORDER — TEMAZEPAM 22.5 MG/1
22.5 CAPSULE ORAL NIGHTLY PRN
Qty: 30 CAPSULE | Refills: 2 | Status: SHIPPED | OUTPATIENT
Start: 2023-06-02 | End: 2023-06-15

## 2023-06-05 RX ORDER — PANTOPRAZOLE SODIUM 40 MG/1
TABLET, DELAYED RELEASE ORAL
Qty: 90 TABLET | Refills: 2 | Status: SHIPPED | OUTPATIENT
Start: 2023-06-05

## 2023-06-05 NOTE — TELEPHONE ENCOUNTER
Refill Decision Note   June Stevens  is requesting a refill authorization.  Brief Assessment and Rationale for Refill:  Approve     Medication Therapy Plan:         Comments:     Note composed:3:22 PM 06/05/2023             Appointments     Last Visit   2/27/2023 June Kelley MD   Next Visit   Visit date not found June Kelley MD

## 2023-06-05 NOTE — TELEPHONE ENCOUNTER
No care due was identified.  [facility] Embedded Care Due Messages. Reference number: [id number].   6/05/2023 5:56:05 AM CDT

## 2023-06-07 ENCOUNTER — PATIENT MESSAGE (OUTPATIENT)
Dept: PSYCHIATRY | Facility: CLINIC | Age: 63
End: 2023-06-07
Payer: COMMERCIAL

## 2023-06-15 RX ORDER — TEMAZEPAM 7.5 MG/1
CAPSULE ORAL
Qty: 90 CAPSULE | Refills: 2 | Status: SHIPPED | OUTPATIENT
Start: 2023-06-15 | End: 2023-08-11

## 2023-06-16 ENCOUNTER — OFFICE VISIT (OUTPATIENT)
Dept: PSYCHIATRY | Facility: CLINIC | Age: 63
End: 2023-06-16
Payer: COMMERCIAL

## 2023-06-16 ENCOUNTER — PATIENT MESSAGE (OUTPATIENT)
Dept: PSYCHIATRY | Facility: CLINIC | Age: 63
End: 2023-06-16
Payer: COMMERCIAL

## 2023-06-16 DIAGNOSIS — Z63.9 FAMILY DYNAMICS PROBLEM: ICD-10-CM

## 2023-06-16 DIAGNOSIS — F41.1 GAD (GENERALIZED ANXIETY DISORDER): ICD-10-CM

## 2023-06-16 DIAGNOSIS — G47.00 INSOMNIA, UNSPECIFIED TYPE: ICD-10-CM

## 2023-06-16 DIAGNOSIS — F31.30 BIPOLAR DISORDER, MOST RECENT EPISODE DEPRESSED: Primary | ICD-10-CM

## 2023-06-16 PROCEDURE — 90834 PSYTX W PT 45 MINUTES: CPT | Mod: 95,,, | Performed by: SOCIAL WORKER

## 2023-06-16 PROCEDURE — 90834 PR PSYCHOTHERAPY W/PATIENT, 45 MIN: ICD-10-PCS | Mod: 95,,, | Performed by: SOCIAL WORKER

## 2023-06-16 SDOH — SOCIAL DETERMINANTS OF HEALTH (SDOH): PROBLEM RELATED TO PRIMARY SUPPORT GROUP, UNSPECIFIED: Z63.9

## 2023-06-16 NOTE — PROGRESS NOTES
Individual Psychotherapy (PhD/LCSW)    5/3/21    Site:  [facility]       --Via virtual visit with synchronous audio and video.  Patient presented at home, in the [facility].   Each patient to whom medical services by telemedicine is provided is:  (1) informed of the relationship between the physician and patient and the respective role of any other health care provider with respect to management of the patient; and (2) notified that he or she may decline to receive medical services by telemedicine and may withdraw from such care at any time.    Therapeutic Intervention: Met with patient in clinic.  Met with patient. Outpatient - Insight oriented psychotherapy 45 min - CPT code 67587 and Outpatient - Supportive psychotherapy 45 min - CPT Code 80571    Chief complaint/reason for encounter: depression, mood swings, anxiety, grief and loss, and interpersonal     Interval history and content of current session:     63 year old female patient returning for psychotherapy for her bipolar disorder and family and work stress. Previously seen 4/3/23.  Patient changed schedule to a virtual visit, due to unforseen commute challenges; presented from home via virtual visit.  Reported she and partner are packing actively this day, about to leave to [place], to family who will be traveling with them to her trip to [place] and [facility].   Patient talked about differences she perceives between herself and her partner, saying sometimes she is taken aback by the bluntness and directness of her partner, who is from [facility].  Stated her partner then rarely if ever holds a grudge, instead speaking out promptly on concerns and moving on.  Patient endorsed working on efforts to be less sensitive, to take others' confrontations less personally than she has tended to in the past.  Patient denied any si/hi, psychosis, cognitive deficits, or substance abuse. Supportive therapy provided.  Scheduled for follow up 7/10/23.        Treatment plan:  Target symptoms: depression, anxiety , substance abuse, mood swings, grief, relationship tension  Why chosen therapy is appropriate versus another modality: relevant to diagnosis, patient responds to this modality  Outcome monitoring methods: self-report, observation  Therapeutic intervention type: insight oriented psychotherapy, supportive psychotherapy    Risk parameters:  Patient reports no suicidal ideation  Patient reports no homicidal ideation  Patient reports no self-injurious behavior  Patient reports no violent behavior    Verbal deficits: None    Patient's response to intervention:  The patient's response to intervention is accepting.    Progress toward goals and other mental status changes:  The patient's progress toward goals is mixed    Diagnosis:   No diagnosis found.    History of Alcohol use disorder     Plan:  individual psychotherapy and medication management by physician    Return to clinic: as scheduled    Length of Service (minutes): 45

## 2023-07-10 ENCOUNTER — PATIENT MESSAGE (OUTPATIENT)
Dept: PSYCHIATRY | Facility: CLINIC | Age: 63
End: 2023-07-10
Payer: COMMERCIAL

## 2023-08-11 ENCOUNTER — PATIENT MESSAGE (OUTPATIENT)
Dept: PSYCHIATRY | Facility: CLINIC | Age: 63
End: 2023-08-11
Payer: COMMERCIAL

## 2023-08-11 RX ORDER — TEMAZEPAM 7.5 MG/1
CAPSULE ORAL
Qty: 90 CAPSULE | Refills: 0 | Status: CANCELLED | OUTPATIENT
Start: 2023-08-11

## 2023-08-11 RX ORDER — TEMAZEPAM 22.5 MG/1
22.5 CAPSULE ORAL NIGHTLY PRN
Qty: 30 CAPSULE | Refills: 2 | Status: SHIPPED | OUTPATIENT
Start: 2023-08-11 | End: 2023-08-24

## 2023-08-11 NOTE — TELEPHONE ENCOUNTER
Patient has an appt on 8/24/23  Patient states pharmacy want fill the prescription for a 90 day supply

## 2023-08-14 ENCOUNTER — OFFICE VISIT (OUTPATIENT)
Dept: PSYCHIATRY | Facility: CLINIC | Age: 63
End: 2023-08-14
Payer: COMMERCIAL

## 2023-08-14 DIAGNOSIS — F41.1 GAD (GENERALIZED ANXIETY DISORDER): ICD-10-CM

## 2023-08-14 DIAGNOSIS — Z63.9 FAMILY DYNAMICS PROBLEM: ICD-10-CM

## 2023-08-14 DIAGNOSIS — F31.62 BIPOLAR MIXED AFFECTIVE DISORDER, MODERATE: Primary | ICD-10-CM

## 2023-08-14 PROCEDURE — 99999 PR PBB SHADOW E&M-EST. PATIENT-LVL I: ICD-10-PCS | Mod: PBBFAC,,, | Performed by: SOCIAL WORKER

## 2023-08-14 PROCEDURE — 90834 PSYTX W PT 45 MINUTES: CPT | Mod: S$GLB,,, | Performed by: SOCIAL WORKER

## 2023-08-14 PROCEDURE — 99999 PR PBB SHADOW E&M-EST. PATIENT-LVL I: CPT | Mod: PBBFAC,,, | Performed by: SOCIAL WORKER

## 2023-08-14 PROCEDURE — 90834 PR PSYCHOTHERAPY W/PATIENT, 45 MIN: ICD-10-PCS | Mod: S$GLB,,, | Performed by: SOCIAL WORKER

## 2023-08-14 SDOH — SOCIAL DETERMINANTS OF HEALTH (SDOH): PROBLEM RELATED TO PRIMARY SUPPORT GROUP, UNSPECIFIED: Z63.9
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
## 2023-08-18 LAB
CHOLEST SERPL-MSCNC: 211 MG/DL (ref 100–200)
HDL/CHOLESTEROL RATIO: 3.1 %
HDLC SERPL-MCNC: 68 MG/DL
LDLC SERPL CALC-MCNC: 123 MG/DL
TRIGL SERPL-MCNC: 102 MG/DL
VLDLC SERPL-MCNC: 20 MG/DL (ref 5–40)

## 2023-08-22 RX ORDER — FLUVOXAMINE MALEATE 50 MG/1
50 TABLET ORAL NIGHTLY
Qty: 30 TABLET | Refills: 0 | Status: SHIPPED | OUTPATIENT
Start: 2023-08-22 | End: 2023-08-24 | Stop reason: SDUPTHER

## 2023-08-24 ENCOUNTER — OFFICE VISIT (OUTPATIENT)
Dept: PSYCHIATRY | Facility: CLINIC | Age: 63
End: 2023-08-24
Payer: COMMERCIAL

## 2023-08-24 DIAGNOSIS — G47.00 INSOMNIA, UNSPECIFIED TYPE: ICD-10-CM

## 2023-08-24 DIAGNOSIS — F41.9 ANXIETY: ICD-10-CM

## 2023-08-24 DIAGNOSIS — F31.30 BIPOLAR I DISORDER, MOST RECENT EPISODE (OR CURRENT) DEPRESSED: Primary | ICD-10-CM

## 2023-08-24 PROCEDURE — 99214 OFFICE O/P EST MOD 30 MIN: CPT | Mod: 95,,, | Performed by: PSYCHIATRY & NEUROLOGY

## 2023-08-24 PROCEDURE — 99214 PR OFFICE/OUTPT VISIT, EST, LEVL IV, 30-39 MIN: ICD-10-PCS | Mod: 95,,, | Performed by: PSYCHIATRY & NEUROLOGY

## 2023-08-24 RX ORDER — BUSPIRONE HYDROCHLORIDE 30 MG/1
TABLET ORAL
Qty: 180 TABLET | Refills: 0 | Status: SHIPPED | OUTPATIENT
Start: 2023-08-24 | End: 2023-11-30 | Stop reason: SDUPTHER

## 2023-08-24 RX ORDER — MIRTAZAPINE 15 MG/1
15 TABLET, FILM COATED ORAL NIGHTLY PRN
Qty: 30 TABLET | Refills: 2 | Status: SHIPPED | OUTPATIENT
Start: 2023-08-24 | End: 2023-11-27

## 2023-08-24 RX ORDER — FLUVOXAMINE MALEATE 50 MG/1
50 TABLET ORAL NIGHTLY
Qty: 90 TABLET | Refills: 0 | Status: SHIPPED | OUTPATIENT
Start: 2023-08-24 | End: 2023-11-27

## 2023-08-24 RX ORDER — LAMOTRIGINE 150 MG/1
150 TABLET ORAL DAILY
Qty: 90 TABLET | Refills: 0 | Status: SHIPPED | OUTPATIENT
Start: 2023-08-24 | End: 2023-11-30 | Stop reason: SDUPTHER

## 2023-08-24 NOTE — PROGRESS NOTES
"Outpatient Psychiatry Follow-up Visit (MD/NP)    2023    June Stevens, a 63 y.o. female, presenting for follow-up visit. Met with patient.    Reason for Encounter: Chief complaint/reason for encounter: addictive disorder, depression, mood swings, anxiety, behavior and interpersonal.     Interval Hx: Patient seen and interviewed for follow-up, about five months since last visit. This was a VIDEO VISIT. She was at home. Describes ongoing mood symptoms, somewhat attenuated from previously. Describes ongoing problems with sleep, worse with recent unavailability of her regular hypnotic (insurance only covering #15/month). No new symptoms. Improved health with vegan diet. Adherent with her other medication.     Background: Pt is a 58 y/o F who presents for establishment of psychiatric care, previously evaluated by Mr. Junito Costa. From his note: reporting chief complaint of "my partner thought since I am getting medication, I ought probably to have some talk therapy as well." Pt described a history of mood swings & recurrent depression going back to her teenage years, a recurrent struggle with anger & angry outbursts, & poor coping skills, relying too much on binge drinking (less than weekly, but always to a point of heavy intoxication) to blot out anger & anxiety. She described a fragile self-esteem, pattern of defensiveness, heightened irritability, & frequent depressive cycles. She believes she has abandonment issues from her parents having left for [place] abruptly when she was just 4 years old, leaving her & her younger sister with a grandmother. Mom returned alone after 1 year but never fully embodied a parental figure to the pt. Her grandmother, who represented stability,  when the pt was 27. She recalls mood issues going back to Jr High age, age 12 or 13. She said that was when social issues at school started to become more difficult as she & those around her started noticing she wasn't " "conforming to stereotypical feminine norms & came to be suspected of being a lesbian. She described an emotional struggle between her Hinduism beliefs & growing awareness of her same-sex orientation. She said she tried to date boys. She reported a lot of peer rejection, being known as "a alcides lesbian," & she attempted suicide at age 17, stepping out in front of an oncoming car. By age 19 she had been introduced to the bhandari bar scene, which she experienced as a crucial affirmation that she isn't alone, but which met with her mother's disapproval; it took her mother years to become more accepting. She described her current living situation as a split between 2 households, 1 with her partner of 25 years, who is older & "very controlling," & the other with the pt's mother. She reports tension in her primary relationship that involves infidelity on her part back in '09, an affair stemming from a high school reunion. She said her partner now polices her, & she feels resentful but also doesn't want to leave her partner over it. The pt denied any suicidal or homicidal ideation, though endorsing remote history of a suicidal episode as a teenager. Denied any cognitive deficit, psychosis, mood swings, or recreational drug use. Endorsed past struggle with some alcohol abuse, a period of abstinence with AA participation, & now periodic binges. Endorses struggle with explosive anger. Identified therapeutic goals include reduction of depression & anxiety, improved self-esteem, & coping skills, including anger management, assertiveness communication skills.        Symptoms:   Mood: depressed mood, diminished interest and worthlessness/guilt  Anxiety: excessive anxiety/worry, restlessness/keyed up and irritability  Substance abuse: substance tolerance and take more than intended  Cognitive functioning: denied  Health behaviors: some alcohol binges     Psychiatric history: psychotropic management by PCP & prior suicide attempt(s); " "no chemical abuse treatment reported but did attend  for years & had a sponsor; now relapsed with a binge pattern of use. Family history of psychiatric illness: suspects father bipolar (never diagnosed); sister with clinical anxiety. Social history: See history above; oldest of 3; younger brother drowned as an infant. Younger sister is her closest friend. Chaotic childhood; parents were very young; left pt & younger sister with grandmother when she was 4; father never returned; mother came back after 1 year. Grandmother, an aunt, & uncle were parental figures. Pt described growing social tension in school in middle school & high school, related to her budding identity as a masculine lesbian. Reported running away from school & home several times in teens. Attempted suicide at 17. Discovered bhandari bars at 19 found the start of a sort of community. 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 Current committed relationship of the past 25 years; described partner as controling, domineering, but "she takes care of me." Endorsed tension of pt's past infidelity a decade ago. Previous longest relationship was for 5 years. No biological children of her own. Never in a relationship with a man. Loves animals; finds them easier to relate to than people. Currently lives between 2 houses, one with her partner, the other with her mother.       Substance use:   Alcohol: binge pattern of abuse; denied any known health damage from alcohol   Drugs: none   Tobacco: none   Caffeine: average of 2 and a half cups of coffee daily    Pt confirms the above history. Reports that she's been anxious her "entire life"; overriding distress & impairment. Past dx'es include depression, bipolar disorder (some impulsive shopping). Meds through Dr. Bee Landon for many years, but stopped seeing Dr. Zhong's in summer 2019. Found treatment unaffordable. Off medicine for 3-4 months. "had some left but thought it " "wasn't working, it makes me gain weight, & I'm sluggish". Thinks that these things improved off the medication.     "got an ultimatum" to get back onto medication by her partner who patient says has observed that patient is less irritable & agitated on medication (but she doesn't see this herself).     Endorses "yes" to most items on MDQ    Psych Hx: "anger issues" since a kid. "fought a lot", "usually defending other people", "I couldn't verbally say what I felt, so I thought", though rarely disciplinary problems in school. Protected her sister, friends.     Treated with medication by PCP in  or , dx'ed with depression in - by Dr. Zhong. Diagnosis later changed to bipolar disorder, but she doesn't believe this matches her. She's read about borderline personality & thinks that features of this diagnosis better match her understanding of herself. Was mostly off medication after Dr. Landon left private practice.     Went back to Dr. Landon in  when she became available again, has seen her since & has continued on her current regimen. Was seeing a therapist that works with her, Namita Lopez. They recommended a long-term treatment program in [place] at one point. Reports mood problems have led her to be passed up for promotions, advancement. Denies periods of mood elevation that have led to dysfunction.    Family Hx: father   Social Hx: as above. father left at age 6. Wasn't in her life after that, but sought him out at 41, but was disappointed by the relationship because he wanted money from her.   Has a younger sister & younger brother. Brother , drowned in the bathtub.   Lives between two households (between mother's and partner's). Mother is critical.   Master's degree. Works for the state.   No arrests.     Review Of Systems:     GENERAL:  No weight gain or loss  SKIN:  No rashes or lacerations  HEAD:  No headaches  CHEST:  No shortness of breath, hyperventilation or cough  CARDIOVASCULAR:  " No tachycardia or chest pain  ABDOMEN:  No nausea, vomiting, pain, constipation or diarrhea  URINARY:  No frequency, dysuria or sexual dysfunction  ENDOCRINE:  No polydipsia, polyuria  MUSCULOSKELETAL:  No pain or stiffness of the joints  NEUROLOGIC:  No weakness, sensory changes, seizures, confusion, memory loss, tremor or other abnormal movements    Current Evaluation:     Nutritional Screening: Considering the patient's height and weight, medications, medical history and preferences, should a referral be made to the dietitian? no    Constitutional  Vitals:  Most recent vital signs, dated less than 90 days prior to this appointment, were reviewed.       General:  unremarkable, age appropriate     Musculoskeletal  Muscle Strength/Tone:  no tremor, no tic   Gait & Station:  non-ataxic     Psychiatric  Appearance: casually dressed & groomed;   Behavior: calm,   Cooperation: cooperative with assessment  Speech: normal rate, volume, tone  Thought Process: linear, goal-directed  Thought Content: No suicidal or homicidal ideation; no delusions  Affect: anxious  Mood: anxious  Perceptions: No auditory or visual hallucinations  Level of Consciousness: alert throughout interview  Insight: fair  Cognition: Oriented to person, place, time, & situation  Memory: no apparent deficits to general clinical interview; not formally assessed  Attention/Concentration: no apparent deficits to general clinical interview; not formally assessed  Fund of Knowledge: average by vocabulary/education    Laboratory Data  No visits with results within 1 Month(s) from this visit.   Latest known visit with results is:   Lab Visit on 02/27/2023   Component Date Value Ref Range Status    Sodium 02/27/2023 140  136 - 145 mmol/L Final    Potassium 02/27/2023 3.8  3.5 - 5.1 mmol/L Final    Chloride 02/27/2023 105  95 - 110 mmol/L Final    CO2 02/27/2023 23  23 - 29 mmol/L Final    Glucose 02/27/2023 103  70 - 110 mg/dL Final    BUN 02/27/2023 11  8 - 23  History/Exam/Medical decision making mg/dL Final    Creatinine 02/27/2023 1.0  0.5 - 1.4 mg/dL Final    Calcium 02/27/2023 10.1  8.7 - 10.5 mg/dL Final    Total Protein 02/27/2023 7.9  6.0 - 8.4 g/dL Final    Albumin 02/27/2023 4.2  3.5 - 5.2 g/dL Final    Total Bilirubin 02/27/2023 1.0  0.1 - 1.0 mg/dL Final    Alkaline Phosphatase 02/27/2023 94  55 - 135 U/L Final    AST 02/27/2023 27  10 - 40 U/L Final    ALT 02/27/2023 22  10 - 44 U/L Final    Anion Gap 02/27/2023 12  8 - 16 mmol/L Final    eGFR 02/27/2023 >60.0  >60 mL/min/1.73 m^2 Final    WBC 02/27/2023 7.98  3.90 - 12.70 K/uL Final    RBC 02/27/2023 5.13  4.00 - 5.40 M/uL Final    Hemoglobin 02/27/2023 14.4  12.0 - 16.0 g/dL Final    Hematocrit 02/27/2023 45.7  37.0 - 48.5 % Final    MCV 02/27/2023 89  82 - 98 fL Final    MCH 02/27/2023 28.1  27.0 - 31.0 pg Final    MCHC 02/27/2023 31.5 (L)  32.0 - 36.0 g/dL Final    RDW 02/27/2023 13.7  11.5 - 14.5 % Final    Platelets 02/27/2023 429  150 - 450 K/uL Final    MPV 02/27/2023 10.6  9.2 - 12.9 fL Final    Immature Granulocytes 02/27/2023 0.4  0.0 - 0.5 % Final    Gran # (ANC) 02/27/2023 4.7  1.8 - 7.7 K/uL Final    Immature Grans (Abs) 02/27/2023 0.03  0.00 - 0.04 K/uL Final    Lymph # 02/27/2023 2.1  1.0 - 4.8 K/uL Final    Mono # 02/27/2023 0.9  0.3 - 1.0 K/uL Final    Eos # 02/27/2023 0.2  0.0 - 0.5 K/uL Final    Baso # 02/27/2023 0.09  0.00 - 0.20 K/uL Final    nRBC 02/27/2023 0  0 /100 WBC Final    Gran % 02/27/2023 58.4  38.0 - 73.0 % Final    Lymph % 02/27/2023 26.6  18.0 - 48.0 % Final    Mono % 02/27/2023 11.2  4.0 - 15.0 % Final    Eosinophil % 02/27/2023 2.3  0.0 - 8.0 % Final    Basophil % 02/27/2023 1.1  0.0 - 1.9 % Final    Differential Method 02/27/2023 Automated   Final    Cholesterol 02/27/2023 209 (H)  120 - 199 mg/dL Final    Triglycerides 02/27/2023 168 (H)  30 - 150 mg/dL Final    HDL 02/27/2023 49  40 - 75 mg/dL Final    LDL Cholesterol 02/27/2023 126.4  63.0 - 159.0 mg/dL Final    HDL/Cholesterol Ratio 02/27/2023 23.4  20.0  - 50.0 % Final    Total Cholesterol/HDL Ratio 02/27/2023 4.3  2.0 - 5.0 Final    Non-HDL Cholesterol 02/27/2023 160  mg/dL Final    TSH 02/27/2023 3.476  0.400 - 4.000 uIU/mL Final    Vitamin B-12 02/27/2023 1488 (H)  210 - 950 pg/mL Final     Medications  Outpatient Encounter Medications as of 8/24/2023   Medication Sig Dispense Refill    busPIRone (BUSPAR) 30 MG Tab TAKE 1 TABLET(30 MG) BY MOUTH TWICE DAILY 180 tablet 0    clotrimazole-betamethasone 1-0.05% (LOTRISONE) cream Apply topically 2 (two) times daily. 1 each 1    fluvoxaMINE (LUVOX) 50 MG Tab tablet TAKE 1 TABLET(50 MG) BY MOUTH EVERY EVENING 30 tablet 0    ibuprofen (ADVIL,MOTRIN) 800 MG tablet Take 1 tablet (800 mg total) by mouth 3 (three) times daily as needed. 60 tablet 0    lamoTRIgine (LAMICTAL) 150 MG Tab Take 1 tablet (150 mg total) by mouth once daily. 30 tablet 2    pantoprazole (PROTONIX) 40 MG tablet TAKE 1 TABLET BY MOUTH EVERY MORNING 30 MINUTES BEFORE EATING MEAL 90 tablet 2    temazepam (RESTORIL) 22.5 MG capsule Take 1 capsule (22.5 mg total) by mouth nightly as needed for Insomnia. 30 capsule 2    valACYclovir (VALTREX) 1000 MG tablet Take 2 tablets (2,000 mg total) by mouth every 12 (twelve) hours. 4 tablet 0    [DISCONTINUED] fluvoxaMINE (LUVOX) 50 MG Tab tablet Take 1 tablet (50 mg total) by mouth every evening. 30 tablet 2    [DISCONTINUED] temazepam (RESTORIL) 7.5 MG Cap Take 2 to 3 capsules daily as needed for sleep. 90 capsule 2     No facility-administered encounter medications on file as of 8/24/2023.     Assessment - Diagnosis - Goals:     Impression: Patient is a 62 y/o F with chronic bipolar disorder, with active major depressive episode despite adherence with medication; has hx of remote substance use disorders. Some progress on insomnia when has regular medication.     Dx: bipolar I disorder, mre depressed; anxiety    Treatment Goals:  Specify outcomes written in observable, behavioral terms: prevent  recurrences.    Treatment Plan/Recommendations:   Lamotrigine, fluvoxamine, buspirone for anxiety. mirtazapine qhs prn sleep.  Discussed risks, benefits, and alternatives to treatment plan documented above with patient. I answered all patient questions related to this plan and patient expressed understanding and agreement.     Return to Clinic: 4 months    CLINT Soriano MD  Psychiatry  [facility]  [address] , [place], [place] [id number] [phone]         History/Exam/Medical decision making

## 2023-08-25 ENCOUNTER — PATIENT OUTREACH (OUTPATIENT)
Dept: ADMINISTRATIVE | Facility: HOSPITAL | Age: 63
End: 2023-08-25
Payer: COMMERCIAL

## 2023-09-19 ENCOUNTER — PATIENT MESSAGE (OUTPATIENT)
Dept: FAMILY MEDICINE | Facility: CLINIC | Age: 63
End: 2023-09-19
Payer: COMMERCIAL

## 2023-09-21 ENCOUNTER — OFFICE VISIT (OUTPATIENT)
Dept: FAMILY MEDICINE | Facility: CLINIC | Age: 63
End: 2023-09-21
Payer: COMMERCIAL

## 2023-09-21 VITALS
BODY MASS INDEX: 29.84 KG/M2 | DIASTOLIC BLOOD PRESSURE: 80 MMHG | RESPIRATION RATE: 13 BRPM | SYSTOLIC BLOOD PRESSURE: 132 MMHG | OXYGEN SATURATION: 97 % | WEIGHT: 190.13 LBS | TEMPERATURE: 99 F | HEART RATE: 95 BPM | HEIGHT: 67 IN

## 2023-09-21 DIAGNOSIS — F31.30 BIPOLAR AFFECTIVE DISORDER, CURRENT EPISODE DEPRESSED, CURRENT EPISODE SEVERITY UNSPECIFIED: ICD-10-CM

## 2023-09-21 DIAGNOSIS — N95.0 POSTMENOPAUSAL BLEEDING: Primary | ICD-10-CM

## 2023-09-21 PROCEDURE — 3075F PR MOST RECENT SYSTOLIC BLOOD PRESS GE 130-139MM HG: ICD-10-PCS | Mod: CPTII,S$GLB,, | Performed by: FAMILY MEDICINE

## 2023-09-21 PROCEDURE — 3008F PR BODY MASS INDEX (BMI) DOCUMENTED: ICD-10-PCS | Mod: CPTII,S$GLB,, | Performed by: FAMILY MEDICINE

## 2023-09-21 PROCEDURE — 99999 PR PBB SHADOW E&M-EST. PATIENT-LVL V: CPT | Mod: PBBFAC,,, | Performed by: FAMILY MEDICINE

## 2023-09-21 PROCEDURE — 3075F SYST BP GE 130 - 139MM HG: CPT | Mod: CPTII,S$GLB,, | Performed by: FAMILY MEDICINE

## 2023-09-21 PROCEDURE — 99999 PR PBB SHADOW E&M-EST. PATIENT-LVL V: ICD-10-PCS | Mod: PBBFAC,,, | Performed by: FAMILY MEDICINE

## 2023-09-21 PROCEDURE — 99214 PR OFFICE/OUTPT VISIT, EST, LEVL IV, 30-39 MIN: ICD-10-PCS | Mod: S$GLB,,, | Performed by: FAMILY MEDICINE

## 2023-09-21 PROCEDURE — 1160F PR REVIEW ALL MEDS BY PRESCRIBER/CLIN PHARMACIST DOCUMENTED: ICD-10-PCS | Mod: CPTII,S$GLB,, | Performed by: FAMILY MEDICINE

## 2023-09-21 PROCEDURE — 1159F PR MEDICATION LIST DOCUMENTED IN MEDICAL RECORD: ICD-10-PCS | Mod: CPTII,S$GLB,, | Performed by: FAMILY MEDICINE

## 2023-09-21 PROCEDURE — 1160F RVW MEDS BY RX/DR IN RCRD: CPT | Mod: CPTII,S$GLB,, | Performed by: FAMILY MEDICINE

## 2023-09-21 PROCEDURE — 3008F BODY MASS INDEX DOCD: CPT | Mod: CPTII,S$GLB,, | Performed by: FAMILY MEDICINE

## 2023-09-21 PROCEDURE — 99214 OFFICE O/P EST MOD 30 MIN: CPT | Mod: S$GLB,,, | Performed by: FAMILY MEDICINE

## 2023-09-21 PROCEDURE — 3079F PR MOST RECENT DIASTOLIC BLOOD PRESSURE 80-89 MM HG: ICD-10-PCS | Mod: CPTII,S$GLB,, | Performed by: FAMILY MEDICINE

## 2023-09-21 PROCEDURE — 3079F DIAST BP 80-89 MM HG: CPT | Mod: CPTII,S$GLB,, | Performed by: FAMILY MEDICINE

## 2023-09-21 PROCEDURE — 1159F MED LIST DOCD IN RCRD: CPT | Mod: CPTII,S$GLB,, | Performed by: FAMILY MEDICINE

## 2023-09-21 NOTE — PROGRESS NOTES
CHIEF COMPLAINT:  This is a 63-year-old female complaining of vaginal bleeding.    SUBJECTIVE:  Patient complains of onset of vaginal bleeding one-week ago.  She describes mostly spotting and blood on toilet tissue when wiping.  The most bleeding she had was a quarter-size spot of blood on panty liner.  She has associated intermittent uterine cramping.  Patient has had no menstrual bleeding since age 51.  Patient reports the addition of Remeron to her medication regimen for bipolar disorder which includes take Luvox, Lamictal and BuSpar.  She denies any recent steroid use.  Patient reports increase in symptoms of mood disorder which required urgent visit with mental health provider    The patient has a history of GERD but no longer takes PPI due to weight loss.  She is overweight with a BMI of 29.60.    ROS:  GENERAL: Patient denies fever, chills, night sweats. Patient denies weight loss.  Patient denies anorexia, fatigue, weakness or swollen glands.  SKIN: Patient denies rash.  LUNGS: Patient denies wheeze or hemoptysis.  CARDIOVASCULAR: Patient denies chest pain, shortness of breath, palpitations, syncope or lower extremity edema.  GI: Patient denies abdominal pain, nausea, vomiting, diarrhea, constipation, blood in stool or melena.  GENITOURINARY: Patient denies pelvic pain, vaginal discharge, itch or odor.  Patient denies dysuria, frequency, hematuria, nocturia, urgency or incontinence.  Positive for irregular vaginal bleeding.  BREASTS: Patient denies breast pain, mass or nipple discharge.  MUSCULOSKELETAL: Patient denies joint pain, swelling, redness or warmth.  NEUROLOGIC: Patient denies headache, vertigo, paresthesias, weakness in limb, dysarthria, dysphagia or abnormality of gait.  PSYCHIATRIC:  Positive for dysphoric mood.     OBJECTIVE:   GENERAL: Well-developed well-nourished overweight white female alert and oriented x3, in no acute distress. Memory, judgment and cognition without deficit.  Accompanied  by significant other.  SKIN: Clear without rash. Normal color and tone.   HEENT: Eyes: Clear conjunctivae. No scleral icterus.   NECK: Supple, normal range of motion.   LUNGS: Clear to auscultation. Normal respiratory effort.  CARDIOVASCULAR: Regular rhythm, normal S1, S2 without murmur, gallop or rub.  BACK: No CVA or spinal tenderness.  ABDOMEN: Normal appearance. Active bowel sounds. Soft, nontender without mass or organomegaly. No rebound or guarding.  EXTREMITIES: Without cyanosis, clubbing or edema. Distal pulses 2+ and equal. Normal range of motion in all extremities. No joint effusion, erythema or warmth.  NEUROLOGIC: Gait without abnormality. No tremor.   PELVIC: External:  Clear without rash or inflammation.  Vaginal:  Atrophic changes.  Cervix:  Small amount of blood exiting os.  No motion tenderness.  Uterus: Normal size and shape. Adnexa: Non-tender, without masses.     ASSESSMENT:  1. Postmenopausal bleeding    2. Bipolar affective disorder, current episode depressed, current episode severity unspecified      PLAN:   1. Pelvic ultrasound.  2. Follow-up once results reviewed.    30 minutes of total time spent on the encounter, which includes face to face time and non-face to face time preparing to see the patient (eg, review of tests), Obtaining and/or reviewing separately obtained history, Documenting clinical information in the electronic or other health record, Independently interpreting results (not separately reported) and communicating results to the patient/family/caregiver, or Care coordination (not separately reported).     This note is generated with speech recognition software and is subject to transcription error and sound alike phrases that may be missed by proofreading.

## 2023-10-02 ENCOUNTER — HOSPITAL ENCOUNTER (OUTPATIENT)
Dept: RADIOLOGY | Facility: HOSPITAL | Age: 63
Discharge: HOME | End: 2023-10-02
Attending: FAMILY MEDICINE
Payer: COMMERCIAL

## 2023-10-02 DIAGNOSIS — N95.0 POSTMENOPAUSAL BLEEDING: ICD-10-CM

## 2023-10-02 PROCEDURE — 76830 TRANSVAGINAL US NON-OB: CPT | Mod: 26,,, | Performed by: RADIOLOGY

## 2023-10-02 PROCEDURE — 76856 US PELVIS COMP WITH TRANSVAG NON-OB (XPD): ICD-10-PCS | Mod: 26,,, | Performed by: RADIOLOGY

## 2023-10-02 PROCEDURE — 76856 US EXAM PELVIC COMPLETE: CPT | Mod: TC

## 2023-10-02 PROCEDURE — 76856 US EXAM PELVIC COMPLETE: CPT | Mod: 26,,, | Performed by: RADIOLOGY

## 2023-10-02 PROCEDURE — 76830 US PELVIS COMP WITH TRANSVAG NON-OB (XPD): ICD-10-PCS | Mod: 26,,, | Performed by: RADIOLOGY

## 2023-10-03 ENCOUNTER — PATIENT MESSAGE (OUTPATIENT)
Dept: FAMILY MEDICINE | Facility: CLINIC | Age: 63
End: 2023-10-03
Payer: COMMERCIAL

## 2023-10-03 DIAGNOSIS — N95.0 POSTMENOPAUSAL BLEEDING: Primary | ICD-10-CM

## 2023-10-03 DIAGNOSIS — R93.89 ABNORMAL PELVIC ULTRASOUND: ICD-10-CM

## 2023-10-04 ENCOUNTER — PATIENT MESSAGE (OUTPATIENT)
Dept: PSYCHIATRY | Facility: CLINIC | Age: 63
End: 2023-10-04
Payer: COMMERCIAL

## 2023-10-10 ENCOUNTER — OFFICE VISIT (OUTPATIENT)
Dept: PSYCHIATRY | Facility: CLINIC | Age: 63
End: 2023-10-10
Payer: COMMERCIAL

## 2023-10-10 DIAGNOSIS — F41.9 ANXIETY: ICD-10-CM

## 2023-10-10 DIAGNOSIS — F31.30 BIPOLAR I DISORDER, MOST RECENT EPISODE (OR CURRENT) DEPRESSED: Primary | ICD-10-CM

## 2023-10-10 DIAGNOSIS — Z63.9 FAMILY CIRCUMSTANCE: ICD-10-CM

## 2023-10-10 PROCEDURE — 90834 PR PSYCHOTHERAPY W/PATIENT, 45 MIN: ICD-10-PCS | Mod: 95,,, | Performed by: SOCIAL WORKER

## 2023-10-10 PROCEDURE — 90834 PSYTX W PT 45 MINUTES: CPT | Mod: 95,,, | Performed by: SOCIAL WORKER

## 2023-10-10 SDOH — SOCIAL DETERMINANTS OF HEALTH (SDOH): PROBLEM RELATED TO PRIMARY SUPPORT GROUP, UNSPECIFIED: Z63.9
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
## 2023-10-11 ENCOUNTER — OFFICE VISIT (OUTPATIENT)
Dept: OBSTETRICS AND GYNECOLOGY | Facility: CLINIC | Age: 63
End: 2023-10-11
Payer: COMMERCIAL

## 2023-10-11 VITALS
DIASTOLIC BLOOD PRESSURE: 72 MMHG | SYSTOLIC BLOOD PRESSURE: 128 MMHG | WEIGHT: 198.44 LBS | BODY MASS INDEX: 30.89 KG/M2

## 2023-10-11 DIAGNOSIS — N83.209 CYST OF OVARY, UNSPECIFIED LATERALITY: ICD-10-CM

## 2023-10-11 DIAGNOSIS — Z78.0 POSTMENOPAUSAL: Primary | ICD-10-CM

## 2023-10-11 DIAGNOSIS — Z12.4 PAPANICOLAOU SMEAR FOR CERVICAL CANCER SCREENING: ICD-10-CM

## 2023-10-11 PROCEDURE — 99999 PR PBB SHADOW E&M-EST. PATIENT-LVL III: ICD-10-PCS | Mod: PBBFAC,,, | Performed by: NURSE PRACTITIONER

## 2023-10-11 PROCEDURE — 99203 OFFICE O/P NEW LOW 30 MIN: CPT | Mod: S$GLB,,, | Performed by: NURSE PRACTITIONER

## 2023-10-11 PROCEDURE — 87624 HPV HI-RISK TYP POOLED RSLT: CPT | Performed by: NURSE PRACTITIONER

## 2023-10-11 PROCEDURE — 3074F SYST BP LT 130 MM HG: CPT | Mod: CPTII,S$GLB,, | Performed by: NURSE PRACTITIONER

## 2023-10-11 PROCEDURE — 3078F PR MOST RECENT DIASTOLIC BLOOD PRESSURE < 80 MM HG: ICD-10-PCS | Mod: CPTII,S$GLB,, | Performed by: NURSE PRACTITIONER

## 2023-10-11 PROCEDURE — 3078F DIAST BP <80 MM HG: CPT | Mod: CPTII,S$GLB,, | Performed by: NURSE PRACTITIONER

## 2023-10-11 PROCEDURE — 3074F PR MOST RECENT SYSTOLIC BLOOD PRESSURE < 130 MM HG: ICD-10-PCS | Mod: CPTII,S$GLB,, | Performed by: NURSE PRACTITIONER

## 2023-10-11 PROCEDURE — 3008F PR BODY MASS INDEX (BMI) DOCUMENTED: ICD-10-PCS | Mod: CPTII,S$GLB,, | Performed by: NURSE PRACTITIONER

## 2023-10-11 PROCEDURE — 1160F RVW MEDS BY RX/DR IN RCRD: CPT | Mod: CPTII,S$GLB,, | Performed by: NURSE PRACTITIONER

## 2023-10-11 PROCEDURE — 99999 PR PBB SHADOW E&M-EST. PATIENT-LVL III: CPT | Mod: PBBFAC,,, | Performed by: NURSE PRACTITIONER

## 2023-10-11 PROCEDURE — 1160F PR REVIEW ALL MEDS BY PRESCRIBER/CLIN PHARMACIST DOCUMENTED: ICD-10-PCS | Mod: CPTII,S$GLB,, | Performed by: NURSE PRACTITIONER

## 2023-10-11 PROCEDURE — 88175 CYTOPATH C/V AUTO FLUID REDO: CPT | Performed by: NURSE PRACTITIONER

## 2023-10-11 PROCEDURE — 1159F PR MEDICATION LIST DOCUMENTED IN MEDICAL RECORD: ICD-10-PCS | Mod: CPTII,S$GLB,, | Performed by: NURSE PRACTITIONER

## 2023-10-11 PROCEDURE — 3008F BODY MASS INDEX DOCD: CPT | Mod: CPTII,S$GLB,, | Performed by: NURSE PRACTITIONER

## 2023-10-11 PROCEDURE — 1159F MED LIST DOCD IN RCRD: CPT | Mod: CPTII,S$GLB,, | Performed by: NURSE PRACTITIONER

## 2023-10-11 PROCEDURE — 99203 PR OFFICE/OUTPT VISIT, NEW, LEVL III, 30-44 MIN: ICD-10-PCS | Mod: S$GLB,,, | Performed by: NURSE PRACTITIONER

## 2023-10-11 NOTE — PROGRESS NOTES
Subjective:       Patient ID: June Stevens is a 63 y.o. female.    Chief Complaint:  Ovarian Cyst    Patient's last menstrual period was 2010 (approximate).  History of Present Illness  Referred to GYN by PCP for postmenopausal bleeding  She would wipe and see small amounts of blood on the tissue and she saw blood in her underwear twice each time was a small amount. This happened for 1-2 weeks. Bleeding stopped over a month ago.      OB History    Para Term  AB Living       0         SAB IAB Ectopic Multiple Live Births                   Review of Systems  Review of Systems        Objective:    Physical Exam      Assessment:     1. Postmenopausal    2. Papanicolaou smear for cervical cancer screening    3. Cyst of ovary, unspecified laterality              Plan:   June was seen today for ovarian cyst.    Diagnoses and all orders for this visit:    Postmenopausal  -     CBC Auto Differential; Future  -     TSH; Future    Papanicolaou smear for cervical cancer screening  -     Liquid-Based Pap Smear, Screening  -     HPV High Risk Genotypes, PCR    Cyst of ovary, unspecified laterality  -     US Pelvis Complete Non OB; Future      Refer to GYN MD for further evaluation of Postmenopausal Bleeding   EMB pending discussion with GYN MD

## 2023-10-12 ENCOUNTER — LAB VISIT (OUTPATIENT)
Dept: LAB | Facility: HOSPITAL | Age: 63
End: 2023-10-12
Attending: NURSE PRACTITIONER
Payer: COMMERCIAL

## 2023-10-12 DIAGNOSIS — Z78.0 POSTMENOPAUSAL: ICD-10-CM

## 2023-10-12 LAB
BASOPHILS # BLD AUTO: 0.05 K/UL (ref 0–0.2)
BASOPHILS NFR BLD: 1 % (ref 0–1.9)
DIFFERENTIAL METHOD: ABNORMAL
EOSINOPHIL # BLD AUTO: 0.2 K/UL (ref 0–0.5)
EOSINOPHIL NFR BLD: 4.5 % (ref 0–8)
ERYTHROCYTE [DISTWIDTH] IN BLOOD BY AUTOMATED COUNT: 14.3 % (ref 11.5–14.5)
HCT VFR BLD AUTO: 38.7 % (ref 37–48.5)
HGB BLD-MCNC: 12.1 G/DL (ref 12–16)
IMM GRANULOCYTES # BLD AUTO: 0.01 K/UL (ref 0–0.04)
IMM GRANULOCYTES NFR BLD AUTO: 0.2 % (ref 0–0.5)
LYMPHOCYTES # BLD AUTO: 1.7 K/UL (ref 1–4.8)
LYMPHOCYTES NFR BLD: 35.7 % (ref 18–48)
MCH RBC QN AUTO: 29.2 PG (ref 27–31)
MCHC RBC AUTO-ENTMCNC: 31.3 G/DL (ref 32–36)
MCV RBC AUTO: 93 FL (ref 82–98)
MONOCYTES # BLD AUTO: 0.5 K/UL (ref 0.3–1)
MONOCYTES NFR BLD: 9.5 % (ref 4–15)
NEUTROPHILS # BLD AUTO: 2.4 K/UL (ref 1.8–7.7)
NEUTROPHILS NFR BLD: 49.1 % (ref 38–73)
NRBC BLD-RTO: 0 /100 WBC
PLATELET # BLD AUTO: 306 K/UL (ref 150–450)
PMV BLD AUTO: 10.6 FL (ref 9.2–12.9)
RBC # BLD AUTO: 4.15 M/UL (ref 4–5.4)
TSH SERPL DL<=0.005 MIU/L-ACNC: 1.29 UIU/ML (ref 0.4–4)
WBC # BLD AUTO: 4.85 K/UL (ref 3.9–12.7)

## 2023-10-12 PROCEDURE — 84443 ASSAY THYROID STIM HORMONE: CPT | Performed by: NURSE PRACTITIONER

## 2023-10-12 PROCEDURE — 36415 COLL VENOUS BLD VENIPUNCTURE: CPT | Performed by: NURSE PRACTITIONER

## 2023-10-12 PROCEDURE — 85025 COMPLETE CBC W/AUTO DIFF WBC: CPT | Performed by: NURSE PRACTITIONER

## 2023-10-16 LAB
FINAL PATHOLOGIC DIAGNOSIS: NORMAL
Lab: NORMAL

## 2023-10-26 ENCOUNTER — IMMUNIZATION (OUTPATIENT)
Dept: INTERNAL MEDICINE | Facility: CLINIC | Age: 63
End: 2023-10-26
Payer: COMMERCIAL

## 2023-10-26 PROCEDURE — 90686 FLU VACCINE (QUAD) GREATER THAN OR EQUAL TO 3YO PRESERVATIVE FREE IM: ICD-10-PCS | Mod: S$GLB,,, | Performed by: INTERNAL MEDICINE

## 2023-10-26 PROCEDURE — 90686 IIV4 VACC NO PRSV 0.5 ML IM: CPT | Mod: S$GLB,,, | Performed by: INTERNAL MEDICINE

## 2023-10-26 PROCEDURE — 90471 IMMUNIZATION ADMIN: CPT | Mod: S$GLB,,, | Performed by: INTERNAL MEDICINE

## 2023-10-26 PROCEDURE — 90471 FLU VACCINE (QUAD) GREATER THAN OR EQUAL TO 3YO PRESERVATIVE FREE IM: ICD-10-PCS | Mod: S$GLB,,, | Performed by: INTERNAL MEDICINE

## 2023-11-11 ENCOUNTER — PATIENT MESSAGE (OUTPATIENT)
Dept: PSYCHIATRY | Facility: CLINIC | Age: 63
End: 2023-11-11
Payer: COMMERCIAL

## 2023-11-17 ENCOUNTER — OFFICE VISIT (OUTPATIENT)
Dept: PSYCHIATRY | Facility: CLINIC | Age: 63
End: 2023-11-17
Payer: COMMERCIAL

## 2023-11-17 DIAGNOSIS — G47.00 INSOMNIA, UNSPECIFIED TYPE: ICD-10-CM

## 2023-11-17 DIAGNOSIS — Z63.9 FAMILY CIRCUMSTANCE: ICD-10-CM

## 2023-11-17 DIAGNOSIS — F41.9 ANXIETY: ICD-10-CM

## 2023-11-17 DIAGNOSIS — F31.30 BIPOLAR I DISORDER, MOST RECENT EPISODE (OR CURRENT) DEPRESSED: Primary | ICD-10-CM

## 2023-11-17 PROCEDURE — 90834 PR PSYCHOTHERAPY W/PATIENT, 45 MIN: ICD-10-PCS | Mod: 95,,, | Performed by: SOCIAL WORKER

## 2023-11-17 PROCEDURE — 90834 PSYTX W PT 45 MINUTES: CPT | Mod: 95,,, | Performed by: SOCIAL WORKER

## 2023-11-17 SDOH — SOCIAL DETERMINANTS OF HEALTH (SDOH): PROBLEM RELATED TO PRIMARY SUPPORT GROUP, UNSPECIFIED: Z63.9

## 2023-11-17 NOTE — PROGRESS NOTES
`    Individual Psychotherapy (PhD/LCSW)    5/3/21    Site:  [facility]      --Via virtual visit with synchronous audio and video.  Patient presented at home, in the [facility].   Each patient to whom medical services by telemedicine is provided is:  (1) informed of the relationship between the physician and patient and the respective role of any other health care provider with respect to management of the patient; and (2) notified that he or she may decline to receive medical services by telemedicine and may withdraw from such care at any time.    Therapeutic Intervention: Met with patient in clinic.  Met with patient. Outpatient - Insight oriented psychotherapy 45 min - CPT code 31472 and Outpatient - Supportive psychotherapy 45 min - CPT Code 13631    Chief complaint/reason for encounter: depression, mood swings, anxiety, grief and loss, and interpersonal     Interval history and content of current session:  63 year old female patient following up via virtual visit from work. Reported her partner's mother just  a day ago, and so the partner is needing to go out of town, to attend services. Her partner, herself, is still recovering from some unknown acute illness, as she had to go to the emergency room just yesterday, , though she resisted the suggestion for over a day before being convinced. She was severely dehydrated and falling down. Now slowly on the mend; hoping the illness does not interfere with family visitng plans. Patient reported that for herself, she has mixed feelings about family gatherings, often feeling awkward about her own vegan dietary concerns in the face of an entire extended family that does not consider that concern. Talked about a discussion she had with her partner, due to the patient thinking she wants to have a small farm to care fore, complete with animals, and her partner saying she thinks it would actually be good for the patient to learn to handle the reality of  life and death among animals. Patient endorsed feeling relatively calm at present, but mood remains variable.    She denied any current si/hi, psychosis, cognitive deficits, or substance abuse. Supportive therapy provided.  Scheduled for follow up 1/4/24.       Treatment plan:  Target symptoms: depression, anxiety , substance abuse, mood swings, grief, relationship tension  Why chosen therapy is appropriate versus another modality: relevant to diagnosis, patient responds to this modality  Outcome monitoring methods: self-report, observation  Therapeutic intervention type: insight oriented psychotherapy, supportive psychotherapy    Risk parameters:  Patient reports no suicidal ideation  Patient reports no homicidal ideation  Patient reports no self-injurious behavior  Patient reports no violent behavior    Verbal deficits: None    Patient's response to intervention:  The patient's response to intervention is accepting.    Progress toward goals and other mental status changes:  The patient's progress toward goals is mixed    Diagnosis:     ICD-10-CM ICD-9-CM   1. Bipolar I disorder, most recent episode (or current) depressed  F31.30 296.50   2. Anxiety  F41.9 300.00   3. Family circumstance  Z63.9 V61.9   4. Insomnia, unspecified type  G47.00 780.52     History of Alcohol use disorder     Plan:  individual psychotherapy and medication management by physician    Return to clinic: as scheduled    Length of Service (minutes): 45

## 2023-11-27 ENCOUNTER — OFFICE VISIT (OUTPATIENT)
Dept: OBSTETRICS AND GYNECOLOGY | Facility: CLINIC | Age: 63
End: 2023-11-27
Payer: COMMERCIAL

## 2023-11-27 VITALS
HEIGHT: 67 IN | DIASTOLIC BLOOD PRESSURE: 68 MMHG | SYSTOLIC BLOOD PRESSURE: 122 MMHG | WEIGHT: 201.94 LBS | BODY MASS INDEX: 31.7 KG/M2

## 2023-11-27 DIAGNOSIS — N85.9 FLUID IN ENDOMETRIAL CAVITY: ICD-10-CM

## 2023-11-27 DIAGNOSIS — R93.89 ABNORMAL PELVIC ULTRASOUND: ICD-10-CM

## 2023-11-27 DIAGNOSIS — N83.202 CYST OF LEFT OVARY: Primary | ICD-10-CM

## 2023-11-27 DIAGNOSIS — N95.0 POSTMENOPAUSAL BLEEDING: ICD-10-CM

## 2023-11-27 PROCEDURE — 3008F BODY MASS INDEX DOCD: CPT | Mod: CPTII,S$GLB,, | Performed by: OBSTETRICS & GYNECOLOGY

## 2023-11-27 PROCEDURE — 99214 OFFICE O/P EST MOD 30 MIN: CPT | Mod: S$GLB,,, | Performed by: OBSTETRICS & GYNECOLOGY

## 2023-11-27 PROCEDURE — 3074F SYST BP LT 130 MM HG: CPT | Mod: CPTII,S$GLB,, | Performed by: OBSTETRICS & GYNECOLOGY

## 2023-11-27 PROCEDURE — 99999 PR PBB SHADOW E&M-EST. PATIENT-LVL III: ICD-10-PCS | Mod: PBBFAC,,, | Performed by: OBSTETRICS & GYNECOLOGY

## 2023-11-27 PROCEDURE — 99999 PR PBB SHADOW E&M-EST. PATIENT-LVL III: CPT | Mod: PBBFAC,,, | Performed by: OBSTETRICS & GYNECOLOGY

## 2023-11-27 PROCEDURE — 3078F DIAST BP <80 MM HG: CPT | Mod: CPTII,S$GLB,, | Performed by: OBSTETRICS & GYNECOLOGY

## 2023-11-27 PROCEDURE — 1159F MED LIST DOCD IN RCRD: CPT | Mod: CPTII,S$GLB,, | Performed by: OBSTETRICS & GYNECOLOGY

## 2023-11-27 PROCEDURE — 3008F PR BODY MASS INDEX (BMI) DOCUMENTED: ICD-10-PCS | Mod: CPTII,S$GLB,, | Performed by: OBSTETRICS & GYNECOLOGY

## 2023-11-27 PROCEDURE — 99214 PR OFFICE/OUTPT VISIT, EST, LEVL IV, 30-39 MIN: ICD-10-PCS | Mod: S$GLB,,, | Performed by: OBSTETRICS & GYNECOLOGY

## 2023-11-27 PROCEDURE — 3078F PR MOST RECENT DIASTOLIC BLOOD PRESSURE < 80 MM HG: ICD-10-PCS | Mod: CPTII,S$GLB,, | Performed by: OBSTETRICS & GYNECOLOGY

## 2023-11-27 PROCEDURE — 1159F PR MEDICATION LIST DOCUMENTED IN MEDICAL RECORD: ICD-10-PCS | Mod: CPTII,S$GLB,, | Performed by: OBSTETRICS & GYNECOLOGY

## 2023-11-27 PROCEDURE — 3074F PR MOST RECENT SYSTOLIC BLOOD PRESSURE < 130 MM HG: ICD-10-PCS | Mod: CPTII,S$GLB,, | Performed by: OBSTETRICS & GYNECOLOGY

## 2023-11-27 RX ORDER — FLUVOXAMINE MALEATE 50 MG/1
50 TABLET ORAL NIGHTLY
Qty: 90 TABLET | Refills: 0 | Status: SHIPPED | OUTPATIENT
Start: 2023-11-27 | End: 2023-11-30 | Stop reason: SDUPTHER

## 2023-11-27 RX ORDER — MIRTAZAPINE 15 MG/1
TABLET, FILM COATED ORAL
Qty: 30 TABLET | Refills: 0 | Status: SHIPPED | OUTPATIENT
Start: 2023-11-27 | End: 2023-11-30 | Stop reason: SDUPTHER

## 2023-11-27 NOTE — PROGRESS NOTES
Subjective:      Patient ID: June Stevens is a 63 y.o. female.    Chief Complaint:  ultrasound follow-up    History of Present Illness  HPI  Presents to f/u ultrasound report.  Prior to US done 10/2/23, pt had a very short lived episode of light vaginal bleeding.  Bleeding has since resolved.  No pelvic pain.  Pt is , not on HRT.  PCP, Dr. Kelley, saw pt when she was having the bleeding, and noted it came from the cervical os.  Saw Noni Hobson NP with our department who was going to do EMB, but patient was so nervous about the biopsy that Noni advised her to talk to me.    Pelvic ultrasound:  The uterus measures 6.0 cm in length.   The endometrial stripe in this postmenopausal patient measures 3 mm which is within normal limits.  The right ovary measures 2.7 x 1.4 x 1.4 cm.  The left ovary measures 5.1 x 4.4 x 4.6 cm. A simple left ovarian cyst measuring up to 4.3 cm is noted.  Vascular flow demonstrated to both ovaries.  Complex fluid is noted within the endometrial canal possibly representing blood by products.     Last pap: 10/11/23: normal, HPV neg    GYN & OB History  Patient's last menstrual period was 2010 (approximate).   Date of Last Pap: 10/16/2023    OB History    Para Term  AB Living   0 0 0 0 0 0   SAB IAB Ectopic Multiple Live Births   0 0 0 0 0       Review of Systems  Review of Systems       Objective:     Physical Exam:   Constitutional: She is oriented to person, place, and time. She appears well-developed and well-nourished. No distress.             Abdominal: Soft. She exhibits no distension and no mass. There is no abdominal tenderness. There is no rebound and no guarding. Hernia confirmed negative in the right inguinal area and confirmed negative in the left inguinal area.     Genitourinary:    Vagina normal.      Pelvic exam was performed with patient supine.   There is no rash, tenderness, lesion or injury on the right labia. There is no rash, tenderness,  lesion or injury on the left labia. Right adnexum displays no mass, no tenderness and no fullness. Left adnexum displays no mass, no tenderness and no fullness. No erythema,  no vaginal discharge, tenderness, bleeding, rectocele, cystocele or unspecified prolapse of vaginal walls in the vagina.    No foreign body in the vagina.      No signs of injury in the vagina.   Vaginal atrophy noted. Cervix exhibits no motion tenderness, no discharge and no friability. Uterus is not deviated, not enlarged, not fixed and not tender.               Neurological: She is alert and oriented to person, place, and time.     Psychiatric: She has a normal mood and affect.         Assessment:     1. Cyst of left ovary    2. Postmenopausal bleeding    3. Abnormal pelvic ultrasound    4. Fluid in endometrial cavity               Plan:     Diagnoses and all orders for this visit:    Cyst of left ovary  -     US Pelvis Complete Non OB; Future    Postmenopausal bleeding  -     Ambulatory referral/consult to Obstetrics / Gynecology    Abnormal pelvic ultrasound  -     Ambulatory referral/consult to Obstetrics / Gynecology    Fluid in endometrial cavity  -     US Pelvis Complete Non OB; Future     4cm simple ovarian cyst is asymptomatic and likely benign; can follow with repeat ultrasound  Explained that endometrial stripe was very thin on US, and short lived VB episode likely related to atrophy; I suspect that fluid within the endometrial cavity was likely blood.  Reviewed option to do EMB today vs repeat ultrasound (already scheduled 1/22/24).   Pt is very nervous about pain with EMB, and she prefers plan for repeat US with possible EMB if EMS thick and/or if complex fluid collection still present.  RTC 2 months to f/u repeat ultrasound

## 2023-11-30 ENCOUNTER — OFFICE VISIT (OUTPATIENT)
Dept: PSYCHIATRY | Facility: CLINIC | Age: 63
End: 2023-11-30
Payer: COMMERCIAL

## 2023-11-30 DIAGNOSIS — F31.30 BIPOLAR I DISORDER, MOST RECENT EPISODE (OR CURRENT) DEPRESSED: Primary | ICD-10-CM

## 2023-11-30 DIAGNOSIS — F41.9 ANXIETY: ICD-10-CM

## 2023-11-30 DIAGNOSIS — G47.00 INSOMNIA, UNSPECIFIED TYPE: ICD-10-CM

## 2023-11-30 PROCEDURE — 99214 PR OFFICE/OUTPT VISIT, EST, LEVL IV, 30-39 MIN: ICD-10-PCS | Mod: 95,,, | Performed by: PSYCHIATRY & NEUROLOGY

## 2023-11-30 PROCEDURE — 99214 OFFICE O/P EST MOD 30 MIN: CPT | Mod: 95,,, | Performed by: PSYCHIATRY & NEUROLOGY

## 2023-11-30 RX ORDER — FLUVOXAMINE MALEATE 50 MG/1
50 TABLET ORAL NIGHTLY
Qty: 90 TABLET | Refills: 1 | Status: SHIPPED | OUTPATIENT
Start: 2023-11-30

## 2023-11-30 RX ORDER — LAMOTRIGINE 150 MG/1
150 TABLET ORAL DAILY
Qty: 90 TABLET | Refills: 1 | Status: SHIPPED | OUTPATIENT
Start: 2023-11-30 | End: 2024-11-29

## 2023-11-30 RX ORDER — BUSPIRONE HYDROCHLORIDE 30 MG/1
TABLET ORAL
Qty: 180 TABLET | Refills: 1 | Status: SHIPPED | OUTPATIENT
Start: 2023-11-30

## 2023-11-30 RX ORDER — MIRTAZAPINE 15 MG/1
TABLET, FILM COATED ORAL
Qty: 90 TABLET | Refills: 1 | Status: SHIPPED | OUTPATIENT
Start: 2023-11-30

## 2023-11-30 NOTE — PROGRESS NOTES
"Outpatient Psychiatry Follow-up Visit (MD/NP)    2023    June Stevens, a 63 y.o. female, presenting for follow-up visit. Met with patient.    Reason for Encounter: Chief complaint/reason for encounter: addictive disorder, depression, mood swings, anxiety, behavior and interpersonal.     Interval Hx: Patient seen and interviewed for follow-up, about five months since last visit. This was a VIDEO VISIT. She was at home. Describes overall improved mental health symptoms, symptoms ongoing. Ongoing gyn problems. No new health problems. No new medication. Improved mental health symptoms. Nice Thanksgiving with mom and friend.  Adherent with medication. Denies side effects.     Background: Pt is a 60 y/o F who presents for establishment of psychiatric care, previously evaluated by Mr. Junito Costa. From his note: reporting chief complaint of "my partner thought since I am getting medication, I ought probably to have some talk therapy as well." Pt described a history of mood swings & recurrent depression going back to her teenage years, a recurrent struggle with anger & angry outbursts, & poor coping skills, relying too much on binge drinking (less than weekly, but always to a point of heavy intoxication) to blot out anger & anxiety. She described a fragile self-esteem, pattern of defensiveness, heightened irritability, & frequent depressive cycles. She believes she has abandonment issues from her parents having left for [place] abruptly when she was just 4 years old, leaving her & her younger sister with a grandmother. Mom returned alone after 1 year but never fully embodied a parental figure to the pt. Her grandmother, who represented stability,  when the pt was 27. She recalls mood issues going back to Jr High age, age 12 or 13. She said that was when social issues at school started to become more difficult as she & those around her started noticing she wasn't conforming to stereotypical feminine norms " "& came to be suspected of being a lesbian. She described an emotional struggle between her [employer] beliefs & growing awareness of her same-sex orientation. She said she tried to date boys. She reported a lot of peer rejection, being known as "a alcides lesbian," & she attempted suicide at age 17, stepping out in front of an oncoming car. By age 19 she had been introduced to the bhandari bar scene, which she experienced as a crucial affirmation that she isn't alone, but which met with her mother's disapproval; it took her mother years to become more accepting. She described her current living situation as a split between 2 households, 1 with her partner of 25 years, who is older & "very controlling," & the other with the pt's mother. She reports tension in her primary relationship that involves infidelity on her part back in '09, an affair stemming from a high school reunion. She said her partner now polices her, & she feels resentful but also doesn't want to leave her partner over it. The pt denied any suicidal or homicidal ideation, though endorsing remote history of a suicidal episode as a teenager. Denied any cognitive deficit, psychosis, mood swings, or recreational drug use. Endorsed past struggle with some alcohol abuse, a period of abstinence with AA participation, & now periodic binges. Endorses struggle with explosive anger. Identified therapeutic goals include reduction of depression & anxiety, improved self-esteem, & coping skills, including anger management, assertiveness communication skills.        Symptoms:   Mood: depressed mood, diminished interest and worthlessness/guilt  Anxiety: excessive anxiety/worry, restlessness/keyed up and irritability  Substance abuse: substance tolerance and take more than intended  Cognitive functioning: denied  Health behaviors: some alcohol binges     Psychiatric history: psychotropic management by PCP & prior suicide attempt(s); no chemical abuse treatment reported but did " "attend AA for years & had a sponsor; now relapsed with a binge pattern of use. Family history of psychiatric illness: suspects father bipolar (never diagnosed); sister with clinical anxiety. Social history: See history above; oldest of 3; younger brother drowned as an infant. Younger sister is her closest friend. Chaotic childhood; parents were very young; left pt & younger sister with grandmother when she was 4; father never returned; mother came back after 1 year. Grandmother, an aunt, & uncle were parental figures. Pt described growing social tension in school in middle school & high school, related to her budding identity as a masculine lesbian. Reported running away from school & home several times in teens. Attempted suicide at 17. Discovered bhandari bars at 19 found the start of a sort of community. 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 Current committed relationship of the past 25 years; described partner as controling, domineering, but "she takes care of me." Endorsed tension of pt's past infidelity a decade ago. Previous longest relationship was for 5 years. No biological children of her own. Never in a relationship with a man. Loves animals; finds them easier to relate to than people. Currently lives between 2 houses, one with her partner, the other with her mother.       Substance use:   Alcohol: binge pattern of abuse; denied any known health damage from alcohol   Drugs: none   Tobacco: none   Caffeine: average of 2 and a half cups of coffee daily    Pt confirms the above history. Reports that she's been anxious her "entire life"; overriding distress & impairment. Past dx'es include depression, bipolar disorder (some impulsive shopping). Meds through Dr. Bee Landon for many years, but stopped seeing Dr. Zhong's in summer 2019. Found treatment unaffordable. Off medicine for 3-4 months. "had some left but thought it wasn't working, it makes me gain weight, & I'm " "sluggish". Thinks that these things improved off the medication.     "got an ultimatum" to get back onto medication by her partner who patient says has observed that patient is less irritable & agitated on medication (but she doesn't see this herself).     Endorses "yes" to most items on MDQ    Psych Hx: "anger issues" since a kid. "fought a lot", "usually defending other people", "I couldn't verbally say what I felt, so I thought", though rarely disciplinary problems in school. Protected her sister, friends.     Treated with medication by PCP in  or , dx'ed with depression in - by Dr. Zhnog. Diagnosis later changed to bipolar disorder, but she doesn't believe this matches her. She's read about borderline personality & thinks that features of this diagnosis better match her understanding of herself. Was mostly off medication after Dr. Landon left private practice.     Went back to Dr. Landon in  when she became available again, has seen her since & has continued on her current regimen. Was seeing a therapist that works with her, Namita Lopez. They recommended a long-term treatment program in [place] at one point. Reports mood problems have led her to be passed up for promotions, advancement. Denies periods of mood elevation that have led to dysfunction.    Family Hx: father   Social Hx: as above. father left at age 6. Wasn't in her life after that, but sought him out at 41, but was disappointed by the relationship because he wanted money from her.   Has a younger sister & younger brother. Brother , drowned in the bathtub.   Lives between two households (between mother's and partner's). Mother is critical.   Master's degree. Works for the [employer].   No arrests.     Review Of Systems:     GENERAL:  No weight gain or loss  SKIN:  No rashes or lacerations  HEAD:  No headaches  CHEST:  No shortness of breath, hyperventilation or cough  CARDIOVASCULAR:  No tachycardia or chest pain  ABDOMEN:  No " nausea, vomiting, pain, constipation or diarrhea  URINARY:  No frequency, dysuria or sexual dysfunction  ENDOCRINE:  No polydipsia, polyuria  MUSCULOSKELETAL:  No pain or stiffness of the joints  NEUROLOGIC:  No weakness, sensory changes, seizures, confusion, memory loss, tremor or other abnormal movements    Current Evaluation:     Nutritional Screening: Considering the patient's height and weight, medications, medical history and preferences, should a referral be made to the dietitian? no    Constitutional  Vitals:  Most recent vital signs, dated less than 90 days prior to this appointment, were reviewed.       General:  unremarkable, age appropriate     Musculoskeletal  Muscle Strength/Tone:  no tremor, no tic   Gait & Station:  non-ataxic     Psychiatric  Appearance: casually dressed & groomed;   Behavior: calm,   Cooperation: cooperative with assessment  Speech: normal rate, volume, tone  Thought Process: linear, goal-directed  Thought Content: No suicidal or homicidal ideation; no delusions  Affect: anxious  Mood: anxious  Perceptions: No auditory or visual hallucinations  Level of Consciousness: alert throughout interview  Insight: fair  Cognition: Oriented to person, place, time, & situation  Memory: no apparent deficits to general clinical interview; not formally assessed  Attention/Concentration: no apparent deficits to general clinical interview; not formally assessed  Fund of Knowledge: average by vocabulary/education    Laboratory Data  No visits with results within 1 Month(s) from this visit.   Latest known visit with results is:   Lab Visit on 10/12/2023   Component Date Value Ref Range Status    WBC 10/12/2023 4.85  3.90 - 12.70 K/uL Final    RBC 10/12/2023 4.15  4.00 - 5.40 M/uL Final    Hemoglobin 10/12/2023 12.1  12.0 - 16.0 g/dL Final    Hematocrit 10/12/2023 38.7  37.0 - 48.5 % Final    MCV 10/12/2023 93  82 - 98 fL Final    MCH 10/12/2023 29.2  27.0 - 31.0 pg Final    MCHC 10/12/2023 31.3 (L)   32.0 - 36.0 g/dL Final    RDW 10/12/2023 14.3  11.5 - 14.5 % Final    Platelets 10/12/2023 306  150 - 450 K/uL Final    MPV 10/12/2023 10.6  9.2 - 12.9 fL Final    Immature Granulocytes 10/12/2023 0.2  0.0 - 0.5 % Final    Gran # (ANC) 10/12/2023 2.4  1.8 - 7.7 K/uL Final    Immature Grans (Abs) 10/12/2023 0.01  0.00 - 0.04 K/uL Final    Lymph # 10/12/2023 1.7  1.0 - 4.8 K/uL Final    Mono # 10/12/2023 0.5  0.3 - 1.0 K/uL Final    Eos # 10/12/2023 0.2  0.0 - 0.5 K/uL Final    Baso # 10/12/2023 0.05  0.00 - 0.20 K/uL Final    nRBC 10/12/2023 0  0 /100 WBC Final    Gran % 10/12/2023 49.1  38.0 - 73.0 % Final    Lymph % 10/12/2023 35.7  18.0 - 48.0 % Final    Mono % 10/12/2023 9.5  4.0 - 15.0 % Final    Eosinophil % 10/12/2023 4.5  0.0 - 8.0 % Final    Basophil % 10/12/2023 1.0  0.0 - 1.9 % Final    Differential Method 10/12/2023 Automated   Final    TSH 10/12/2023 1.292  0.400 - 4.000 uIU/mL Final     Medications  Outpatient Encounter Medications as of 11/30/2023   Medication Sig Dispense Refill    fluvoxaMINE (LUVOX) 50 MG Tab tablet TAKE 1 TABLET(50 MG) BY MOUTH EVERY EVENING 90 tablet 0    mirtazapine (REMERON) 15 MG tablet TAKE 1 TABLET(15 MG) BY MOUTH EVERY NIGHT AS NEEDED FOR SLEEP 30 tablet 0    busPIRone (BUSPAR) 30 MG Tab TAKE 1 TABLET(30 MG) BY MOUTH TWICE DAILY 180 tablet 0    ibuprofen (ADVIL,MOTRIN) 800 MG tablet Take 1 tablet (800 mg total) by mouth 3 (three) times daily as needed. 60 tablet 0    lamoTRIgine (LAMICTAL) 150 MG Tab Take 1 tablet (150 mg total) by mouth once daily. 90 tablet 0    pantoprazole (PROTONIX) 40 MG tablet TAKE 1 TABLET BY MOUTH EVERY MORNING 30 MINUTES BEFORE EATING MEAL 90 tablet 2    valACYclovir (VALTREX) 1000 MG tablet Take 2 tablets (2,000 mg total) by mouth every 12 (twelve) hours. 4 tablet 0    [DISCONTINUED] fluvoxaMINE (LUVOX) 50 MG Tab tablet Take 1 tablet (50 mg total) by mouth every evening. 90 tablet 0    [DISCONTINUED] mirtazapine (REMERON) 15 MG tablet Take 1  tablet (15 mg total) by mouth nightly as needed (sleep). 30 tablet 2     No facility-administered encounter medications on file as of 11/30/2023.     Assessment - Diagnosis - Goals:     Impression: Patient is a 62 y/o F with chronic bipolar disorder, with active major depressive episode despite adherence with medication; has hx of remote substance use disorders. Some progress on insomnia when has regular medication.     Dx: bipolar I disorder, mre depressed; anxiety    Treatment Goals:  Specify outcomes written in observable, behavioral terms: prevent recurrences.    Treatment Plan/Recommendations:   Lamotrigine, fluvoxamine, buspirone for anxiety. mirtazapine qhs prn sleep.  Discussed risks, benefits, and alternatives to treatment plan documented above with patient. I answered all patient questions related to this plan and patient expressed understanding and agreement.     Return to Clinic: 4 months    CLINT Soriano MD  Psychiatry  [facility]  [address] , [place] [address]  [phone]         O-T Advancement Flap Text: The defect edges were debeveled with a #15 scalpel blade.  Given the location of the defect, shape of the defect and the proximity to free margins an O-T advancement flap was deemed most appropriate.  Using a sterile surgical marker, an appropriate advancement flap was drawn incorporating the defect and placing the expected incisions within the relaxed skin tension lines where possible.    The area thus outlined was incised deep to adipose tissue with a #15 scalpel blade.  The skin margins were undermined to an appropriate distance in all directions utilizing iris scissors.

## 2023-12-04 ENCOUNTER — OFFICE VISIT (OUTPATIENT)
Dept: OPHTHALMOLOGY | Facility: CLINIC | Age: 63
End: 2023-12-04
Payer: COMMERCIAL

## 2023-12-04 ENCOUNTER — PATIENT MESSAGE (OUTPATIENT)
Dept: OPHTHALMOLOGY | Facility: CLINIC | Age: 63
End: 2023-12-04

## 2023-12-04 DIAGNOSIS — Z98.890 HX OF LASIK: ICD-10-CM

## 2023-12-04 DIAGNOSIS — H25.13 CATARACT, NUCLEAR SCLEROTIC SENILE, BILATERAL: Primary | ICD-10-CM

## 2023-12-04 DIAGNOSIS — Z83.518 FAMILY HISTORY OF RETINAL DETACHMENT: ICD-10-CM

## 2023-12-04 DIAGNOSIS — H52.7 REFRACTIVE ERRORS: ICD-10-CM

## 2023-12-04 PROCEDURE — 1159F PR MEDICATION LIST DOCUMENTED IN MEDICAL RECORD: ICD-10-PCS | Mod: CPTII,S$GLB,, | Performed by: OPTOMETRIST

## 2023-12-04 PROCEDURE — 99999 PR PBB SHADOW E&M-EST. PATIENT-LVL II: ICD-10-PCS | Mod: PBBFAC,,, | Performed by: OPTOMETRIST

## 2023-12-04 PROCEDURE — 92014 COMPRE OPH EXAM EST PT 1/>: CPT | Mod: S$GLB,,, | Performed by: OPTOMETRIST

## 2023-12-04 PROCEDURE — 92015 DETERMINE REFRACTIVE STATE: CPT | Mod: S$GLB,,, | Performed by: OPTOMETRIST

## 2023-12-04 PROCEDURE — 99999 PR PBB SHADOW E&M-EST. PATIENT-LVL II: CPT | Mod: PBBFAC,,, | Performed by: OPTOMETRIST

## 2023-12-04 PROCEDURE — 1159F MED LIST DOCD IN RCRD: CPT | Mod: CPTII,S$GLB,, | Performed by: OPTOMETRIST

## 2023-12-04 PROCEDURE — 92014 PR EYE EXAM, EST PATIENT,COMPREHESV: ICD-10-PCS | Mod: S$GLB,,, | Performed by: OPTOMETRIST

## 2023-12-04 PROCEDURE — 92015 PR REFRACTION: ICD-10-PCS | Mod: S$GLB,,, | Performed by: OPTOMETRIST

## 2023-12-04 NOTE — PROGRESS NOTES
HPI     Annual Exam     Additional comments: Pt here for annual eye exam. Pt states her vision is   stable has not noticed any changes. Pt denies flashes and floater.            Comments    Hyperopic overcorrection with LASIK, 2002     Stable LASIK, previous -3.50 ??  Mom had spontaneous RD OS             Last edited by Benton aRiney on 12/4/2023  2:03 PM.            Assessment /Plan     For exam results, see Encounter Report.    Cataract, nuclear sclerotic senile, bilateral    Hx of LASIK    Family history of retinal detachment    Refractive errors      Mild cataracts OU, not surgical.  Follow annually.    Unchanged LASIK    Dispense Final Rx for glasses.  RTC 1 year  Discussed above and answered questions.

## 2024-01-02 ENCOUNTER — TELEPHONE (OUTPATIENT)
Dept: PSYCHIATRY | Facility: CLINIC | Age: 64
End: 2024-01-02
Payer: COMMERCIAL

## 2024-01-02 ENCOUNTER — PATIENT MESSAGE (OUTPATIENT)
Dept: PSYCHIATRY | Facility: CLINIC | Age: 64
End: 2024-01-02
Payer: COMMERCIAL

## 2024-01-04 ENCOUNTER — OFFICE VISIT (OUTPATIENT)
Dept: PSYCHIATRY | Facility: CLINIC | Age: 64
End: 2024-01-04
Payer: COMMERCIAL

## 2024-01-04 DIAGNOSIS — F31.30 BIPOLAR I DISORDER, MOST RECENT EPISODE (OR CURRENT) DEPRESSED: Primary | ICD-10-CM

## 2024-01-04 DIAGNOSIS — F41.9 ANXIETY: ICD-10-CM

## 2024-01-04 DIAGNOSIS — Z63.9 FAMILY DYNAMICS PROBLEM: ICD-10-CM

## 2024-01-04 PROCEDURE — 90834 PSYTX W PT 45 MINUTES: CPT | Mod: 95,,, | Performed by: SOCIAL WORKER

## 2024-01-04 SDOH — SOCIAL DETERMINANTS OF HEALTH (SDOH): PROBLEM RELATED TO PRIMARY SUPPORT GROUP, UNSPECIFIED: Z63.9

## 2024-01-04 NOTE — PROGRESS NOTES
"`    Individual Psychotherapy (PhD/LCSW)    5/3/21    Site:  [place]      --Via virtual visit with synchronous audio and video.  Patient presented at home, in the [facility].   Each patient to whom medical services by telemedicine is provided is:  (1) informed of the relationship between the physician and patient and the respective role of any other health care provider with respect to management of the patient; and (2) notified that he or she may decline to receive medical services by telemedicine and may withdraw from such care at any time.    Therapeutic Intervention: Met with patient in clinic.  Met with patient. Outpatient - Insight oriented psychotherapy 45 min - CPT code 74406 and Outpatient - Supportive psychotherapy 45 min - CPT Code 80501    Chief complaint/reason for encounter: depression, mood swings, anxiety, grief and loss, and interpersonal     Interval history and content of current session:  63 year old female patient following up via virtual visit from home. Previous session was 11/17/23.  Patient reported she had felt good going into the holidays but ended up surprising herself, at experiencing what she calls "a meltdown." She went on to described it as being entirely focused on ongoing friction between herself and her partner's daughter, Mihaela, who was very rude, insulting, and hurtful to the patient in her own home over Thanksgiving. Since then, Mihaela has not apologized, most of the family have distanced themselves from Mihaela, but her birth mother, the patient's partner, did distance herself from her rude daughter, but she did not cut her off entirely and actually "accepted" a backhanded apology from her that, per the patient, was no acknowledgment of wrongdoing. The Patient described herself going into a beer binge-fueled angry texting spree to her partner, went through and destroyed over $1,000 work of the couple's guest liquor stocks, and scrawled ugly messages all over the " couple's home's interior walls with, thankfully, washable marker. Her partner was not home at the time but was visiting the offending Mihaela. The patient later cleaned it all up and apologized to her partner, who quietly accepted it and understands the patient's bipolar pattern history, but the patient still is feeling guilty for her own behavior. We discussed parallels between her own loss of control and things she resents in Mihaela. Discussed value of apology and making amends. Patient  denied any current si/hi, psychosis, cognitive deficits, or substance abuse. Supportive therapy provided.  Scheduled for follow up 2/19/24.       Treatment plan:  Target symptoms: depression, anxiety , substance abuse, mood swings, grief, relationship tension  Why chosen therapy is appropriate versus another modality: relevant to diagnosis, patient responds to this modality  Outcome monitoring methods: self-report, observation  Therapeutic intervention type: insight oriented psychotherapy, supportive psychotherapy    Risk parameters:  Patient reports no suicidal ideation  Patient reports no homicidal ideation  Patient reports no self-injurious behavior  Patient reports no violent behavior    Verbal deficits: None    Patient's response to intervention:  The patient's response to intervention is accepting.    Progress toward goals and other mental status changes:  The patient's progress toward goals is limited    Diagnosis:     ICD-10-CM ICD-9-CM   1. Bipolar I disorder, most recent episode (or current) depressed  F31.30 296.50   2. Anxiety  F41.9 300.00   3. Family dynamics problem  Z63.9 V61.9     History of Alcohol use disorder     Plan:  individual psychotherapy and medication management by physician    Return to clinic: as scheduled    Length of Service (minutes): 52

## 2024-01-23 ENCOUNTER — PATIENT MESSAGE (OUTPATIENT)
Dept: FAMILY MEDICINE | Facility: CLINIC | Age: 64
End: 2024-01-23
Payer: COMMERCIAL

## 2024-02-19 ENCOUNTER — OFFICE VISIT (OUTPATIENT)
Dept: PSYCHIATRY | Facility: CLINIC | Age: 64
End: 2024-02-19
Payer: COMMERCIAL

## 2024-02-19 DIAGNOSIS — F31.62 BIPOLAR MIXED AFFECTIVE DISORDER, MODERATE: Primary | ICD-10-CM

## 2024-02-19 DIAGNOSIS — G47.00 INSOMNIA, UNSPECIFIED TYPE: ICD-10-CM

## 2024-02-19 DIAGNOSIS — Z63.9 FAMILY DYNAMICS PROBLEM: ICD-10-CM

## 2024-02-19 DIAGNOSIS — F41.9 ANXIETY: ICD-10-CM

## 2024-02-19 PROCEDURE — 99999 PR PBB SHADOW E&M-EST. PATIENT-LVL I: CPT | Mod: PBBFAC,,, | Performed by: SOCIAL WORKER

## 2024-02-19 PROCEDURE — 90834 PSYTX W PT 45 MINUTES: CPT | Mod: S$GLB,,, | Performed by: SOCIAL WORKER

## 2024-02-19 SDOH — SOCIAL DETERMINANTS OF HEALTH (SDOH): PROBLEM RELATED TO PRIMARY SUPPORT GROUP, UNSPECIFIED: Z63.9

## 2024-02-19 NOTE — PROGRESS NOTES
`    Individual Psychotherapy (PhD/LCSW)    5/3/21    Site:  [place]         Therapeutic Intervention: Met with patient in clinic.  Met with patient. Outpatient - Insight oriented psychotherapy 45 min - CPT code 14737 and Outpatient - Supportive psychotherapy 45 min - CPT Code 30874    Chief complaint/reason for encounter: depression, mood swings, anxiety, grief and loss, and interpersonal     Interval history and content of current session:  63 year old female patient presented to clinic for follow up of psychotherapy to address bipolar patterns and anxiety issues. Previous session was 1/4/24.  Patient Presented with fair affect, talkative, but not visibly agitated. Reporting work as status quo and talked of skilled nursing plans, still a little way in the future. She then talked about the continuation of her housing stressor, that is paying mortgages on two house but feeling not truly at home in either, because she splits her time every week between the two. Feeling responsible for the dogs she has that reside with her mother, which her mother is not adequately caring for and which the patient took in and so feels responsible for them. Notes her pattern of late has been spending about 4 nights a week at her house where she has her elderly mother living. The other 3 nights are then at the home where the patient's partner is. She described reasons of her work commute for this pattern but also endorsed that the 10 to 15 minutes extra in commute time may be worth not having to lose a sense of a primary home where she sleeps in the same bed nightly.  Patient  denied any current si/hi, psychosis, cognitive deficits, or substance abuse. Supportive therapy provided.  Scheduled for follow up 3/4/24.       Treatment plan:  Target symptoms: depression, anxiety , substance abuse, mood swings, grief, relationship tension  Why chosen therapy is appropriate versus another modality: relevant to diagnosis, patient responds to this  modality  Outcome monitoring methods: self-report, observation  Therapeutic intervention type: insight oriented psychotherapy, supportive psychotherapy    Risk parameters:  Patient reports no suicidal ideation  Patient reports no homicidal ideation  Patient reports no self-injurious behavior  Patient reports no violent behavior    Verbal deficits: None    Patient's response to intervention:  The patient's response to intervention is accepting.    Progress toward goals and other mental status changes:  The patient's progress toward goals is mixed    Diagnosis:     ICD-10-CM ICD-9-CM   1. Bipolar mixed affective disorder, moderate  F31.62 296.62   2. Anxiety  F41.9 300.00   3. Insomnia, unspecified type  G47.00 780.52   4. Family dynamics problem  Z63.9 V61.9     History of Alcohol use disorder     Plan:  individual psychotherapy and medication management by physician    Return to clinic: as scheduled    Length of Service (minutes): 52

## 2024-02-22 ENCOUNTER — PATIENT MESSAGE (OUTPATIENT)
Dept: FAMILY MEDICINE | Facility: CLINIC | Age: 64
End: 2024-02-22
Payer: COMMERCIAL

## 2024-02-23 RX ORDER — CEPHALEXIN 500 MG/1
500 CAPSULE ORAL 2 TIMES DAILY
Qty: 20 CAPSULE | Refills: 0 | Status: SHIPPED | OUTPATIENT
Start: 2024-02-23 | End: 2024-03-04

## 2024-02-29 ENCOUNTER — TELEPHONE (OUTPATIENT)
Dept: PSYCHIATRY | Facility: CLINIC | Age: 64
End: 2024-02-29
Payer: COMMERCIAL

## 2024-03-04 ENCOUNTER — PATIENT MESSAGE (OUTPATIENT)
Dept: FAMILY MEDICINE | Facility: CLINIC | Age: 64
End: 2024-03-04
Payer: COMMERCIAL

## 2024-03-04 ENCOUNTER — TELEPHONE (OUTPATIENT)
Dept: PSYCHIATRY | Facility: CLINIC | Age: 64
End: 2024-03-04
Payer: COMMERCIAL

## 2024-03-04 DIAGNOSIS — G47.30 SLEEP APNEA, UNSPECIFIED TYPE: Primary | ICD-10-CM

## 2024-03-04 NOTE — TELEPHONE ENCOUNTER
----- Message from Collin Strickland sent at 3/4/2024 10:38 AM CST -----  Contact: June Watkins is needing a call back in regards to rescheduling her appt due to having Covid. Please give her a call back at [phone]

## 2024-03-04 NOTE — TELEPHONE ENCOUNTER
----- Message from Jonatan Strickland sent at 3/4/2024  9:35 AM CST -----  Contact: self [phone]  Pt is calling regarding appt/ cancel (due to covid) . Please call back at [phone] . Thanksdj

## 2024-03-28 ENCOUNTER — TELEPHONE (OUTPATIENT)
Dept: PSYCHIATRY | Facility: CLINIC | Age: 64
End: 2024-03-28
Payer: COMMERCIAL

## 2024-04-01 ENCOUNTER — OFFICE VISIT (OUTPATIENT)
Dept: PSYCHIATRY | Facility: CLINIC | Age: 64
End: 2024-04-01
Payer: COMMERCIAL

## 2024-04-01 DIAGNOSIS — F31.62 BIPOLAR MIXED AFFECTIVE DISORDER, MODERATE: Primary | ICD-10-CM

## 2024-04-01 DIAGNOSIS — F41.9 ANXIETY: ICD-10-CM

## 2024-04-01 DIAGNOSIS — Z63.9 FAMILY CIRCUMSTANCE: ICD-10-CM

## 2024-04-01 DIAGNOSIS — G47.00 INSOMNIA, UNSPECIFIED TYPE: ICD-10-CM

## 2024-04-01 PROCEDURE — 90834 PSYTX W PT 45 MINUTES: CPT | Mod: S$GLB,,, | Performed by: SOCIAL WORKER

## 2024-04-01 PROCEDURE — 99999 PR PBB SHADOW E&M-EST. PATIENT-LVL I: CPT | Mod: PBBFAC,,, | Performed by: SOCIAL WORKER

## 2024-04-01 SDOH — SOCIAL DETERMINANTS OF HEALTH (SDOH): PROBLEM RELATED TO PRIMARY SUPPORT GROUP, UNSPECIFIED: Z63.9

## 2024-04-11 NOTE — PROGRESS NOTES
`    Individual Psychotherapy (PhD/LCSW)    5/3/21    Site:  [facility]         Therapeutic Intervention: Met with patient in clinic.  Met with patient. Outpatient - Insight oriented psychotherapy 45 min - CPT code 88544 and Outpatient - Supportive psychotherapy 45 min - CPT Code 86384    Chief complaint/reason for encounter: depression, mood swings, anxiety, grief and loss, and interpersonal     Interval history and content of current session:  Late entry for 4/1/24. 64 year old female patient presented to clinic for follow up of psychotherapy to address bipolar patterns and anxiety issues. Previous session was 2/19/24. The patient presented talkative. Reported a waiting room public encounter with someone she did not know that startled her and reminded her just how political and polarized our nation has become, as a stranger addressed her with pointed comments about political leadership and appeared to presume she would be in agreement with him. She then talked about her mother's mid-March cardiac medical crisis, involving low blood oxygen and a blood clot. Mother now back home with home health. Patient said her own boss was good to set up flexible work-from-home arrangements for the patient, who said she now only has to go into the office a minimal amount each week. Worries for her mother's safety, saying her mother is obstinate, rude and difficult with anyone trying to direct her about medication, nutrition, or other self-care. Discussed reality of mother's condition and limits of the patient's power to fix any of it or make her mother reasonable. Patient  denied any current si/hi, psychosis, cognitive deficits, or substance abuse. Supportive therapy provided.  Scheduled for follow up 5/6/24.       Treatment plan:  Target symptoms: depression, anxiety , substance abuse, mood swings, grief, relationship tension  Why chosen therapy is appropriate versus another modality: relevant to diagnosis, patient responds to  this modality  Outcome monitoring methods: self-report, observation  Therapeutic intervention type: insight oriented psychotherapy, supportive psychotherapy    Risk parameters:  Patient reports no suicidal ideation  Patient reports no homicidal ideation  Patient reports no self-injurious behavior  Patient reports no violent behavior    Verbal deficits: None    Patient's response to intervention:  The patient's response to intervention is accepting.    Progress toward goals and other mental status changes:  The patient's progress toward goals is mixed    Diagnosis:     ICD-10-CM ICD-9-CM   1. Bipolar mixed affective disorder, moderate  F31.62 296.62   2. Anxiety  F41.9 300.00   3. Insomnia, unspecified type  G47.00 780.52   4. Family circumstance  Z63.9 V61.9     History of Alcohol use disorder     Plan:  individual psychotherapy and medication management by physician    Return to clinic: as scheduled    Length of Service (minutes): 50

## 2024-04-16 ENCOUNTER — TELEPHONE (OUTPATIENT)
Dept: PSYCHIATRY | Facility: CLINIC | Age: 64
End: 2024-04-16
Payer: COMMERCIAL

## 2024-04-18 ENCOUNTER — TELEPHONE (OUTPATIENT)
Dept: PSYCHIATRY | Facility: CLINIC | Age: 64
End: 2024-04-18
Payer: COMMERCIAL

## 2024-04-18 NOTE — TELEPHONE ENCOUNTER
----- Message from Catarino Allen sent at 4/18/2024  2:17 PM CDT -----  Contact: June Watkins called to let the office know she will like to cancel her virtual appointment for today 4/18 at 3:30pm. Will reschedule on Suly lucy when ready. Please call her at [phone] if needed  Thanks   Am

## 2024-04-24 DIAGNOSIS — Z12.31 OTHER SCREENING MAMMOGRAM: ICD-10-CM

## 2024-04-30 ENCOUNTER — LAB VISIT (OUTPATIENT)
Dept: LAB | Facility: HOSPITAL | Age: 64
End: 2024-04-30
Attending: FAMILY MEDICINE
Payer: COMMERCIAL

## 2024-04-30 ENCOUNTER — OFFICE VISIT (OUTPATIENT)
Dept: FAMILY MEDICINE | Facility: CLINIC | Age: 64
End: 2024-04-30
Payer: COMMERCIAL

## 2024-04-30 VITALS
SYSTOLIC BLOOD PRESSURE: 120 MMHG | HEART RATE: 84 BPM | WEIGHT: 201.25 LBS | HEIGHT: 68 IN | OXYGEN SATURATION: 97 % | BODY MASS INDEX: 30.5 KG/M2 | DIASTOLIC BLOOD PRESSURE: 78 MMHG | TEMPERATURE: 99 F

## 2024-04-30 DIAGNOSIS — F41.1 GENERALIZED ANXIETY DISORDER: ICD-10-CM

## 2024-04-30 DIAGNOSIS — Z00.00 PREVENTATIVE HEALTH CARE: ICD-10-CM

## 2024-04-30 DIAGNOSIS — E78.2 MIXED HYPERLIPIDEMIA: Chronic | ICD-10-CM

## 2024-04-30 DIAGNOSIS — F31.30 BIPOLAR AFFECTIVE DISORDER, CURRENT EPISODE DEPRESSED, CURRENT EPISODE SEVERITY UNSPECIFIED: ICD-10-CM

## 2024-04-30 DIAGNOSIS — Z00.00 PREVENTATIVE HEALTH CARE: Primary | ICD-10-CM

## 2024-04-30 DIAGNOSIS — Z23 NEED FOR VACCINATION: ICD-10-CM

## 2024-04-30 LAB
BASOPHILS # BLD AUTO: 0.08 K/UL (ref 0–0.2)
BASOPHILS NFR BLD: 1 % (ref 0–1.9)
DIFFERENTIAL METHOD BLD: NORMAL
EOSINOPHIL # BLD AUTO: 0.3 K/UL (ref 0–0.5)
EOSINOPHIL NFR BLD: 4.4 % (ref 0–8)
ERYTHROCYTE [DISTWIDTH] IN BLOOD BY AUTOMATED COUNT: 13.2 % (ref 11.5–14.5)
HCT VFR BLD AUTO: 43.2 % (ref 37–48.5)
HGB BLD-MCNC: 13.9 G/DL (ref 12–16)
IMM GRANULOCYTES # BLD AUTO: 0.02 K/UL (ref 0–0.04)
IMM GRANULOCYTES NFR BLD AUTO: 0.3 % (ref 0–0.5)
LYMPHOCYTES # BLD AUTO: 2.3 K/UL (ref 1–4.8)
LYMPHOCYTES NFR BLD: 29.5 % (ref 18–48)
MCH RBC QN AUTO: 29.6 PG (ref 27–31)
MCHC RBC AUTO-ENTMCNC: 32.2 G/DL (ref 32–36)
MCV RBC AUTO: 92 FL (ref 82–98)
MONOCYTES # BLD AUTO: 0.9 K/UL (ref 0.3–1)
MONOCYTES NFR BLD: 11.1 % (ref 4–15)
NEUTROPHILS # BLD AUTO: 4.2 K/UL (ref 1.8–7.7)
NEUTROPHILS NFR BLD: 53.7 % (ref 38–73)
NRBC BLD-RTO: 0 /100 WBC
PLATELET # BLD AUTO: 364 K/UL (ref 150–450)
PMV BLD AUTO: 10.7 FL (ref 9.2–12.9)
RBC # BLD AUTO: 4.7 M/UL (ref 4–5.4)
WBC # BLD AUTO: 7.77 K/UL (ref 3.9–12.7)

## 2024-04-30 PROCEDURE — 36415 COLL VENOUS BLD VENIPUNCTURE: CPT | Mod: PO | Performed by: FAMILY MEDICINE

## 2024-04-30 PROCEDURE — 3078F DIAST BP <80 MM HG: CPT | Mod: CPTII,S$GLB,, | Performed by: FAMILY MEDICINE

## 2024-04-30 PROCEDURE — 80053 COMPREHEN METABOLIC PANEL: CPT | Performed by: FAMILY MEDICINE

## 2024-04-30 PROCEDURE — 82607 VITAMIN B-12: CPT | Performed by: FAMILY MEDICINE

## 2024-04-30 PROCEDURE — 99396 PREV VISIT EST AGE 40-64: CPT | Mod: 25,S$GLB,, | Performed by: FAMILY MEDICINE

## 2024-04-30 PROCEDURE — 80061 LIPID PANEL: CPT | Performed by: FAMILY MEDICINE

## 2024-04-30 PROCEDURE — 84443 ASSAY THYROID STIM HORMONE: CPT | Performed by: FAMILY MEDICINE

## 2024-04-30 PROCEDURE — 3008F BODY MASS INDEX DOCD: CPT | Mod: CPTII,S$GLB,, | Performed by: FAMILY MEDICINE

## 2024-04-30 PROCEDURE — 3074F SYST BP LT 130 MM HG: CPT | Mod: CPTII,S$GLB,, | Performed by: FAMILY MEDICINE

## 2024-04-30 PROCEDURE — 1159F MED LIST DOCD IN RCRD: CPT | Mod: CPTII,S$GLB,, | Performed by: FAMILY MEDICINE

## 2024-04-30 PROCEDURE — 99999 PR PBB SHADOW E&M-EST. PATIENT-LVL III: CPT | Mod: PBBFAC,,, | Performed by: FAMILY MEDICINE

## 2024-04-30 PROCEDURE — 90471 IMMUNIZATION ADMIN: CPT | Mod: S$GLB,,, | Performed by: FAMILY MEDICINE

## 2024-04-30 PROCEDURE — 85025 COMPLETE CBC W/AUTO DIFF WBC: CPT | Performed by: FAMILY MEDICINE

## 2024-04-30 PROCEDURE — 90632 HEPA VACCINE ADULT IM: CPT | Mod: S$GLB,,, | Performed by: FAMILY MEDICINE

## 2024-04-30 PROCEDURE — 84439 ASSAY OF FREE THYROXINE: CPT | Performed by: FAMILY MEDICINE

## 2024-04-30 NOTE — PROGRESS NOTES
CHIEF COMPLAINT:  This is a 64-year-old female here for preventive health exam.     SUBJECTIVE: The patient is doing well without complaints except for stress related to caring for her mother. GERD has improved and she no longer takes pantoprazole. Patient takes Luvox, Lamictal, and Buspar for bipolar disorder. She was prescribed temazepam 15 mg for sleeplessness. She reports that Seroquel helped with insomnia more than temazepam. She often takes several temazepam sequentially so that she will sleep for 3 days. She has a history of mixed hyperlipidemia. She is obese with a BMI is 30.79.     Eye exam December 2023. Mammogram April 2024.  Pap smear October 2023. Colonoscopy April 2022, due again in April 2032.  Tdap October 2015.  Flu vaccine October 2023. COVID 19 vaccine February, March, November 2021, April 2022, June 2023.     ROS:  GENERAL: Patient denies fever, chills, night sweats. Patient denies weight loss.  Patient denies anorexia, fatigue, weakness or swollen glands.  SKIN: Patient denies rash or hair loss.  HEENT: Patient denies sore throat, ear pain, hearing loss, nasal congestion, or runny nose. Patient denies visual disturbance, eye irritation or discharge.  LUNGS: Patient denies wheeze or hemoptysis.  CARDIOVASCULAR: Patient denies chest pain, shortness of breath, palpitations, syncope or lower extremity edema.  GI: Patient denies abdominal pain, nausea, vomiting, diarrhea, constipation, blood in stool or melena.  GENITOURINARY: Patient denies pelvic pain, vaginal discharge, itch or odor. Patient denies irregular vaginal bleeding. Patient denies dysuria, frequency, hematuria, nocturia, urgency or incontinence.  BREASTS: Patient denies breast pain, mass or nipple discharge.  MUSCULOSKELETAL: Patient denies joint pain, swelling, redness or warmth.  NEUROLOGIC: Patient denies headache, vertigo, paresthesias, weakness in limb, dysarthria, dysphagia or abnormality of gait.  PSYCHIATRIC: Patient denies  anxiety, depression, or memory loss.     OBJECTIVE:   GENERAL: Well-developed well-nourished obese white female alert and oriented x3, in no acute distress. Memory, judgment and cognition without deficit.   SKIN: Clear without rash. Normal color and tone.   HEENT: Eyes: Clear conjunctivae. No scleral icterus. Pupils equal reactive to light and accommodation. Ears: Clear canals. Clear TMs. Nose:  Crusty scabbed lesion left nares.  Without congestion. Pharynx: Without injection or exudates.  NECK: Supple, normal range of motion. No masses, lymphadenopathy or enlarged thyroid. No JVD. Carotids 2+ and equal. No bruits.  LUNGS: Clear to auscultation. Normal respiratory effort.  CARDIOVASCULAR: Regular rhythm, normal S1, S2 without murmur, gallop or rub.  BACK: No CVA or spinal tenderness.  BREASTS: No masses or nipple discharge. Tenderness to palpation right greater than left breast.  ABDOMEN: Normal appearance. Active bowel sounds. Soft, nontender without mass or organomegaly. No rebound or guarding.  EXTREMITIES: Without cyanosis, clubbing or edema. Distal pulses 2+ and equal. Normal range of motion in all extremities. No joint effusion, erythema or warmth.  NEUROLOGIC: Cranial nerves II through XII without deficit. Motor strength equal bilaterally. Sensation normal to touch. Deep tendon reflexes 2+ and equal. Gait without abnormality. No tremor. Negative cerebellar signs.  PELVIC: Deferred to OBGYN.    ASSESSMENT:  1. Preventative health care    2. Mixed hyperlipidemia    3. Generalized anxiety disorder    4. Bipolar affective disorder, current episode depressed, current episode severity unspecified    5. Need for vaccination      PLAN:  1. Weight reduction. Exercise regularly.  2. Age-appropriate counseling.  3. Fasting lab.  4. Mammogram.  5. Hepatitis A vaccine.  6.  Follow-up annually.    This note is generated with speech recognition software and is subject to transcription error and sound alike phrases that may  be missed by proofreading.

## 2024-05-01 LAB
ALBUMIN SERPL BCP-MCNC: 3.9 G/DL (ref 3.5–5.2)
ALP SERPL-CCNC: 125 U/L (ref 55–135)
ALT SERPL W/O P-5'-P-CCNC: 17 U/L (ref 10–44)
ANION GAP SERPL CALC-SCNC: 8 MMOL/L (ref 8–16)
AST SERPL-CCNC: 21 U/L (ref 10–40)
BILIRUB SERPL-MCNC: 0.5 MG/DL (ref 0.1–1)
BUN SERPL-MCNC: 12 MG/DL (ref 8–23)
CALCIUM SERPL-MCNC: 9.5 MG/DL (ref 8.7–10.5)
CHLORIDE SERPL-SCNC: 105 MMOL/L (ref 95–110)
CHOLEST SERPL-MCNC: 223 MG/DL (ref 120–199)
CHOLEST/HDLC SERPL: 4.6 {RATIO} (ref 2–5)
CO2 SERPL-SCNC: 27 MMOL/L (ref 23–29)
CREAT SERPL-MCNC: 0.9 MG/DL (ref 0.5–1.4)
EST. GFR  (NO RACE VARIABLE): >60 ML/MIN/1.73 M^2
GLUCOSE SERPL-MCNC: 96 MG/DL (ref 70–110)
HDLC SERPL-MCNC: 49 MG/DL (ref 40–75)
HDLC SERPL: 22 % (ref 20–50)
LDLC SERPL CALC-MCNC: 150 MG/DL (ref 63–159)
NONHDLC SERPL-MCNC: 174 MG/DL
POTASSIUM SERPL-SCNC: 4.3 MMOL/L (ref 3.5–5.1)
PROT SERPL-MCNC: 7.3 G/DL (ref 6–8.4)
SODIUM SERPL-SCNC: 140 MMOL/L (ref 136–145)
T4 FREE SERPL-MCNC: 0.86 NG/DL (ref 0.71–1.51)
TRIGL SERPL-MCNC: 120 MG/DL (ref 30–150)
TSH SERPL DL<=0.005 MIU/L-ACNC: 5.32 UIU/ML (ref 0.4–4)
VIT B12 SERPL-MCNC: 567 PG/ML (ref 210–950)

## 2024-05-02 ENCOUNTER — TELEPHONE (OUTPATIENT)
Dept: PSYCHIATRY | Facility: CLINIC | Age: 64
End: 2024-05-02
Payer: COMMERCIAL

## 2024-05-02 ENCOUNTER — PATIENT MESSAGE (OUTPATIENT)
Dept: FAMILY MEDICINE | Facility: CLINIC | Age: 64
End: 2024-05-02
Payer: COMMERCIAL

## 2024-05-02 DIAGNOSIS — E03.9 ACQUIRED HYPOTHYROIDISM: Primary | ICD-10-CM

## 2024-05-03 RX ORDER — LEVOTHYROXINE SODIUM 25 UG/1
25 TABLET ORAL
Qty: 90 TABLET | Refills: 3 | Status: SHIPPED | OUTPATIENT
Start: 2024-05-03 | End: 2025-05-03

## 2024-05-06 ENCOUNTER — OFFICE VISIT (OUTPATIENT)
Dept: PSYCHIATRY | Facility: CLINIC | Age: 64
End: 2024-05-06
Payer: COMMERCIAL

## 2024-05-06 DIAGNOSIS — Z63.9 FAMILY CIRCUMSTANCE: ICD-10-CM

## 2024-05-06 DIAGNOSIS — F31.62 BIPOLAR MIXED AFFECTIVE DISORDER, MODERATE: Primary | ICD-10-CM

## 2024-05-06 DIAGNOSIS — G47.00 INSOMNIA, UNSPECIFIED TYPE: ICD-10-CM

## 2024-05-06 DIAGNOSIS — F41.9 ANXIETY: ICD-10-CM

## 2024-05-06 PROCEDURE — 99999 PR PBB SHADOW E&M-EST. PATIENT-LVL I: CPT | Mod: PBBFAC,,, | Performed by: SOCIAL WORKER

## 2024-05-06 PROCEDURE — 90834 PSYTX W PT 45 MINUTES: CPT | Mod: S$GLB,,, | Performed by: SOCIAL WORKER

## 2024-05-06 SDOH — SOCIAL DETERMINANTS OF HEALTH (SDOH): PROBLEM RELATED TO PRIMARY SUPPORT GROUP, UNSPECIFIED: Z63.9

## 2024-05-12 NOTE — PROGRESS NOTES
`    Individual Psychotherapy (PhD/LCSW)    5/3/21    Site:  [facility]         Therapeutic Intervention: Met with patient in clinic.  Met with patient. Outpatient - Insight oriented psychotherapy 45 min - CPT code 08082 and Outpatient - Supportive psychotherapy 45 min - CPT Code 48920    Chief complaint/reason for encounter: depression, mood swings, anxiety, grief and loss, and interpersonal     Interval history and content of current session:  Late entry for 5/3/24. 64 year old female patient presented to clinic for follow up of psychotherapy to address bipolar patterns and anxiety issues. Previous session was 4/1/24. 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 Reported her mother's mobility is declining, due to several health crises, including eye health, sudden dramatic personality change, and worrisome recent medical changes. Patient expressed gratitude for her boss, who has been flexible with her beyond company policy to allow work from home that she has needed, due to her mother's growing care needs. Patient also talked about her youngest granddaughter, set to graduate high school; patient feeling proud of her. Patient denied si/hi, psychosis, cognitive deficits, or substance abuse in the interim. Supportive therapy provided.  Scheduled for follow up 6/3/24.       Treatment plan:  Target symptoms: depression, anxiety , substance abuse, mood swings, grief, relationship tension  Why chosen therapy is appropriate versus another modality: relevant to diagnosis, patient responds to this modality  Outcome monitoring methods: self-report, observation  Therapeutic intervention type: insight oriented psychotherapy, supportive psychotherapy    Risk parameters:  Patient reports no suicidal ideation  Patient reports no homicidal ideation  Patient reports no self-injurious behavior  Patient reports no violent behavior    Verbal  deficits: None    Patient's response to intervention:  The patient's response to intervention is accepting.    Progress toward goals and other mental status changes:  The patient's progress toward goals is mixed    Diagnosis:     ICD-10-CM ICD-9-CM   1. Bipolar mixed affective disorder, moderate  F31.62 296.62   2. Anxiety  F41.9 300.00   3. Insomnia, unspecified type  G47.00 780.52   4. Family circumstance  Z63.9 V61.9     History of Alcohol use disorder     Plan:  individual psychotherapy and medication management by physician    Return to clinic: as scheduled    Length of Service (minutes): 48

## 2024-05-20 ENCOUNTER — HOSPITAL ENCOUNTER (OUTPATIENT)
Dept: RADIOLOGY | Facility: HOSPITAL | Age: 64
Discharge: HOME | End: 2024-05-20
Attending: NURSE PRACTITIONER
Payer: COMMERCIAL

## 2024-05-20 ENCOUNTER — HOSPITAL ENCOUNTER (OUTPATIENT)
Dept: RADIOLOGY | Facility: HOSPITAL | Age: 64
Discharge: HOME | End: 2024-05-20
Attending: FAMILY MEDICINE
Payer: COMMERCIAL

## 2024-05-20 DIAGNOSIS — N83.209 CYST OF OVARY, UNSPECIFIED LATERALITY: ICD-10-CM

## 2024-05-20 DIAGNOSIS — Z12.31 OTHER SCREENING MAMMOGRAM: ICD-10-CM

## 2024-05-20 PROCEDURE — 76830 TRANSVAGINAL US NON-OB: CPT | Mod: 26,,, | Performed by: STUDENT IN AN ORGANIZED HEALTH CARE EDUCATION/TRAINING PROGRAM

## 2024-05-20 PROCEDURE — 77067 SCR MAMMO BI INCL CAD: CPT | Mod: 26,,, | Performed by: RADIOLOGY

## 2024-05-20 PROCEDURE — 77063 BREAST TOMOSYNTHESIS BI: CPT | Mod: 26,,, | Performed by: RADIOLOGY

## 2024-05-20 PROCEDURE — 77067 SCR MAMMO BI INCL CAD: CPT | Mod: TC

## 2024-05-20 PROCEDURE — 76856 US EXAM PELVIC COMPLETE: CPT | Mod: 26,,, | Performed by: STUDENT IN AN ORGANIZED HEALTH CARE EDUCATION/TRAINING PROGRAM

## 2024-05-20 PROCEDURE — 76830 TRANSVAGINAL US NON-OB: CPT | Mod: TC

## 2024-05-20 PROCEDURE — 76856 US EXAM PELVIC COMPLETE: CPT | Mod: TC

## 2024-05-27 ENCOUNTER — OFFICE VISIT (OUTPATIENT)
Dept: OBSTETRICS AND GYNECOLOGY | Facility: CLINIC | Age: 64
End: 2024-05-27
Payer: COMMERCIAL

## 2024-05-27 VITALS
WEIGHT: 207.25 LBS | SYSTOLIC BLOOD PRESSURE: 124 MMHG | DIASTOLIC BLOOD PRESSURE: 72 MMHG | HEIGHT: 67 IN | BODY MASS INDEX: 32.53 KG/M2

## 2024-05-27 DIAGNOSIS — N94.89 ENDOMETRIAL MASS: Primary | ICD-10-CM

## 2024-05-27 DIAGNOSIS — N83.202 CYST OF LEFT OVARY: ICD-10-CM

## 2024-05-27 PROCEDURE — 99213 OFFICE O/P EST LOW 20 MIN: CPT | Mod: S$GLB,,, | Performed by: OBSTETRICS & GYNECOLOGY

## 2024-05-27 PROCEDURE — 1159F MED LIST DOCD IN RCRD: CPT | Mod: CPTII,S$GLB,, | Performed by: OBSTETRICS & GYNECOLOGY

## 2024-05-27 PROCEDURE — 99999 PR PBB SHADOW E&M-EST. PATIENT-LVL III: CPT | Mod: PBBFAC,,, | Performed by: OBSTETRICS & GYNECOLOGY

## 2024-05-27 PROCEDURE — 3074F SYST BP LT 130 MM HG: CPT | Mod: CPTII,S$GLB,, | Performed by: OBSTETRICS & GYNECOLOGY

## 2024-05-27 PROCEDURE — 3078F DIAST BP <80 MM HG: CPT | Mod: CPTII,S$GLB,, | Performed by: OBSTETRICS & GYNECOLOGY

## 2024-05-27 PROCEDURE — 3008F BODY MASS INDEX DOCD: CPT | Mod: CPTII,S$GLB,, | Performed by: OBSTETRICS & GYNECOLOGY

## 2024-05-27 NOTE — Clinical Note
Please schedule D&C hysteroscopy.  I signed surgery consent with her today.  Should be okay for [facility].

## 2024-05-27 NOTE — PROGRESS NOTES
"Subjective     Patient ID: June Stevens is a 64 y.o. female.    Chief Complaint:  Follow-up      History of Present Illness  Follow-up        Presents to f/u ultrasound.  To recap from her visit with me 2023:  " Prior to US done 10/2/23, pt had a very short lived episode of light vaginal bleeding.  Bleeding has since resolved.  No pelvic pain.  Pt is , not on HRT.  PCP, Dr. Kelley, saw pt when she was having the bleeding, and noted it came from the cervical os.  Saw Nnoi Hobson, NP with our department who was going to do EMB, but patient was so nervous about the biopsy that Noni advised her to talk to me.    Pelvic ultrasound:  The uterus measures 6.0 cm in length.   The endometrial stripe in this postmenopausal patient measures 3 mm which is within normal limits.  The right ovary measures 2.7 x 1.4 x 1.4 cm.  The left ovary measures 5.1 x 4.4 x 4.6 cm. A simple left ovarian cyst measuring up to 4.3 cm is noted.  Vascular flow demonstrated to both ovaries.  Complex fluid is noted within the endometrial canal possibly representing blood by products.      Last pap: 10/11/23: normal, HPV neg"    At that appointment, pt declined EMB, and opted instead for repeat ultrasound in 2 months.  Repeat ultrasound was not done until recently on 24 and shows EMS 2mm, still with complex fluid/possible blood products within the endometrial canal, and now with 2.5cm hyperechoic focus within the endometrium.  4.9 cm simple left ovarian cyst, essentially stable in appearance    Pt denies any further bleeding episodes.  Notices a slight sensation in LLQ, but denies any significant pain there.    GYN & OB History  Patient's last menstrual period was 2010 (approximate).   Date of Last Pap: 10/16/2023    OB History    Para Term  AB Living   0 0 0 0 0 0   SAB IAB Ectopic Multiple Live Births   0 0 0 0 0       Review of Systems  Review of Systems       Objective   Physical Exam:   Constitutional: She is " oriented to person, place, and time. She appears well-developed and well-nourished. No distress.                           Neurological: She is alert and oriented to person, place, and time.     Psychiatric:   Anxious about results and about today's visit            Assessment and Plan     1. Endometrial mass    2. Cyst of left ovary             Plan:  June was seen today for follow-up.    Diagnoses and all orders for this visit:    Endometrial mass    Cyst of left ovary     Reviewed US findings.  Explained that the left ovarian cyst is simple, essentially stable in size, and pt not having any significant symptoms from it at this time.  Okay to follow with repeat ultrasound in 3-6 months.  As for the endometrial findings, I recommend sampling.  Can do that with in-office EMB or bring to OR for D&C hysteroscopy under sedation.  Pt prefers procedure under sedation.  Surgical consent for D&C hysteroscopy reviewed in detail and signed.  All questions answered.  Will schedule procedure.

## 2024-05-28 ENCOUNTER — TELEPHONE (OUTPATIENT)
Dept: OBSTETRICS AND GYNECOLOGY | Facility: CLINIC | Age: 64
End: 2024-05-28
Payer: COMMERCIAL

## 2024-05-28 DIAGNOSIS — N94.89 ENDOMETRIAL MASS: Primary | ICD-10-CM

## 2024-05-28 NOTE — TELEPHONE ENCOUNTER
----- Message from Cora Simpson MD sent at 5/27/2024  3:04 PM CDT -----  Please schedule D&C hysteroscopy.  I signed surgery consent with her today.  Should be okay for [facility].

## 2024-05-29 ENCOUNTER — PATIENT MESSAGE (OUTPATIENT)
Dept: OBSTETRICS AND GYNECOLOGY | Facility: CLINIC | Age: 64
End: 2024-05-29
Payer: COMMERCIAL

## 2024-05-29 ENCOUNTER — PATIENT MESSAGE (OUTPATIENT)
Dept: FAMILY MEDICINE | Facility: CLINIC | Age: 64
End: 2024-05-29
Payer: COMMERCIAL

## 2024-06-03 ENCOUNTER — OFFICE VISIT (OUTPATIENT)
Dept: PSYCHIATRY | Facility: CLINIC | Age: 64
End: 2024-06-03
Payer: COMMERCIAL

## 2024-06-03 DIAGNOSIS — F41.9 ANXIETY: ICD-10-CM

## 2024-06-03 DIAGNOSIS — F31.30 BIPOLAR DISORDER, MOST RECENT EPISODE DEPRESSED: Primary | ICD-10-CM

## 2024-06-03 DIAGNOSIS — F31.62 BIPOLAR MIXED AFFECTIVE DISORDER, MODERATE: Primary | ICD-10-CM

## 2024-06-03 DIAGNOSIS — Z63.9 FAMILY DYNAMICS PROBLEM: ICD-10-CM

## 2024-06-03 DIAGNOSIS — G47.00 INSOMNIA, UNSPECIFIED TYPE: ICD-10-CM

## 2024-06-03 PROCEDURE — 99214 OFFICE O/P EST MOD 30 MIN: CPT | Mod: 95,,, | Performed by: PSYCHIATRY & NEUROLOGY

## 2024-06-03 PROCEDURE — 90834 PSYTX W PT 45 MINUTES: CPT | Mod: S$GLB,,, | Performed by: SOCIAL WORKER

## 2024-06-03 PROCEDURE — 99999 PR PBB SHADOW E&M-EST. PATIENT-LVL I: CPT | Mod: PBBFAC,,, | Performed by: SOCIAL WORKER

## 2024-06-03 RX ORDER — MIRTAZAPINE 15 MG/1
TABLET, FILM COATED ORAL
Qty: 135 TABLET | Refills: 1 | Status: SHIPPED | OUTPATIENT
Start: 2024-06-03

## 2024-06-03 RX ORDER — BUSPIRONE HYDROCHLORIDE 30 MG/1
TABLET ORAL
Qty: 180 TABLET | Refills: 1 | Status: SHIPPED | OUTPATIENT
Start: 2024-06-03

## 2024-06-03 RX ORDER — FLUVOXAMINE MALEATE 50 MG/1
50 TABLET, FILM COATED ORAL NIGHTLY
Qty: 90 TABLET | Refills: 1 | Status: SHIPPED | OUTPATIENT
Start: 2024-06-03

## 2024-06-03 RX ORDER — LAMOTRIGINE 150 MG/1
150 TABLET ORAL DAILY
Qty: 90 TABLET | Refills: 1 | Status: SHIPPED | OUTPATIENT
Start: 2024-06-03 | End: 2025-06-03

## 2024-06-03 SDOH — SOCIAL DETERMINANTS OF HEALTH (SDOH): PROBLEM RELATED TO PRIMARY SUPPORT GROUP, UNSPECIFIED: Z63.9

## 2024-06-03 NOTE — PROGRESS NOTES
"`    Individual Psychotherapy (PhD/LCSW)    5/3/21    Site:  [facility]         Therapeutic Intervention: Met with patient in clinic.  Met with patient. Outpatient - Insight oriented psychotherapy 45 min - CPT code 87693 and Outpatient - Supportive psychotherapy 45 min - CPT Code 37615    Chief complaint/reason for encounter: depression, mood swings, anxiety, grief and loss, and interpersonal     Interval history and content of current session:  64 year old female patient returning to clinic for psychotherapy to address bipolar patterns and anxiety issues. Previous session was 5/3/24. Casually dressed in T-shirt and cut-off shorts, she reported on the 8am session time being a challenge for her, but she had scheduled it so as to try to avoid being out in the worst heat of the mid-day. Patient works 4 days a week, 10 hour days, off on weekends and Mondays. She reported on life since her mother's inpatient care, noting her declining health, patient now hesitant to be away from her mother for very long, though she mother does not apparently require constant supervision. 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 Her own partner is at "the other" house and does not get along at all well with the patient's mother, so they stay apart most of the time. The patient still dreams of all of them being on one property, but neither the partner nor mother will agree to that. Patient endorsed feeling preoccupied by the worry of her mother, age 84, potentially having not long to live. Discussed the question of how she thinks she will handle losing her mother when the time does come, and she stated "not well." Stated she did not handle it well when she lost her grandmother many years before, and she anticipates similar but more difficult reaction to losing her mother. We discussed question of what she tells herself about death and loss of loved ones. Patient endorsed tendency " to try to push thoughts of loss out of her mind, rather than to process them. Discussed suggested alternatives to that approach.   Patient denied si/hi, psychosis, cognitive deficits, or substance abuse in the interim. Supportive therapy provided.  Scheduled for follow up 7/1/24.       Treatment plan:  Target symptoms: depression, anxiety , substance abuse, mood swings, grief, relationship tension  Why chosen therapy is appropriate versus another modality: relevant to diagnosis, patient responds to this modality  Outcome monitoring methods: self-report, observation  Therapeutic intervention type: insight oriented psychotherapy, supportive psychotherapy    Risk parameters:  Patient reports no suicidal ideation  Patient reports no homicidal ideation  Patient reports no self-injurious behavior  Patient reports no violent behavior    Verbal deficits: None    Patient's response to intervention:  The patient's response to intervention is accepting.    Progress toward goals and other mental status changes:  The patient's progress toward goals is mixed    Diagnosis:     ICD-10-CM ICD-9-CM   1. Bipolar mixed affective disorder, moderate  F31.62 296.62   2. Anxiety  F41.9 300.00   3. Insomnia, unspecified type  G47.00 780.52   4. Family dynamics problem  Z63.9 V61.9     History of Alcohol use disorder     Plan:  individual psychotherapy and medication management by physician    Return to clinic: as scheduled    Length of Service (minutes): 52

## 2024-06-03 NOTE — PROGRESS NOTES
"Outpatient Psychiatry Follow-up Visit (MD/NP)    6/3/2024    June Stevens, a 64 y.o. female, presenting for follow-up visit. Met with patient.    Reason for Encounter: Chief complaint/reason for encounter: addictive disorder, depression, mood swings, anxiety, behavior and interpersonal.     Interval Hx: Patient seen and interviewed for follow-up, about six months since last visit. This was a VIDEO VISIT. She was at home.   Living between two houses, now more often at her mother's. Stressed by burdens of care, but managing ok. Challenging process of getting her medications adjusted. Using cameras to monitor her activities and safety at home when patient isn't present. Mental health symptoms are ongoing, continue improved from previous unwell baseline. No new health problems. No new medication. Adherent with medication. Describes ongoing benefits. Denies side effects.     Background: Pt is a 58 y/o F who presents for establishment of psychiatric care, previously evaluated by Mr. Junito Costa. From his note: reporting chief complaint of "my partner thought since I am getting medication, I ought probably to have some talk therapy as well." Pt described a history of mood swings & recurrent depression going back to her teenage years, a recurrent struggle with anger & angry outbursts, & poor coping skills, relying too much on binge drinking (less than weekly, but always to a point of heavy intoxication) to blot out anger & anxiety. She described a fragile self-esteem, pattern of defensiveness, heightened irritability, & frequent depressive cycles. She believes she has abandonment issues from her parents having left for [place] abruptly when she was just 4 years old, leaving her & her younger sister with a grandmother. Mom returned alone after 1 year but never fully embodied a parental figure to the pt. Her grandmother, who represented stability,  when the pt was 27. She recalls mood issues going back to Jr " "High age, age 12 or 13. She said that was when social issues at school started to become more difficult as she & those around her started noticing she wasn't conforming to stereotypical feminine norms & came to be suspected of being a lesbian. She described an emotional struggle between her Faith beliefs & growing awareness of her same-sex orientation. She said she tried to date boys. She reported a lot of peer rejection, being known as "a alcides lesbian," & she attempted suicide at age 17, stepping out in front of an oncoming car. By age 19 she had been introduced to the bhandari bar scene, which she experienced as a crucial affirmation that she isn't alone, but which met with her mother's disapproval; it took her mother years to become more accepting. She described her current living situation as a split between 2 households, 1 with her partner of 25 years, who is older & "very controlling," & the other with the pt's mother. She reports tension in her primary relationship that involves infidelity on her part back in '09, an affair stemming from a high school reunion. She said her partner now polices her, & she feels resentful but also doesn't want to leave her partner over it. The pt denied any suicidal or homicidal ideation, though endorsing remote history of a suicidal episode as a teenager. Denied any cognitive deficit, psychosis, mood swings, or recreational drug use. Endorsed past struggle with some alcohol abuse, a period of abstinence with AA participation, & now periodic binges. Endorses struggle with explosive anger. Identified therapeutic goals include reduction of depression & anxiety, improved self-esteem, & coping skills, including anger management, assertiveness communication skills.        Symptoms:   Mood: depressed mood, diminished interest and worthlessness/guilt  Anxiety: excessive anxiety/worry, restlessness/keyed up and irritability  Substance abuse: substance tolerance and take more than " "intended  Cognitive functioning: denied  Health behaviors: some alcohol binges     Psychiatric history: psychotropic management by PCP & prior suicide attempt(s); no chemical abuse treatment reported but did attend AA for years & had a sponsor; now relapsed with a binge pattern of use. Family history of psychiatric illness: suspects father bipolar (never diagnosed); sister with clinical anxiety. Social history: See history above; oldest of 3; younger brother drowned as an infant. Younger sister is her closest friend. Chaotic childhood; parents were very young; left pt & younger sister with grandmother when she was 4; father never returned; mother came back after 1 year. Grandmother, an aunt, & uncle were parental figures. Pt described growing social tension in school in middle school & high school, related to her budding identity as a masculine lesbian. Reported running away from school & home several times in teens. Attempted suicide at 17. Discovered bhandari bars at 19 found the start of a sort of community. 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 Current committed relationship of the past 25 years; described partner as controling, domineering, but "she takes care of me." Endorsed tension of pt's past infidelity a decade ago. Previous longest relationship was for 5 years. No biological children of her own. Never in a relationship with a man. Loves animals; finds them easier to relate to than people. Currently lives between 2 houses, one with her partner, the other with her mother.       Substance use:   Alcohol: binge pattern of abuse; denied any known health damage from alcohol   Drugs: none   Tobacco: none   Caffeine: average of 2 and a half cups of coffee daily    Pt confirms the above history. Reports that she's been anxious her "entire life"; overriding distress & impairment. Past dx'es include depression, bipolar disorder (some impulsive shopping). Meds through Dr. Gamboa " "Dipesh for many years, but stopped seeing Dr. Zhong's in summer 2019. Found treatment unaffordable. Off medicine for 3-4 months. "had some left but thought it wasn't working, it makes me gain weight, & I'm sluggish". Thinks that these things improved off the medication.     "got an ultimatum" to get back onto medication by her partner who patient says has observed that patient is less irritable & agitated on medication (but she doesn't see this herself).     Endorses "yes" to most items on MDQ    Psych Hx: "anger issues" since a kid. "fought a lot", "usually defending other people", "I couldn't verbally say what I felt, so I thought", though rarely disciplinary problems in school. Protected her sister, friends.     Treated with medication by PCP in  or , dx'ed with depression in - by Dr. Zhong. Diagnosis later changed to bipolar disorder, but she doesn't believe this matches her. She's read about borderline personality & thinks that features of this diagnosis better match her understanding of herself. Was mostly off medication after Dr. Landon left private practice.     Went back to Dr. Landon in  when she became available again, has seen her since & has continued on her current regimen. Was seeing a therapist that works with her, Namita Lopez. They recommended a long-term treatment program in [place] at one point. Reports mood problems have led her to be passed up for promotions, advancement. Denies periods of mood elevation that have led to dysfunction.    Family Hx: father   Social Hx: as above. father left at age 6. Wasn't in her life after that, but sought him out at 41, but was disappointed by the relationship because he wanted money from her.   Has a younger sister & younger brother. Brother , drowned in the bathtub.   Lives between two households (between mother's and partner's). Mother is critical.   Master's degree. Works for the state.   No arrests.     Review Of Systems: "     GENERAL:  No weight gain or loss  SKIN:  No rashes or lacerations  HEAD:  No headaches  CHEST:  No shortness of breath, hyperventilation or cough  CARDIOVASCULAR:  No tachycardia or chest pain  ABDOMEN:  No nausea, vomiting, pain, constipation or diarrhea  URINARY:  No frequency, dysuria or sexual dysfunction  ENDOCRINE:  No polydipsia, polyuria  MUSCULOSKELETAL:  No pain or stiffness of the joints  NEUROLOGIC:  No weakness, sensory changes, seizures, confusion, memory loss, tremor or other abnormal movements    Current Evaluation:     Nutritional Screening: Considering the patient's height and weight, medications, medical history and preferences, should a referral be made to the dietitian? no    Constitutional  Vitals:  Most recent vital signs, dated less than 90 days prior to this appointment, were reviewed.       General:  unremarkable, age appropriate     Musculoskeletal  Muscle Strength/Tone:  no tremor, no tic   Gait & Station:  non-ataxic     Psychiatric  Appearance: casually dressed & groomed;   Behavior: calm,   Cooperation: cooperative with assessment  Speech: normal rate, volume, tone  Thought Process: linear, goal-directed  Thought Content: No suicidal or homicidal ideation; no delusions  Affect: anxious  Mood: anxious  Perceptions: No auditory or visual hallucinations  Level of Consciousness: alert throughout interview  Insight: fair  Cognition: Oriented to person, place, time, & situation  Memory: no apparent deficits to general clinical interview; not formally assessed  Attention/Concentration: no apparent deficits to general clinical interview; not formally assessed  Fund of Knowledge: average by vocabulary/education    Laboratory Data  No visits with results within 1 Month(s) from this visit.   Latest known visit with results is:   Lab Visit on 04/30/2024   Component Date Value Ref Range Status    Sodium 04/30/2024 140  136 - 145 mmol/L Final    Potassium 04/30/2024 4.3  3.5 - 5.1 mmol/L Final     Chloride 04/30/2024 105  95 - 110 mmol/L Final    CO2 04/30/2024 27  23 - 29 mmol/L Final    Glucose 04/30/2024 96  70 - 110 mg/dL Final    BUN 04/30/2024 12  8 - 23 mg/dL Final    Creatinine 04/30/2024 0.9  0.5 - 1.4 mg/dL Final    Calcium 04/30/2024 9.5  8.7 - 10.5 mg/dL Final    Total Protein 04/30/2024 7.3  6.0 - 8.4 g/dL Final    Albumin 04/30/2024 3.9  3.5 - 5.2 g/dL Final    Total Bilirubin 04/30/2024 0.5  0.1 - 1.0 mg/dL Final    Alkaline Phosphatase 04/30/2024 125  55 - 135 U/L Final    AST 04/30/2024 21  10 - 40 U/L Final    ALT 04/30/2024 17  10 - 44 U/L Final    eGFR 04/30/2024 >60.0  >60 mL/min/1.73 m^2 Final    Anion Gap 04/30/2024 8  8 - 16 mmol/L Final    WBC 04/30/2024 7.77  3.90 - 12.70 K/uL Final    RBC 04/30/2024 4.70  4.00 - 5.40 M/uL Final    Hemoglobin 04/30/2024 13.9  12.0 - 16.0 g/dL Final    Hematocrit 04/30/2024 43.2  37.0 - 48.5 % Final    MCV 04/30/2024 92  82 - 98 fL Final    MCH 04/30/2024 29.6  27.0 - 31.0 pg Final    MCHC 04/30/2024 32.2  32.0 - 36.0 g/dL Final    RDW 04/30/2024 13.2  11.5 - 14.5 % Final    Platelets 04/30/2024 364  150 - 450 K/uL Final    MPV 04/30/2024 10.7  9.2 - 12.9 fL Final    Immature Granulocytes 04/30/2024 0.3  0.0 - 0.5 % Final    Gran # (ANC) 04/30/2024 4.2  1.8 - 7.7 K/uL Final    Immature Grans (Abs) 04/30/2024 0.02  0.00 - 0.04 K/uL Final    Lymph # 04/30/2024 2.3  1.0 - 4.8 K/uL Final    Mono # 04/30/2024 0.9  0.3 - 1.0 K/uL Final    Eos # 04/30/2024 0.3  0.0 - 0.5 K/uL Final    Baso # 04/30/2024 0.08  0.00 - 0.20 K/uL Final    nRBC 04/30/2024 0  0 /100 WBC Final    Gran % 04/30/2024 53.7  38.0 - 73.0 % Final    Lymph % 04/30/2024 29.5  18.0 - 48.0 % Final    Mono % 04/30/2024 11.1  4.0 - 15.0 % Final    Eosinophil % 04/30/2024 4.4  0.0 - 8.0 % Final    Basophil % 04/30/2024 1.0  0.0 - 1.9 % Final    Differential Method 04/30/2024 Automated   Final    Cholesterol 04/30/2024 223 (H)  120 - 199 mg/dL Final    Triglycerides 04/30/2024 120  30 - 150  mg/dL Final    HDL 04/30/2024 49  40 - 75 mg/dL Final    LDL Cholesterol 04/30/2024 150.0  63.0 - 159.0 mg/dL Final    HDL/Cholesterol Ratio 04/30/2024 22.0  20.0 - 50.0 % Final    Total Cholesterol/HDL Ratio 04/30/2024 4.6  2.0 - 5.0 Final    Non-HDL Cholesterol 04/30/2024 174  mg/dL Final    TSH 04/30/2024 5.320 (H)  0.400 - 4.000 uIU/mL Final    Vitamin B-12 04/30/2024 567  210 - 950 pg/mL Final    Free T4 04/30/2024 0.86  0.71 - 1.51 ng/dL Final     Medications  Outpatient Encounter Medications as of 6/3/2024   Medication Sig Dispense Refill    busPIRone (BUSPAR) 30 MG Tab TAKE 1 TABLET(30 MG) BY MOUTH TWICE DAILY 180 tablet 1    fluvoxaMINE (LUVOX) 50 MG Tab tablet Take 1 tablet (50 mg total) by mouth every evening. 90 tablet 1    ibuprofen (ADVIL,MOTRIN) 800 MG tablet Take 1 tablet (800 mg total) by mouth 3 (three) times daily as needed. 60 tablet 0    lamoTRIgine (LAMICTAL) 150 MG Tab Take 1 tablet (150 mg total) by mouth once daily. 90 tablet 1    levothyroxine (SYNTHROID) 25 MCG tablet Take 1 tablet (25 mcg total) by mouth before breakfast. 90 tablet 3    mirtazapine (REMERON) 15 MG tablet TAKE 1 TABLET(15 MG) BY MOUTH EVERY NIGHT AS NEEDED FOR SLEEP 90 tablet 1    pantoprazole (PROTONIX) 40 MG tablet TAKE 1 TABLET BY MOUTH EVERY MORNING 30 MINUTES BEFORE EATING MEAL 90 tablet 2    valACYclovir (VALTREX) 1000 MG tablet Take 2 tablets (2,000 mg total) by mouth every 12 (twelve) hours. 4 tablet 0     No facility-administered encounter medications on file as of 6/3/2024.     Assessment - Diagnosis - Goals:     Impression: Patient is a 64 y/o F with chronic bipolar disorder, with active major depressive episode despite adherence with medication; has hx of remote substance use disorders. Some progress on insomnia when has regular medication. Role stress ongoing w/r/t care for mother.     Dx: bipolar I disorder, mre depressed; anxiety    Treatment Goals:  Specify outcomes written in observable, behavioral terms:  prevent recurrences.    Treatment Plan/Recommendations:   Lamotrigine, fluvoxamine, buspirone for anxiety. mirtazapine qhs prn sleep.  Discussed risks, benefits, and alternatives to treatment plan documented above with patient. I answered all patient questions related to this plan and patient expressed understanding and agreement.     Return to Clinic: 4 months    CLINT Soriano MD  Psychiatry, [facility]

## 2024-07-01 ENCOUNTER — PATIENT MESSAGE (OUTPATIENT)
Dept: PSYCHIATRY | Facility: CLINIC | Age: 64
End: 2024-07-01
Payer: COMMERCIAL

## 2024-07-01 ENCOUNTER — OFFICE VISIT (OUTPATIENT)
Dept: PSYCHIATRY | Facility: CLINIC | Age: 64
End: 2024-07-01
Payer: COMMERCIAL

## 2024-07-01 DIAGNOSIS — Z63.9 FAMILY DYNAMICS PROBLEM: ICD-10-CM

## 2024-07-01 DIAGNOSIS — F41.9 ANXIETY: ICD-10-CM

## 2024-07-01 DIAGNOSIS — F31.62 BIPOLAR MIXED AFFECTIVE DISORDER, MODERATE: Primary | ICD-10-CM

## 2024-07-01 DIAGNOSIS — G47.00 INSOMNIA, UNSPECIFIED TYPE: ICD-10-CM

## 2024-07-01 PROCEDURE — 90834 PSYTX W PT 45 MINUTES: CPT | Mod: S$GLB,,, | Performed by: SOCIAL WORKER

## 2024-07-01 PROCEDURE — 99999 PR PBB SHADOW E&M-EST. PATIENT-LVL I: CPT | Mod: PBBFAC,,, | Performed by: SOCIAL WORKER

## 2024-07-01 SDOH — SOCIAL DETERMINANTS OF HEALTH (SDOH): PROBLEM RELATED TO PRIMARY SUPPORT GROUP, UNSPECIFIED: Z63.9

## 2024-07-03 ENCOUNTER — PATIENT MESSAGE (OUTPATIENT)
Dept: PREADMISSION TESTING | Facility: HOSPITAL | Age: 64
End: 2024-07-03
Payer: COMMERCIAL

## 2024-07-03 ENCOUNTER — HOSPITAL ENCOUNTER (OUTPATIENT)
Dept: CARDIOLOGY | Facility: HOSPITAL | Age: 64
Discharge: HOME | End: 2024-07-03
Attending: OBSTETRICS & GYNECOLOGY
Payer: COMMERCIAL

## 2024-07-03 DIAGNOSIS — N94.89 ENDOMETRIAL MASS: ICD-10-CM

## 2024-07-03 PROCEDURE — 93010 ELECTROCARDIOGRAM REPORT: CPT | Mod: ,,, | Performed by: STUDENT IN AN ORGANIZED HEALTH CARE EDUCATION/TRAINING PROGRAM

## 2024-07-03 PROCEDURE — 93005 ELECTROCARDIOGRAM TRACING: CPT

## 2024-07-04 ENCOUNTER — PATIENT MESSAGE (OUTPATIENT)
Dept: OBSTETRICS AND GYNECOLOGY | Facility: CLINIC | Age: 64
End: 2024-07-04
Payer: COMMERCIAL

## 2024-07-04 DIAGNOSIS — R94.31 ABNORMAL EKG: Primary | ICD-10-CM

## 2024-07-04 LAB
OHS QRS DURATION: 74 MS
OHS QTC CALCULATION: 412 MS

## 2024-07-07 NOTE — PROGRESS NOTES
`    Individual Psychotherapy (PhD/LCSW)    5/3/21    Site:  [facility]         Therapeutic Intervention: Met with patient in clinic.  Met with patient. Outpatient - Insight oriented psychotherapy 45 min - CPT code 12763 and Outpatient - Supportive psychotherapy 45 min - CPT Code 09289    Chief complaint/reason for encounter: depression, mood swings, anxiety, grief and loss, and interpersonal     Interval history and content of current session:  64 year old female patient returned to clinic for follow-up psychotherapy to address bipolar disorder concerns and anxiety issues. Last seen Previous session was 6/3/24. Patient was again casually dressed in T-shirt and shorts. Reported the previous Saturday having been her mother's 84th birthday, as well as that Friday the patient's sister's birthday. Patient talked about gathering and coordinated observance, much of which she planned herself and was well-received by relatives. She then talked about the birthday observance as the latest example of a long pattern she said her partner has commented on, which the patient concurs is true, that the patient will often go to significant effort to plan something thoughtful in a social gathering, then at the even approaches, she will worry herself to distraction about various contingencies for things to go wrong. She said rarely are things as bad as she imagines, but it does not stop her from destroying her own enjoyment with the exaggerated worries. She said her partner does often call her out on it. She went on to talk about family patterns of mood instability, in both her mother and father, among others. Patient denied si/hi, psychosis, cognitive deficits, or substance abuse in the interim. Supportive therapy provided.  Scheduled for follow up 8/5/24.       Treatment plan:  Target symptoms: depression, anxiety , substance abuse, mood swings, grief, relationship tension  Why chosen therapy is appropriate versus another modality:  relevant to diagnosis, patient responds to this modality  Outcome monitoring methods: self-report, observation  Therapeutic intervention type: insight oriented psychotherapy, supportive psychotherapy    Risk parameters:  Patient reports no suicidal ideation  Patient reports no homicidal ideation  Patient reports no self-injurious behavior  Patient reports no violent behavior    Verbal deficits: None    Patient's response to intervention:  The patient's response to intervention is accepting.    Progress toward goals and other mental status changes:  The patient's progress toward goals is mixed    Diagnosis:     ICD-10-CM ICD-9-CM   1. Bipolar mixed affective disorder, moderate  F31.62 296.62   2. Anxiety  F41.9 300.00   3. Insomnia, unspecified type  G47.00 780.52   4. Family dynamics problem  Z63.9 V61.9     History of Alcohol use disorder     Plan:  individual psychotherapy and medication management by physician    Return to clinic: as scheduled    Length of Service (minutes): 48

## 2024-07-08 ENCOUNTER — PATIENT MESSAGE (OUTPATIENT)
Dept: FAMILY MEDICINE | Facility: CLINIC | Age: 64
End: 2024-07-08
Payer: COMMERCIAL

## 2024-07-08 ENCOUNTER — OFFICE VISIT (OUTPATIENT)
Dept: CARDIOLOGY | Facility: CLINIC | Age: 64
End: 2024-07-08
Payer: COMMERCIAL

## 2024-07-08 ENCOUNTER — ANESTHESIA EVENT (OUTPATIENT)
Dept: SURGERY | Facility: HOSPITAL | Age: 64
End: 2024-07-08
Payer: COMMERCIAL

## 2024-07-08 VITALS
WEIGHT: 199.19 LBS | OXYGEN SATURATION: 96 % | HEIGHT: 67 IN | BODY MASS INDEX: 31.26 KG/M2 | HEART RATE: 82 BPM | DIASTOLIC BLOOD PRESSURE: 86 MMHG | SYSTOLIC BLOOD PRESSURE: 127 MMHG | RESPIRATION RATE: 16 BRPM

## 2024-07-08 DIAGNOSIS — Z01.810 PREOP CARDIOVASCULAR EXAM: Primary | ICD-10-CM

## 2024-07-08 DIAGNOSIS — E78.2 MIXED HYPERLIPIDEMIA: Chronic | ICD-10-CM

## 2024-07-08 DIAGNOSIS — F31.30 BIPOLAR AFFECTIVE DISORDER, CURRENT EPISODE DEPRESSED, CURRENT EPISODE SEVERITY UNSPECIFIED: ICD-10-CM

## 2024-07-08 DIAGNOSIS — R94.31 ABNORMAL EKG: ICD-10-CM

## 2024-07-08 DIAGNOSIS — N94.89 ENDOMETRIAL MASS: ICD-10-CM

## 2024-07-08 PROCEDURE — 1159F MED LIST DOCD IN RCRD: CPT | Mod: CPTII,S$GLB,, | Performed by: INTERNAL MEDICINE

## 2024-07-08 PROCEDURE — 3074F SYST BP LT 130 MM HG: CPT | Mod: CPTII,S$GLB,, | Performed by: INTERNAL MEDICINE

## 2024-07-08 PROCEDURE — 3008F BODY MASS INDEX DOCD: CPT | Mod: CPTII,S$GLB,, | Performed by: INTERNAL MEDICINE

## 2024-07-08 PROCEDURE — 99999 PR PBB SHADOW E&M-EST. PATIENT-LVL IV: CPT | Mod: PBBFAC,,, | Performed by: INTERNAL MEDICINE

## 2024-07-08 PROCEDURE — 3079F DIAST BP 80-89 MM HG: CPT | Mod: CPTII,S$GLB,, | Performed by: INTERNAL MEDICINE

## 2024-07-08 PROCEDURE — 1160F RVW MEDS BY RX/DR IN RCRD: CPT | Mod: CPTII,S$GLB,, | Performed by: INTERNAL MEDICINE

## 2024-07-08 PROCEDURE — 99204 OFFICE O/P NEW MOD 45 MIN: CPT | Mod: S$GLB,,, | Performed by: INTERNAL MEDICINE

## 2024-07-08 NOTE — PROGRESS NOTES
Subjective:   Patient ID:  June Stevens is a 64 y.o. female who presents for evaluation of No chief complaint on file.      65 yo female, referred for preop clearance of uterus leiomyoma removal by Dr. Simpson  [facility] HLD GERD anxiety thyroid Dz, no h/o MI DM and CVA and ca  No smoking and drinking  Exercise regularly   She denied chest pain, dyspnea on exertion, palpitation, fainting, PND, orthopnea, syncope and claudication.   EKG reviewed by myself today NSR and poor R progression on precordial leads.  The 10-year ASCVD risk score (Blaise VALENZUELA, et al., 2019) is: 5%    Values used to calculate the score:      Age: 64 years      Sex: Female      Is Non- : No      Diabetic: No      Tobacco smoker: No      Systolic Blood Pressure: 127 mmHg      Is BP treated: No      HDL Cholesterol: 49 mg/dL      Total Cholesterol: 223 mg/dL        No results found for this or any previous visit.     No results found for this or any previous visit.       Past Medical History:   Diagnosis Date    Anxiety     Bipolar disorder     Generalized anxiety disorder     GERD (gastroesophageal reflux disease)     Hyperlipidemia     Obesity     Preop cardiovascular exam 7/8/2024    Thyroid disease     Urinary incontinence        Past Surgical History:   Procedure Laterality Date    CHOLECYSTECTOMY      COLONOSCOPY N/A 04/04/2022    Procedure: COLONOSCOPY;  Surgeon: Ling Mustafa MD;  Location: [facility];  Service: Endoscopy;  Laterality: N/A;    INCONTINENCE SURGERY  2008    LAPAROSCOPIC SLEEVE GASTRECTOMY  08/2008    LASIK         Social History     Tobacco Use    Smoking status: Never    Smokeless tobacco: Never   Substance Use Topics    Alcohol use: No     Comment: Past history of alcohol abuse    Drug use: No       Family History   Problem Relation Name Age of Onset    Hypertension Mother      Macular degeneration Mother      Retinal detachment Mother      COPD Mother      Heart failure Mother      Diabetes  Father      No Known Problems Sister      Other Brother          Drowning    Hypertension Maternal Grandmother      Diabetes Maternal Grandmother      Heart disease Maternal Grandmother      Diabetes Mellitus Maternal Aunt         Review of Systems   Constitutional: Negative for decreased appetite, diaphoresis, fever, malaise/fatigue and night sweats.   HENT:  Negative for nosebleeds.    Eyes:  Negative for blurred vision and double vision.   Cardiovascular:  Negative for chest pain, claudication, dyspnea on exertion, irregular heartbeat, leg swelling, near-syncope, orthopnea, palpitations, paroxysmal nocturnal dyspnea and syncope.   Respiratory:  Negative for cough, shortness of breath, sleep disturbances due to breathing, snoring, sputum production and wheezing.    Endocrine: Negative for cold intolerance and polyuria.   Hematologic/Lymphatic: Does not bruise/bleed easily.   Skin:  Negative for rash.   Musculoskeletal:  Negative for back pain, falls, joint pain, joint swelling and neck pain.   Gastrointestinal:  Negative for abdominal pain, heartburn, nausea and vomiting.   Genitourinary:  Negative for dysuria, frequency and hematuria.   Neurological:  Negative for difficulty with concentration, dizziness, focal weakness, headaches, light-headedness, numbness, seizures and weakness.   Psychiatric/Behavioral:  Negative for depression, memory loss and substance abuse. The patient does not have insomnia.    Allergic/Immunologic: Negative for HIV exposure and hives.       Objective:   Physical Exam  HENT:      Head: Normocephalic.   Eyes:      Pupils: Pupils are equal, round, and reactive to light.   Neck:      Thyroid: No thyromegaly.      Vascular: Normal carotid pulses. No carotid bruit or JVD.   Cardiovascular:      Rate and Rhythm: Normal rate and regular rhythm. No extrasystoles are present.     Chest Wall: PMI is not displaced.      Pulses: Normal pulses.      Heart sounds: Normal heart sounds. No murmur  "heard.     No gallop. No S3 sounds.   Pulmonary:      Effort: No respiratory distress.      Breath sounds: Normal breath sounds. No stridor.   Abdominal:      General: Bowel sounds are normal.      Palpations: Abdomen is soft.      Tenderness: There is no abdominal tenderness. There is no rebound.   Musculoskeletal:         General: Normal range of motion.   Skin:     Findings: No rash.   Neurological:      Mental Status: She is alert and oriented to person, place, and time.   Psychiatric:         Behavior: Behavior normal.         Lab Results   Component Value Date    CHOL 223 (H) 04/30/2024    CHOL 211 (H) 08/18/2023    CHOL 209 (H) 02/27/2023     Lab Results   Component Value Date    HDL 49 04/30/2024    HDL 68 08/18/2023    HDL 49 02/27/2023     Lab Results   Component Value Date    LDLCALC 150.0 04/30/2024    LDLCALC 123 08/18/2023    LDLCALC 126.4 02/27/2023     Lab Results   Component Value Date    TRIG 120 04/30/2024    TRIG 102 08/18/2023    TRIG 168 (H) 02/27/2023     Lab Results   Component Value Date    CHOLHDL 22.0 04/30/2024    CHOLHDL 3.1 08/18/2023    CHOLHDL 23.4 02/27/2023       Chemistry        Component Value Date/Time     07/03/2024 0826    K 3.9 07/03/2024 0826     07/03/2024 0826    CO2 25 07/03/2024 0826    BUN 11 07/03/2024 0826    CREATININE 0.8 07/03/2024 0826    GLU 88 07/03/2024 0826        Component Value Date/Time    CALCIUM 8.8 07/03/2024 0826    ALKPHOS 125 04/30/2024 0904    AST 21 04/30/2024 0904    ALT 17 04/30/2024 0904    BILITOT 0.5 04/30/2024 0904    ESTGFRAFRICA >60.0 01/31/2022 1037    EGFRNONAA >60.0 01/31/2022 1037          Lab Results   Component Value Date    HGBA1C 5.6 01/04/2006     Lab Results   Component Value Date    TSH 5.320 (H) 04/30/2024     No results found for: "INR", "PROTIME"  Lab Results   Component Value Date    WBC 4.65 07/03/2024    HGB 12.4 07/03/2024    HCT 40.0 07/03/2024    MCV 92 07/03/2024     07/03/2024 "     BNP  @LABRCNTIP(BNP,BNPTRIAGEBLO)@  Estimated Creatinine Clearance: 83.1 mL/min (based on SCr of 0.8 mg/dL).  No results found in the last 24 hours.  No results found in the last 24 hours.  No results found in the last 24 hours.    Assessment:      1. Preop cardiovascular exam    2. Abnormal EKG    3. Bipolar affective disorder, current episode depressed, current episode severity unspecified    4. Mixed hyperlipidemia    5. Endometrial mass         BP C  Plan:     Elevated periop risk of CV events for non-high risk procedure.  Good functional and exercise capacity.  No chest pain, active arrhythmia and CHF symptoms.  Ok to proceed the scheduled surgery without further cardiac study.        Counseled DASH  Check Lipid profile with PCP in 6 months  Recommend heart-healthy diet, weight control and regular exercise.  Agueda. Risk modification.   I have reviewed all pertinent labs and cardiac studies independently. Plans and recommendations have been formulated under my direct supervision. All questions answered and patient voiced understanding.   If symptoms persist go to the ED  F/u with pcp

## 2024-07-08 NOTE — ANESTHESIA PREPROCEDURE EVALUATION
07/08/2024  June Stevens is a 64 y.o., female.  Past Medical History:   Diagnosis Date    Anxiety     Bipolar disorder     Generalized anxiety disorder     GERD (gastroesophageal reflux disease)     Hyperlipidemia     Obesity     Preop cardiovascular exam 7/8/2024    Thyroid disease     Urinary incontinence      Past Surgical History:   Procedure Laterality Date    CHOLECYSTECTOMY      COLONOSCOPY N/A 04/04/2022    Procedure: COLONOSCOPY;  Surgeon: Ling Mustafa MD;  Location: [facility];  Service: Endoscopy;  Laterality: N/A;    INCONTINENCE SURGERY  2008    LAPAROSCOPIC SLEEVE GASTRECTOMY  08/2008    LASIK         Pre-op Assessment    I have reviewed the Patient Summary Reports.    I have reviewed the NPO Status.   I have reviewed the Medications.     Review of Systems  Anesthesia Hx:   History of prior surgery of interest to airway management or planning:            Denies Personal Hx of Anesthesia complications.                    Hematology/Oncology:  Hematology Normal                                     Cardiovascular:                hyperlipidemia                             Pulmonary:  Pulmonary Normal                       Hepatic/GI:     GERD      Gerd          Endocrine:   Hypothyroidism          Psych:  Psychiatric History anxiety                 Physical Exam  General: Well nourished    Airway:  Mallampati: III   Mouth Opening: Normal  TM Distance: Normal  Tongue: Normal  Neck ROM: Normal ROM        Anesthesia Plan  Type of Anesthesia, risks & benefits discussed:    Anesthesia Type: Gen ETT, Gen Supraglottic Airway  Intra-op Monitoring Plan: Standard ASA Monitors  Post Op Pain Control Plan: multimodal analgesia and IV/PO Opioids PRN  Induction:  IV  Informed Consent: Informed consent signed with the Patient and all parties understand the risks and agree with anesthesia plan.  All  questions answered. Patient consented to blood products? No  ASA Score: 2  Day of Surgery Review of History & Physical: H&P Update referred to the surgeon/provider.    Ready For Surgery From Anesthesia Perspective.     .

## 2024-07-09 ENCOUNTER — PATIENT MESSAGE (OUTPATIENT)
Dept: PREADMISSION TESTING | Facility: HOSPITAL | Age: 64
End: 2024-07-09
Payer: COMMERCIAL

## 2024-07-10 ENCOUNTER — HOSPITAL ENCOUNTER (OUTPATIENT)
Facility: HOSPITAL | Age: 64
Discharge: HOME | End: 2024-07-10
Attending: OBSTETRICS & GYNECOLOGY | Admitting: OBSTETRICS & GYNECOLOGY
Payer: COMMERCIAL

## 2024-07-10 ENCOUNTER — TELEPHONE (OUTPATIENT)
Dept: OBSTETRICS AND GYNECOLOGY | Facility: CLINIC | Age: 64
End: 2024-07-10
Payer: COMMERCIAL

## 2024-07-10 ENCOUNTER — ANESTHESIA (OUTPATIENT)
Dept: SURGERY | Facility: HOSPITAL | Age: 64
End: 2024-07-10
Payer: COMMERCIAL

## 2024-07-10 DIAGNOSIS — Z01.810 PREOP CARDIOVASCULAR EXAM: ICD-10-CM

## 2024-07-10 DIAGNOSIS — N94.89 ENDOMETRIAL MASS: Primary | ICD-10-CM

## 2024-07-10 DIAGNOSIS — R10.9 ACUTE LEFT FLANK PAIN: ICD-10-CM

## 2024-07-10 DIAGNOSIS — N95.0 POSTMENOPAUSAL BLEEDING: ICD-10-CM

## 2024-07-10 LAB — POCT GLUCOSE: 89 MG/DL (ref 70–110)

## 2024-07-10 PROCEDURE — 27200651 HC AIRWAY, LMA: Performed by: ANESTHESIOLOGY

## 2024-07-10 PROCEDURE — 71000033 HC RECOVERY, INTIAL HOUR: Performed by: OBSTETRICS & GYNECOLOGY

## 2024-07-10 PROCEDURE — 36000706: Performed by: OBSTETRICS & GYNECOLOGY

## 2024-07-10 PROCEDURE — 58558 HYSTEROSCOPY BIOPSY: CPT | Mod: ,,, | Performed by: OBSTETRICS & GYNECOLOGY

## 2024-07-10 PROCEDURE — 37000008 HC ANESTHESIA 1ST 15 MINUTES: Performed by: OBSTETRICS & GYNECOLOGY

## 2024-07-10 PROCEDURE — 63600175 PHARM REV CODE 636 W HCPCS: Performed by: NURSE ANESTHETIST, CERTIFIED REGISTERED

## 2024-07-10 PROCEDURE — 88305 TISSUE EXAM BY PATHOLOGIST: CPT | Performed by: PATHOLOGY

## 2024-07-10 PROCEDURE — 36000707: Performed by: OBSTETRICS & GYNECOLOGY

## 2024-07-10 PROCEDURE — C1782 MORCELLATOR: HCPCS | Performed by: OBSTETRICS & GYNECOLOGY

## 2024-07-10 PROCEDURE — 37000009 HC ANESTHESIA EA ADD 15 MINS: Performed by: OBSTETRICS & GYNECOLOGY

## 2024-07-10 PROCEDURE — 88305 TISSUE EXAM BY PATHOLOGIST: CPT | Mod: 26,,, | Performed by: PATHOLOGY

## 2024-07-10 PROCEDURE — 63600175 PHARM REV CODE 636 W HCPCS: Performed by: OBSTETRICS & GYNECOLOGY

## 2024-07-10 PROCEDURE — 27201423 OPTIME MED/SURG SUP & DEVICES STERILE SUPPLY: Performed by: OBSTETRICS & GYNECOLOGY

## 2024-07-10 PROCEDURE — 71000015 HC POSTOP RECOV 1ST HR: Performed by: OBSTETRICS & GYNECOLOGY

## 2024-07-10 PROCEDURE — 25000003 PHARM REV CODE 250: Performed by: NURSE ANESTHETIST, CERTIFIED REGISTERED

## 2024-07-10 PROCEDURE — 25000003 PHARM REV CODE 250: Performed by: OBSTETRICS & GYNECOLOGY

## 2024-07-10 RX ORDER — MIDAZOLAM HYDROCHLORIDE 1 MG/ML
INJECTION INTRAMUSCULAR; INTRAVENOUS
Status: DISCONTINUED | OUTPATIENT
Start: 2024-07-10 | End: 2024-07-10

## 2024-07-10 RX ORDER — GLUCAGON 1 MG
1 KIT INJECTION
Status: DISCONTINUED | OUTPATIENT
Start: 2024-07-10 | End: 2024-07-10 | Stop reason: HOSPADM

## 2024-07-10 RX ORDER — ONDANSETRON HYDROCHLORIDE 2 MG/ML
4 INJECTION, SOLUTION INTRAVENOUS DAILY PRN
Status: DISCONTINUED | OUTPATIENT
Start: 2024-07-10 | End: 2024-07-10 | Stop reason: HOSPADM

## 2024-07-10 RX ORDER — FAMOTIDINE 20 MG/1
20 TABLET, FILM COATED ORAL
Status: COMPLETED | OUTPATIENT
Start: 2024-07-10 | End: 2024-07-10

## 2024-07-10 RX ORDER — ACETAMINOPHEN 500 MG
1000 TABLET ORAL
Status: COMPLETED | OUTPATIENT
Start: 2024-07-10 | End: 2024-07-10

## 2024-07-10 RX ORDER — FENTANYL CITRATE 50 UG/ML
25 INJECTION, SOLUTION INTRAMUSCULAR; INTRAVENOUS EVERY 5 MIN PRN
Status: DISCONTINUED | OUTPATIENT
Start: 2024-07-10 | End: 2024-07-10 | Stop reason: HOSPADM

## 2024-07-10 RX ORDER — ONDANSETRON HYDROCHLORIDE 2 MG/ML
INJECTION, SOLUTION INTRAVENOUS
Status: DISCONTINUED | OUTPATIENT
Start: 2024-07-10 | End: 2024-07-10

## 2024-07-10 RX ORDER — IBUPROFEN 800 MG/1
800 TABLET ORAL 3 TIMES DAILY PRN
Qty: 30 TABLET | Refills: 0 | Status: SHIPPED | OUTPATIENT
Start: 2024-07-10

## 2024-07-10 RX ORDER — DEXAMETHASONE SODIUM PHOSPHATE 4 MG/ML
INJECTION, SOLUTION INTRA-ARTICULAR; INTRALESIONAL; INTRAMUSCULAR; INTRAVENOUS; SOFT TISSUE
Status: DISCONTINUED | OUTPATIENT
Start: 2024-07-10 | End: 2024-07-10

## 2024-07-10 RX ORDER — FENTANYL CITRATE 50 UG/ML
INJECTION, SOLUTION INTRAMUSCULAR; INTRAVENOUS
Status: DISCONTINUED | OUTPATIENT
Start: 2024-07-10 | End: 2024-07-10

## 2024-07-10 RX ORDER — PROPOFOL 10 MG/ML
VIAL (ML) INTRAVENOUS
Status: DISCONTINUED | OUTPATIENT
Start: 2024-07-10 | End: 2024-07-10

## 2024-07-10 RX ORDER — LIDOCAINE HYDROCHLORIDE 20 MG/ML
INJECTION, SOLUTION EPIDURAL; INFILTRATION; INTRACAUDAL; PERINEURAL
Status: DISCONTINUED | OUTPATIENT
Start: 2024-07-10 | End: 2024-07-10

## 2024-07-10 RX ORDER — KETOROLAC TROMETHAMINE 30 MG/ML
INJECTION, SOLUTION INTRAMUSCULAR; INTRAVENOUS
Status: DISCONTINUED | OUTPATIENT
Start: 2024-07-10 | End: 2024-07-10

## 2024-07-10 RX ORDER — SODIUM CHLORIDE, SODIUM LACTATE, POTASSIUM CHLORIDE, CALCIUM CHLORIDE 600; 310; 30; 20 MG/100ML; MG/100ML; MG/100ML; MG/100ML
INJECTION, SOLUTION INTRAVENOUS CONTINUOUS
Status: DISCONTINUED | OUTPATIENT
Start: 2024-07-10 | End: 2024-07-10 | Stop reason: HOSPADM

## 2024-07-10 RX ORDER — OXYCODONE AND ACETAMINOPHEN 5; 325 MG/1; MG/1
1 TABLET ORAL
Status: DISCONTINUED | OUTPATIENT
Start: 2024-07-10 | End: 2024-07-10 | Stop reason: HOSPADM

## 2024-07-10 RX ORDER — TRAMADOL HYDROCHLORIDE 50 MG/1
50 TABLET ORAL EVERY 6 HOURS PRN
Qty: 10 TABLET | Refills: 0 | Status: SHIPPED | OUTPATIENT
Start: 2024-07-10

## 2024-07-10 RX ADMIN — DEXAMETHASONE SODIUM PHOSPHATE 8 MG: 4 INJECTION, SOLUTION INTRA-ARTICULAR; INTRALESIONAL; INTRAMUSCULAR; INTRAVENOUS; SOFT TISSUE at 12:07

## 2024-07-10 RX ADMIN — KETOROLAC TROMETHAMINE 15 MG: 30 INJECTION, SOLUTION INTRAMUSCULAR; INTRAVENOUS at 12:07

## 2024-07-10 RX ADMIN — ONDANSETRON 4 MG: 2 INJECTION INTRAMUSCULAR; INTRAVENOUS at 12:07

## 2024-07-10 RX ADMIN — MIDAZOLAM HYDROCHLORIDE 2 MG: 1 INJECTION, SOLUTION INTRAMUSCULAR; INTRAVENOUS at 11:07

## 2024-07-10 RX ADMIN — FAMOTIDINE 20 MG: 20 TABLET, FILM COATED ORAL at 10:07

## 2024-07-10 RX ADMIN — FENTANYL CITRATE 50 MCG: 50 INJECTION, SOLUTION INTRAMUSCULAR; INTRAVENOUS at 12:07

## 2024-07-10 RX ADMIN — ACETAMINOPHEN 1000 MG: 500 TABLET ORAL at 10:07

## 2024-07-10 RX ADMIN — SODIUM CHLORIDE, SODIUM LACTATE, POTASSIUM CHLORIDE, AND CALCIUM CHLORIDE: 600; 310; 30; 20 INJECTION, SOLUTION INTRAVENOUS at 11:07

## 2024-07-10 RX ADMIN — PROPOFOL 200 MG: 10 INJECTION, EMULSION INTRAVENOUS at 12:07

## 2024-07-10 RX ADMIN — LIDOCAINE HYDROCHLORIDE 90 MG: 20 INJECTION, SOLUTION EPIDURAL; INFILTRATION; INTRACAUDAL; PERINEURAL at 12:07

## 2024-07-10 NOTE — HPI
"65 y/o presents for D&C hysteroscopy.  Prior to US done 10/2/23, pt had a very short lived episode of light vaginal bleeding.  Bleeding has since resolved.  No pelvic pain.  Pt is , not on HRT.  PCP, Dr. Kelley, saw pt when she was having the bleeding, and noted it came from the cervical os.  Saw Noni Hobson, NP with our department who was going to do EMB, but patient was so nervous about the biopsy that Noni advised her to talk to me.    Pelvic ultrasound:  The uterus measures 6.0 cm in length.   The endometrial stripe in this postmenopausal patient measures 3 mm which is within normal limits.  The right ovary measures 2.7 x 1.4 x 1.4 cm.  The left ovary measures 5.1 x 4.4 x 4.6 cm. A simple left ovarian cyst measuring up to 4.3 cm is noted.  Vascular flow demonstrated to both ovaries.  Complex fluid is noted within the endometrial canal possibly representing blood by products.      Last pap: 10/11/23: normal, HPV neg"     At that appointment, pt declined EMB, and opted instead for repeat ultrasound in 2 months.  Repeat ultrasound was not done until recently on 5/21/24 and shows EMS 2mm, still with complex fluid/possible blood products within the endometrial canal, and now with 2.5cm hyperechoic focus within the endometrium.  4.9 cm simple left ovarian cyst, essentially stable in appearance     Pt denies any further bleeding episodes.  Notices a slight sensation in LLQ, but denies any significant pain there.    Pt declines in-office EMB, and prefers procedure under sedation for further evaluation of endometrial mass.  "

## 2024-07-10 NOTE — TELEPHONE ENCOUNTER
----- Message from Cora Simpson MD sent at 7/10/2024  1:15 PM CDT -----  Regarding: post-op visit  Please schedule post-op visit with me in 4 weeks

## 2024-07-10 NOTE — ASSESSMENT & PLAN NOTE
Proceed with D&C hysteroscopy as scheduled.  Consents reviewed in detail and signed in the office.  Pt agrees to proceed.  All questions answered.

## 2024-07-10 NOTE — ANESTHESIA POSTPROCEDURE EVALUATION
Anesthesia Post Evaluation    Patient: June Stevens    Procedure(s) Performed: Procedure(s) (LRB):  EXCISION, LEIOMYOMA, UTERUS, WITH DILATION AND CURETTAGE OF UTERUS (N/A)    Final Anesthesia Type: general      Patient location during evaluation: PACU  Patient participation: Yes- Able to Participate  Level of consciousness: awake  Post-procedure vital signs: reviewed and stable  Pain management: adequate  Airway patency: patent    PONV status at discharge: No PONV  Anesthetic complications: no      Cardiovascular status: stable  Respiratory status: unassisted  Hydration status: euvolemic  Follow-up not needed.              Vitals Value Taken Time   /66 07/10/24 1308   Temp 36.5 °C (97.7 °F) 07/10/24 1244   Pulse 65 07/10/24 1312   Resp 25 07/10/24 1312   SpO2 99 % 07/10/24 1312   Vitals shown include unfiled device data.      No case tracking events are documented in the log.      Pain/Allison Score: Pain Rating Prior to Med Admin: 0 (7/10/2024 10:00 AM)  Allison Score: 9 (7/10/2024  1:00 PM)

## 2024-07-10 NOTE — OP NOTE
Operative Note       SURGERY DATE:  7/10/2024     PRE-OP DIAGNOSIS:  Endometrial mass [N94.89]    POST-OP DIAGNOSIS:  Endometrial mass [N94.89]    Procedure(s) (LRB):  EXCISION, LEIOMYOMA, UTERUS, WITH DILATION AND CURETTAGE OF UTERUS (N/A)    Surgeons and Role:     * Cora Simpson MD - Primary    ASSISTANT:  None    TASKS PERFORMED BY ASSISTANT:  No assistant    ANESTHESIA: General    FINDINGS: uterus 8 week size;  clot present within endometrial cavity.  Visualization of endometrium was somewhat difficult due to the presence of the clot, which was removed with the Myosure.  Calcified mass arising near left cornual region with diffusely thick and calcified endometrium.  Fluid deficit 30 cc normal saline.    GRAFTS/IMPLANTS:  None    ESTIMATED BLOOD LOSS: 10 mL              COMPLICATIONS:  None    SPECIMEN:   1. Endometrial mass, 2. Endometrial curettings    DESCRIPTION OF PROCEDURE:    The patient was taken to the Operating Room where general       anesthesia was induced and found to be adequate.  Her perineum was then prepped and draped in normal sterile fashion, and bladder was drained in an in and out fashion with the red rubber catheter.   Time out was performed.                                                    A right angle retractor was  then placed in the patient's vagina, and the anterior lip of the cervix  was grasped with single-tooth tenaculum.  The uterine cervix was then gently  dilated up to 8 mm using a Hegar dilator.  The hysteroscope was advanced through the cervix to the uterine fundus using normal saline as the distension medium.  The findings stated above were noted.  Myosure Reach was used to remove the endometrial clot to help better visualize the endometrial cavity.  The findings stated above were noted.  Myosure Reach was used to remove the right cornual endometrial mass and to sample the surrounding endometrium in this area.  The hysteroscope was then removed, and sharp curettage was  performed of all 4 walls of the uterus.  The endometrial curettings were sent to pathology for permanent section.    The patient had no bleeding at the end of the case. The single-tooth tenaculum was removed from the anterior lip of the cervix and excellent hemostasis was noted.  The weighted speculum was then removed.   Sponge, lap and needle counts were correct  X 2.  She will go to Recovery in stable condition.               CONDITION: Good    DISPOSITION: PACU - hemodynamically stable.

## 2024-07-10 NOTE — ANESTHESIA PROCEDURE NOTES
Intubation    Date/Time: 7/10/2024 12:03 PM    Performed by: Shyanne Porter CRNA  Authorized by: Moe Christianson MD    Intubation:     Induction:  Intravenous    Intubated:  Postinduction    Mask Ventilation:  Not attempted    Attempts:  1    Attempted By:  CRNA    Difficult Airway Encountered?: No      Complications:  None    Airway Device:  Supraglottic airway/LMA (iGel)    Airway Device Size:  4.0    Style/Cuff Inflation:  Uncuffed    Secured at:  The lips    Placement Verified By:  Capnometry    Complicating Factors:  None    Findings Post-Intubation:  BS equal bilateral and atraumatic/condition of teeth unchanged

## 2024-07-10 NOTE — H&P
"[facility]  Obstetrics & Gynecology  History & Physical    Patient Name: June Stevens  MRN: [id number]  Admission Date: 7/10/2024  Primary Care Provider: June Kelley MD    Subjective:     Chief Complaint/Reason for Admission: endometrial mass    History of Present Illness:  65 y/o presents for D&C hysteroscopy.  Prior to US done 10/2/23, pt had a very short lived episode of light vaginal bleeding.  Bleeding has since resolved.  No pelvic pain.  Pt is , not on HRT.  PCP, Dr. Kelley, saw pt when she was having the bleeding, and noted it came from the cervical os.  Saw Noni Hobson, GEGE with our department who was going to do EMB, but patient was so nervous about the biopsy that Noni advised her to talk to me.    Pelvic ultrasound:  The uterus measures 6.0 cm in length.   The endometrial stripe in this postmenopausal patient measures 3 mm which is within normal limits.  The right ovary measures 2.7 x 1.4 x 1.4 cm.  The left ovary measures 5.1 x 4.4 x 4.6 cm. A simple left ovarian cyst measuring up to 4.3 cm is noted.  Vascular flow demonstrated to both ovaries.  Complex fluid is noted within the endometrial canal possibly representing blood by products.      Last pap: 10/11/23: normal, HPV neg"     At that appointment, pt declined EMB, and opted instead for repeat ultrasound in 2 months.  Repeat ultrasound was not done until recently on 5/21/24 and shows EMS 2mm, still with complex fluid/possible blood products within the endometrial canal, and now with 2.5cm hyperechoic focus within the endometrium.  4.9 cm simple left ovarian cyst, essentially stable in appearance     Pt denies any further bleeding episodes.  Notices a slight sensation in LLQ, but denies any significant pain there.    Pt declines in-office EMB, and prefers procedure under sedation for further evaluation of endometrial mass.    No current facility-administered medications on file prior to encounter.     Current " Outpatient Medications on File Prior to Encounter   Medication Sig    levothyroxine (SYNTHROID) 25 MCG tablet Take 1 tablet (25 mcg total) by mouth before breakfast.    pantoprazole (PROTONIX) 40 MG tablet TAKE 1 TABLET BY MOUTH EVERY MORNING 30 MINUTES BEFORE EATING MEAL    ibuprofen (ADVIL,MOTRIN) 800 MG tablet Take 1 tablet (800 mg total) by mouth 3 (three) times daily as needed.    valACYclovir (VALTREX) 1000 MG tablet Take 2 tablets (2,000 mg total) by mouth every 12 (twelve) hours.       Review of patient's allergies indicates:   Allergen Reactions    Demerol  [meperidine]      Other reaction(s): Vomiting       Past Medical History:   Diagnosis Date    Anxiety     Bipolar disorder     Generalized anxiety disorder     GERD (gastroesophageal reflux disease)     Hyperlipidemia     Obesity     Preop cardiovascular exam 2024    Thyroid disease     Urinary incontinence      OB History    Para Term  AB Living   0 0 0 0 0 0   SAB IAB Ectopic Multiple Live Births   0 0 0 0 0     Past Surgical History:   Procedure Laterality Date    CHOLECYSTECTOMY      COLONOSCOPY N/A 2022    Procedure: COLONOSCOPY;  Surgeon: Ling Mustafa MD;  Location: [facility];  Service: Endoscopy;  Laterality: N/A;    INCONTINENCE SURGERY      LAPAROSCOPIC SLEEVE GASTRECTOMY  2008    LASIK       Family History       Problem Relation (Age of Onset)    COPD Mother    Diabetes Father, Maternal Grandmother    Diabetes Mellitus Maternal Aunt    Heart disease Maternal Grandmother    Heart failure Mother    Hypertension Mother, Maternal Grandmother    Macular degeneration Mother    No Known Problems Sister    Other Brother    Retinal detachment Mother          Tobacco Use    Smoking status: Never    Smokeless tobacco: Never   Substance and Sexual Activity    Alcohol use: No     Comment: Past history of alcohol abuse    Drug use: No    Sexual activity: Not Currently     Partners: Female     Review of Systems    Constitutional:  Negative for fatigue, fever and unexpected weight change.   Gastrointestinal:  Negative for abdominal pain, bloating, blood in stool, constipation, diarrhea, nausea and vomiting.   Endocrine: Negative for hot flashes.   Genitourinary:  Positive for postmenopausal bleeding (1 episode as mentioned in HPI; no recent bleeding). Negative for decreased libido, dyspareunia, dysuria, flank pain, frequency, genital sores, hematuria, pelvic pain, urgency, vaginal bleeding, vaginal discharge, vaginal pain, urinary incontinence and vaginal odor.   Integumentary:  Negative for rash and hair changes.   Psychiatric/Behavioral:  Negative for depression. The patient is nervous/anxious.      Objective:     Vital Signs (Most Recent):    Vital Signs (24h Range):        Weight: 90.7 kg (200 lb)  Body mass index is 31.32 kg/m².  Patient's last menstrual period was 07/29/2010 (approximate).     Physical Exam:   Constitutional: She is oriented to person, place, and time. She appears well-developed and well-nourished. No distress.    HENT:   Head: Normocephalic and atraumatic.     Neck: No thyromegaly present.     Pulmonary/Chest: Effort normal.        Abdominal: Soft. She exhibits no distension and no mass. There is no abdominal tenderness. There is no rebound and no guarding.             Musculoskeletal: No edema.       Neurological: She is alert and oriented to person, place, and time.    Skin: No rash noted.    Psychiatric: She has a normal mood and affect. Her behavior is normal. Judgment and thought content normal.        Laboratory:  Recent Lab Results       None          Lab Results   Component Value Date    WBC 4.65 07/03/2024    HGB 12.4 07/03/2024    HCT 40.0 07/03/2024    MCV 92 07/03/2024     07/03/2024       BMP  Lab Results   Component Value Date     07/03/2024    K 3.9 07/03/2024     07/03/2024    CO2 25 07/03/2024    BUN 11 07/03/2024    CREATININE 0.8 07/03/2024    CALCIUM 8.8  07/03/2024    ANIONGAP 8 07/03/2024    EGFRNORACEVR >60.0 07/03/2024         Diagnostic Results:  ECG: Reviewed  US: Reviewed  Assessment/Plan:     * Endometrial mass  Proceed with D&C hysteroscopy as scheduled.  Consents reviewed in detail and signed in the office.  Pt agrees to proceed.  All questions answered.        Cora Simpson MD  Obstetrics & Gynecology  [facility]

## 2024-07-10 NOTE — BRIEF OP NOTE
[facility] Per[facility]  Brief Operative Note    Surgery Date: 7/10/2024     Surgeons and Role:     * Cora Simpson MD - Primary    Assisting Surgeon: None    Pre-op Diagnosis:  Endometrial mass [N94.89]    Post-op Diagnosis:  Post-Op Diagnosis Codes:     * Endometrial mass [N94.89]    Procedure(s) (LRB):  EXCISION, LEIOMYOMA, UTERUS, WITH DILATION AND CURETTAGE OF UTERUS (N/A)    Anesthesia: General    Operative Findings: uterus 8 week size;  clot present within endometrial cavity.  Visualization of endometrium was somewhat difficult due to the presence of the clot, which was removed with the Myosure.  Calcified mass arising near left cornual region with diffusely thick and calcified endometrium.  Fluid deficit 30 cc normal saline.    Estimated Blood Loss: * 10 mL *         Specimens:   Endometrial mass  Endometrial curettings        Discharge Note    OUTCOME: Patient tolerated treatment/procedure well without complication and is now ready for discharge.    DISPOSITION: Home or Self Care    FINAL DIAGNOSIS:  Endometrial mass    FOLLOWUP: In clinic    DISCHARGE INSTRUCTIONS:    Discharge Procedure Orders   Diet Adult Regular     No dressing needed     Pelvic Rest     Notify your health care provider if you experience any of the following:  temperature >100.4     Notify your health care provider if you experience any of the following:  persistent nausea and vomiting or diarrhea     Notify your health care provider if you experience any of the following:  severe uncontrolled pain     Notify your health care provider if you experience any of the following:  redness, tenderness, or signs of infection (pain, swelling, redness, odor or green/yellow discharge around incision site)     Notify your health care provider if you experience any of the following:  difficulty breathing or increased cough     Notify your health care provider if you experience any of the following:  severe persistent headache     Notify your  health care provider if you experience any of the following:  worsening rash     Notify your health care provider if you experience any of the following:  persistent dizziness, light-headedness, or visual disturbances     Notify your health care provider if you experience any of the following:  increased confusion or weakness        Clinical Reference Documents Added to Patient Instructions         Document    HYSTEROSCOPY DISCHARGE INSTRUCTIONS (ENGLISH)

## 2024-07-10 NOTE — SUBJECTIVE & OBJECTIVE
No current facility-administered medications on file prior to encounter.     Current Outpatient Medications on File Prior to Encounter   Medication Sig    levothyroxine (SYNTHROID) 25 MCG tablet Take 1 tablet (25 mcg total) by mouth before breakfast.    pantoprazole (PROTONIX) 40 MG tablet TAKE 1 TABLET BY MOUTH EVERY MORNING 30 MINUTES BEFORE EATING MEAL    ibuprofen (ADVIL,MOTRIN) 800 MG tablet Take 1 tablet (800 mg total) by mouth 3 (three) times daily as needed.    valACYclovir (VALTREX) 1000 MG tablet Take 2 tablets (2,000 mg total) by mouth every 12 (twelve) hours.       Review of patient's allergies indicates:   Allergen Reactions    Demerol  [meperidine]      Other reaction(s): Vomiting       Past Medical History:   Diagnosis Date    Anxiety     Bipolar disorder     Generalized anxiety disorder     GERD (gastroesophageal reflux disease)     Hyperlipidemia     Obesity     Preop cardiovascular exam 2024    Thyroid disease     Urinary incontinence      OB History    Para Term  AB Living   0 0 0 0 0 0   SAB IAB Ectopic Multiple Live Births   0 0 0 0 0     Past Surgical History:   Procedure Laterality Date    CHOLECYSTECTOMY      COLONOSCOPY N/A 2022    Procedure: COLONOSCOPY;  Surgeon: Ling Mustafa MD;  Location: [facility];  Service: Endoscopy;  Laterality: N/A;    INCONTINENCE SURGERY      LAPAROSCOPIC SLEEVE GASTRECTOMY  2008    LASIK       Family History       Problem Relation (Age of Onset)    COPD Mother    Diabetes Father, Maternal Grandmother    Diabetes Mellitus Maternal Aunt    Heart disease Maternal Grandmother    Heart failure Mother    Hypertension Mother, Maternal Grandmother    Macular degeneration Mother    No Known Problems Sister    Other Brother    Retinal detachment Mother          Tobacco Use    Smoking status: Never    Smokeless tobacco: Never   Substance and Sexual Activity    Alcohol use: No     Comment: Past history of alcohol abuse    Drug use: No     Sexual activity: Not Currently     Partners: Female     Review of Systems   Constitutional:  Negative for fatigue, fever and unexpected weight change.   Gastrointestinal:  Negative for abdominal pain, bloating, blood in stool, constipation, diarrhea, nausea and vomiting.   Endocrine: Negative for hot flashes.   Genitourinary:  Positive for postmenopausal bleeding (1 episode as mentioned in HPI; no recent bleeding). Negative for decreased libido, dyspareunia, dysuria, flank pain, frequency, genital sores, hematuria, pelvic pain, urgency, vaginal bleeding, vaginal discharge, vaginal pain, urinary incontinence and vaginal odor.   Integumentary:  Negative for rash and hair changes.   Psychiatric/Behavioral:  Negative for depression. The patient is nervous/anxious.      Objective:     Vital Signs (Most Recent):    Vital Signs (24h Range):        Weight: 90.7 kg (200 lb)  Body mass index is 31.32 kg/m².  Patient's last menstrual period was 07/29/2010 (approximate).     Physical Exam:   Constitutional: She is oriented to person, place, and time. She appears well-developed and well-nourished. No distress.    HENT:   Head: Normocephalic and atraumatic.     Neck: No thyromegaly present.     Pulmonary/Chest: Effort normal.        Abdominal: Soft. She exhibits no distension and no mass. There is no abdominal tenderness. There is no rebound and no guarding.             Musculoskeletal: No edema.       Neurological: She is alert and oriented to person, place, and time.    Skin: No rash noted.    Psychiatric: She has a normal mood and affect. Her behavior is normal. Judgment and thought content normal.        Laboratory:  Recent Lab Results       None          Lab Results   Component Value Date    WBC 4.65 07/03/2024    HGB 12.4 07/03/2024    HCT 40.0 07/03/2024    MCV 92 07/03/2024     07/03/2024       BMP  Lab Results   Component Value Date     07/03/2024    K 3.9 07/03/2024     07/03/2024    CO2 25 07/03/2024     BUN 11 07/03/2024    CREATININE 0.8 07/03/2024    CALCIUM 8.8 07/03/2024    ANIONGAP 8 07/03/2024    EGFRNORACEVR >60.0 07/03/2024         Diagnostic Results:  ECG: Reviewed  US: Reviewed

## 2024-07-10 NOTE — TRANSFER OF CARE
"Anesthesia Transfer of Care Note    Patient: June Stevens    Procedure(s) Performed: Procedure(s) (LRB):  EXCISION, LEIOMYOMA, UTERUS, WITH DILATION AND CURETTAGE OF UTERUS (N/A)    Patient location: PACU    Anesthesia Type: general    Transport from OR: Transported from OR on room air with adequate spontaneous ventilation    Post pain: adequate analgesia    Post assessment: no apparent anesthetic complications and tolerated procedure well    Post vital signs: stable    Level of consciousness: responds to stimulation    Nausea/Vomiting: no nausea/vomiting    Complications: none    Transfer of care protocol was followed      Last vitals: Visit Vitals  /64 (BP Location: Right arm, Patient Position: Sitting)   Pulse 72   Temp 37 °C (98.6 °F) (Temporal)   Resp 18   Ht 5' 7" (1.702 m)   Wt 91.1 kg (200 lb 13.4 oz)   LMP 07/29/2010 (Approximate)   SpO2 100%   Breastfeeding No   BMI 31.46 kg/m²     "

## 2024-07-11 VITALS
BODY MASS INDEX: 31.52 KG/M2 | HEIGHT: 67 IN | OXYGEN SATURATION: 100 % | SYSTOLIC BLOOD PRESSURE: 128 MMHG | RESPIRATION RATE: 16 BRPM | HEART RATE: 60 BPM | DIASTOLIC BLOOD PRESSURE: 66 MMHG | TEMPERATURE: 98 F | WEIGHT: 200.81 LBS

## 2024-07-12 ENCOUNTER — PATIENT MESSAGE (OUTPATIENT)
Dept: FAMILY MEDICINE | Facility: CLINIC | Age: 64
End: 2024-07-12
Payer: COMMERCIAL

## 2024-07-13 ENCOUNTER — PATIENT MESSAGE (OUTPATIENT)
Dept: OBSTETRICS AND GYNECOLOGY | Facility: CLINIC | Age: 64
End: 2024-07-13
Payer: COMMERCIAL

## 2024-07-13 ENCOUNTER — TELEPHONE (OUTPATIENT)
Dept: OBSTETRICS AND GYNECOLOGY | Facility: HOSPITAL | Age: 64
End: 2024-07-13
Payer: COMMERCIAL

## 2024-07-13 DIAGNOSIS — C54.1 ENDOMETRIAL CANCER: Primary | ICD-10-CM

## 2024-07-13 NOTE — TELEPHONE ENCOUNTER
Called pt and reviewed pathology from D&C hysteroscopy showing endometrial cancer.  Discussed need for referral to gyn/oncology.  Reviewed referring to gyn/onc group at [facility] vs gyn/onc group at [facility].  Pt will discuss options with her partner, and will let me know what they decide.

## 2024-07-14 ENCOUNTER — NURSE TRIAGE (OUTPATIENT)
Dept: ADMINISTRATIVE | Facility: CLINIC | Age: 64
End: 2024-07-14
Payer: COMMERCIAL

## 2024-07-14 ENCOUNTER — PATIENT MESSAGE (OUTPATIENT)
Dept: FAMILY MEDICINE | Facility: CLINIC | Age: 64
End: 2024-07-14
Payer: COMMERCIAL

## 2024-07-14 ENCOUNTER — PATIENT MESSAGE (OUTPATIENT)
Dept: OBSTETRICS AND GYNECOLOGY | Facility: CLINIC | Age: 64
End: 2024-07-14
Payer: COMMERCIAL

## 2024-07-14 NOTE — TELEPHONE ENCOUNTER
[place]    PCP:  Dr. June Kelley    Pt escalated to triage queue for Post op, questions on if she can go swimming.  NT contacted pt and she states I read the paperwork and see that it says not to bath or get in a hot tub.  NT reinforced not to swim, bath, or get in a hot tub as directed until she sees her Surgeon and they okay it.  Pt VU and had no further questions at this time.  Advised to call for worsening/questions/concerns.  VU.    Reason for Disposition   Health information question, no triage required and triager able to answer question    Additional Information   Negative: Triager needs further essential information from caller in order to complete triage   Negative: [1] Caller requesting NON-URGENT health information AND [2] PCP's office is the best resource   Negative: Requesting regular office appointment    Protocols used: Information Only Call - No Triage-A-

## 2024-07-15 ENCOUNTER — TELEPHONE (OUTPATIENT)
Dept: GYNECOLOGIC ONCOLOGY | Facility: CLINIC | Age: 64
End: 2024-07-15
Payer: COMMERCIAL

## 2024-07-15 LAB
COMMENT: ABNORMAL
FINAL PATHOLOGIC DIAGNOSIS: ABNORMAL
GROSS: ABNORMAL
Lab: ABNORMAL
SUPPLEMENTAL DIAGNOSIS: ABNORMAL

## 2024-07-15 NOTE — TELEPHONE ENCOUNTER
Called pt back regarding questions about new diagnosis of endometrial cancer.    All questions answered.  Pt wants to go to gyn/onc in [place] at [facility].  Please send referral, my last progress note, my op note, and pathology from surgery to gyn/onc at [facility] and let pt know when it has been sent.

## 2024-07-16 ENCOUNTER — PATIENT MESSAGE (OUTPATIENT)
Dept: PSYCHIATRY | Facility: CLINIC | Age: 64
End: 2024-07-16
Payer: COMMERCIAL

## 2024-07-16 ENCOUNTER — PATIENT MESSAGE (OUTPATIENT)
Dept: OBSTETRICS AND GYNECOLOGY | Facility: CLINIC | Age: 64
End: 2024-07-16
Payer: COMMERCIAL

## 2024-08-01 ENCOUNTER — TELEPHONE (OUTPATIENT)
Dept: PSYCHIATRY | Facility: CLINIC | Age: 64
End: 2024-08-01
Payer: COMMERCIAL

## 2024-08-02 ENCOUNTER — PATIENT MESSAGE (OUTPATIENT)
Dept: FAMILY MEDICINE | Facility: CLINIC | Age: 64
End: 2024-08-02
Payer: COMMERCIAL

## 2024-08-05 ENCOUNTER — PATIENT MESSAGE (OUTPATIENT)
Dept: OBSTETRICS AND GYNECOLOGY | Facility: CLINIC | Age: 64
End: 2024-08-05
Payer: COMMERCIAL

## 2024-08-05 ENCOUNTER — TELEPHONE (OUTPATIENT)
Dept: OBSTETRICS AND GYNECOLOGY | Facility: CLINIC | Age: 64
End: 2024-08-05
Payer: COMMERCIAL

## 2024-08-05 ENCOUNTER — OFFICE VISIT (OUTPATIENT)
Dept: PSYCHIATRY | Facility: CLINIC | Age: 64
End: 2024-08-05
Payer: COMMERCIAL

## 2024-08-05 ENCOUNTER — LAB VISIT (OUTPATIENT)
Dept: LAB | Facility: HOSPITAL | Age: 64
End: 2024-08-05
Attending: FAMILY MEDICINE
Payer: COMMERCIAL

## 2024-08-05 DIAGNOSIS — Z63.9 FAMILY CIRCUMSTANCE: ICD-10-CM

## 2024-08-05 DIAGNOSIS — E03.9 ACQUIRED HYPOTHYROIDISM: ICD-10-CM

## 2024-08-05 DIAGNOSIS — F41.9 ANXIETY: ICD-10-CM

## 2024-08-05 DIAGNOSIS — F31.62 BIPOLAR MIXED AFFECTIVE DISORDER, MODERATE: Primary | ICD-10-CM

## 2024-08-05 DIAGNOSIS — G47.00 INSOMNIA, UNSPECIFIED TYPE: ICD-10-CM

## 2024-08-05 LAB — TSH SERPL DL<=0.005 MIU/L-ACNC: 1.71 UIU/ML (ref 0.4–4)

## 2024-08-05 PROCEDURE — 99999 PR PBB SHADOW E&M-EST. PATIENT-LVL I: CPT | Mod: PBBFAC,,, | Performed by: SOCIAL WORKER

## 2024-08-05 PROCEDURE — 90834 PSYTX W PT 45 MINUTES: CPT | Mod: S$GLB,,, | Performed by: SOCIAL WORKER

## 2024-08-05 PROCEDURE — 36415 COLL VENOUS BLD VENIPUNCTURE: CPT | Performed by: FAMILY MEDICINE

## 2024-08-05 PROCEDURE — 84443 ASSAY THYROID STIM HORMONE: CPT | Performed by: FAMILY MEDICINE

## 2024-08-05 SDOH — SOCIAL DETERMINANTS OF HEALTH (SDOH): PROBLEM RELATED TO PRIMARY SUPPORT GROUP, UNSPECIFIED: Z63.9

## 2024-08-06 ENCOUNTER — PATIENT MESSAGE (OUTPATIENT)
Dept: FAMILY MEDICINE | Facility: CLINIC | Age: 64
End: 2024-08-06
Payer: COMMERCIAL

## 2024-08-12 ENCOUNTER — PATIENT MESSAGE (OUTPATIENT)
Dept: OBSTETRICS AND GYNECOLOGY | Facility: CLINIC | Age: 64
End: 2024-08-12
Payer: COMMERCIAL

## 2024-08-14 ENCOUNTER — OFFICE VISIT (OUTPATIENT)
Dept: OBSTETRICS AND GYNECOLOGY | Facility: CLINIC | Age: 64
End: 2024-08-14
Payer: COMMERCIAL

## 2024-08-14 VITALS
WEIGHT: 199.94 LBS | BODY MASS INDEX: 31.38 KG/M2 | HEIGHT: 67 IN | SYSTOLIC BLOOD PRESSURE: 134 MMHG | DIASTOLIC BLOOD PRESSURE: 78 MMHG

## 2024-08-14 DIAGNOSIS — C54.1 ENDOMETRIAL CANCER: ICD-10-CM

## 2024-08-14 DIAGNOSIS — Z09 STATUS POST GYNECOLOGICAL SURGERY, FOLLOW-UP EXAM: Primary | ICD-10-CM

## 2024-08-14 PROCEDURE — 3075F SYST BP GE 130 - 139MM HG: CPT | Mod: CPTII,S$GLB,, | Performed by: OBSTETRICS & GYNECOLOGY

## 2024-08-14 PROCEDURE — 1159F MED LIST DOCD IN RCRD: CPT | Mod: CPTII,S$GLB,, | Performed by: OBSTETRICS & GYNECOLOGY

## 2024-08-14 PROCEDURE — 3078F DIAST BP <80 MM HG: CPT | Mod: CPTII,S$GLB,, | Performed by: OBSTETRICS & GYNECOLOGY

## 2024-08-14 PROCEDURE — 99024 POSTOP FOLLOW-UP VISIT: CPT | Mod: S$GLB,,, | Performed by: OBSTETRICS & GYNECOLOGY

## 2024-08-14 PROCEDURE — 99999 PR PBB SHADOW E&M-EST. PATIENT-LVL III: CPT | Mod: PBBFAC,,, | Performed by: OBSTETRICS & GYNECOLOGY

## 2024-08-14 NOTE — PROGRESS NOTES
Subjective     Patient ID: June Stevens is a 64 y.o. female.    Chief Complaint:  Post-op Evaluation      History of Present Illness  HPI  Presents for post-op visit.  Pt is s/p D&C hysteroscopy 7/10/24 for endometrial mass.  Pathology with FIGO Grade 2 endometrial adenocarcinoma.  Pt has seen gyn/onc at [facility], and has hysterectomy/staging surgery planned next month.  Is hoping to move her surgery date a little sooner, and is in contact with the  at [facility] gyn/onc.  Since hysteroscopy, she has had some mild pelvic cramping, and has intermittent vaginal bleeding.  No heavy bleeding.    GYN & OB History  Patient's last menstrual period was 2010 (approximate).   Date of Last Pap: 10/16/2023    OB History    Para Term  AB Living   0 0 0 0 0 0   SAB IAB Ectopic Multiple Live Births   0 0 0 0 0       Review of Systems  Review of Systems       Objective   Physical Exam:   Constitutional: She is oriented to person, place, and time. She appears well-developed and well-nourished. No distress.                           Neurological: She is alert and oriented to person, place, and time.     Psychiatric: She has a normal mood and affect. Her behavior is normal. Judgment and thought content normal.            Assessment and Plan     1. Status post gynecological surgery, follow-up exam    2. Endometrial cancer             Plan:  June was seen today for post-op evaluation.    Diagnoses and all orders for this visit:    Status post gynecological surgery, follow-up exam    Endometrial cancer     Reviewed findings with the patient.  Some bleeding will likely continue until hysterectomy is done.    All questions answered.  RTC with me once released by gyn/onc

## 2024-08-15 ENCOUNTER — PATIENT MESSAGE (OUTPATIENT)
Dept: PSYCHIATRY | Facility: CLINIC | Age: 64
End: 2024-08-15
Payer: COMMERCIAL

## 2024-08-17 ENCOUNTER — PATIENT MESSAGE (OUTPATIENT)
Dept: PSYCHIATRY | Facility: CLINIC | Age: 64
End: 2024-08-17
Payer: COMMERCIAL

## 2024-08-19 RX ORDER — MIRTAZAPINE 15 MG/1
TABLET, FILM COATED ORAL
Qty: 90 TABLET | Refills: 1 | Status: SHIPPED | OUTPATIENT
Start: 2024-08-19

## 2024-09-05 ENCOUNTER — TELEPHONE (OUTPATIENT)
Dept: PSYCHIATRY | Facility: CLINIC | Age: 64
End: 2024-09-05
Payer: COMMERCIAL

## 2024-09-24 ENCOUNTER — PATIENT MESSAGE (OUTPATIENT)
Dept: FAMILY MEDICINE | Facility: CLINIC | Age: 64
End: 2024-09-24
Payer: COMMERCIAL

## 2024-11-09 ENCOUNTER — PATIENT MESSAGE (OUTPATIENT)
Dept: FAMILY MEDICINE | Facility: CLINIC | Age: 64
End: 2024-11-09
Payer: COMMERCIAL

## 2024-11-09 ENCOUNTER — PATIENT MESSAGE (OUTPATIENT)
Dept: OBSTETRICS AND GYNECOLOGY | Facility: CLINIC | Age: 64
End: 2024-11-09
Payer: COMMERCIAL

## 2024-11-11 ENCOUNTER — PATIENT MESSAGE (OUTPATIENT)
Dept: OBSTETRICS AND GYNECOLOGY | Facility: CLINIC | Age: 64
End: 2024-11-11
Payer: COMMERCIAL

## 2024-11-19 ENCOUNTER — PATIENT MESSAGE (OUTPATIENT)
Dept: PSYCHIATRY | Facility: CLINIC | Age: 64
End: 2024-11-19
Payer: COMMERCIAL

## 2024-11-19 ENCOUNTER — TELEPHONE (OUTPATIENT)
Dept: FAMILY MEDICINE | Facility: CLINIC | Age: 64
End: 2024-11-19
Payer: COMMERCIAL

## 2024-11-19 RX ORDER — FLUVOXAMINE MALEATE 50 MG/1
50 TABLET, FILM COATED ORAL NIGHTLY
Qty: 90 TABLET | Refills: 0 | Status: SHIPPED | OUTPATIENT
Start: 2024-11-19

## 2024-11-19 NOTE — TELEPHONE ENCOUNTER
----- Message from Popzandra sent at 11/19/2024  9:33 AM CST -----  Contact: [phone]  Type:  Sooner Apoointment Request    Caller is requesting a sooner appointment.  Caller declined first available appointment listed below.  Caller will not accept being placed on the waitlist and is requesting a message be sent to doctor.  Name of Caller:STEVE GRANT [[id number]]  When is the first available appointment?as soon as possible  Symptoms:flu shot  Would the patient rather a call back or a response via [facility]? Call back  Best Call Back Number: [phone]  Additional Information: n [phone]

## 2024-12-02 ENCOUNTER — OFFICE VISIT (OUTPATIENT)
Dept: PSYCHIATRY | Facility: CLINIC | Age: 64
End: 2024-12-02
Payer: COMMERCIAL

## 2024-12-02 DIAGNOSIS — F41.9 ANXIETY: ICD-10-CM

## 2024-12-02 DIAGNOSIS — G47.00 INSOMNIA, UNSPECIFIED TYPE: ICD-10-CM

## 2024-12-02 DIAGNOSIS — F31.62 BIPOLAR MIXED AFFECTIVE DISORDER, MODERATE: Primary | ICD-10-CM

## 2024-12-02 DIAGNOSIS — F31.30 BIPOLAR DISORDER, MOST RECENT EPISODE DEPRESSED: Primary | ICD-10-CM

## 2024-12-02 PROCEDURE — 99999 PR PBB SHADOW E&M-EST. PATIENT-LVL I: CPT | Mod: PBBFAC,,, | Performed by: SOCIAL WORKER

## 2024-12-02 PROCEDURE — 90834 PSYTX W PT 45 MINUTES: CPT | Mod: S$GLB,,, | Performed by: SOCIAL WORKER

## 2024-12-02 PROCEDURE — 99214 OFFICE O/P EST MOD 30 MIN: CPT | Mod: 95,,, | Performed by: PSYCHIATRY & NEUROLOGY

## 2024-12-02 RX ORDER — MIRTAZAPINE 15 MG/1
TABLET, FILM COATED ORAL
Qty: 90 TABLET | Refills: 0 | Status: SHIPPED | OUTPATIENT
Start: 2024-12-02

## 2024-12-02 RX ORDER — LAMOTRIGINE 150 MG/1
150 TABLET ORAL DAILY
Qty: 90 TABLET | Refills: 0 | Status: SHIPPED | OUTPATIENT
Start: 2024-12-02 | End: 2025-12-02

## 2024-12-02 RX ORDER — BUSPIRONE HYDROCHLORIDE 30 MG/1
TABLET ORAL
Qty: 180 TABLET | Refills: 0 | Status: SHIPPED | OUTPATIENT
Start: 2024-12-02

## 2024-12-02 RX ORDER — FLUVOXAMINE MALEATE 50 MG/1
50 TABLET, FILM COATED ORAL NIGHTLY
Qty: 90 TABLET | Refills: 1 | Status: SHIPPED | OUTPATIENT
Start: 2024-12-02

## 2024-12-02 NOTE — PROGRESS NOTES
"Outpatient Psychiatry Follow-up Visit (MD/NP)    12/2/2024    June Stevens, a 64 y.o. female, presenting for follow-up visit. Met with patient.    Reason for Encounter: Chief complaint/reason for encounter: addictive disorder, depression, mood swings, anxiety, behavior and interpersonal.     Interval Hx: Patient seen and interviewed for follow-up, about six months since last visit. This was a VIDEO VISIT. She was at home. Reports diagnosis of endometrial cancer, underwent hysterectomy + radiation. To have q 6 months follow-up. Reports having returned to work in mid-November and has now returned to all of her usual activities. Says process as been manageable. On no different meds now. Continuing to participate in outpatient psychotherapy and has an upcoming appointment. Starting a new exercise regimen and using a . Some holiday triggers anticipated. Adherent with medication. Describes ongoing benefits. Denies side effects.     Background: Pt is a 60 y/o F who presents for establishment of psychiatric care, previously evaluated by Mr. Junito Costa. From his note: reporting chief complaint of "my partner thought since I am getting medication, I ought probably to have some talk therapy as well." Pt described a history of mood swings & recurrent depression going back to her teenage years, a recurrent struggle with anger & angry outbursts, & poor coping skills, relying too much on binge drinking (less than weekly, but always to a point of heavy intoxication) to blot out anger & anxiety. She described a fragile self-esteem, pattern of defensiveness, heightened irritability, & frequent depressive cycles. She believes she has abandonment issues from her parents having left for [place] abruptly when she was just 4 years old, leaving her & her younger sister with a grandmother. Mom returned alone after 1 year but never fully embodied a parental figure to the pt. Her grandmother, who represented " "stability,  when the pt was 27. She recalls mood issues going back to Jr High age, age 12 or 13. She said that was when social issues at school started to become more difficult as she & those around her started noticing she wasn't conforming to stereotypical feminine norms & came to be suspected of being a lesbian. She described an emotional struggle between her Muslim beliefs & growing awareness of her same-sex orientation. She said she tried to date boys. She reported a lot of peer rejection, being known as "a alcides lesbian," & she attempted suicide at age 17, stepping out in front of an oncoming car. By age 19 she had been introduced to the bhandari bar scene, which she experienced as a crucial affirmation that she isn't alone, but which met with her mother's disapproval; it took her mother years to become more accepting. She described her current living situation as a split between 2 households, 1 with her partner of 25 years, who is older & "very controlling," & the other with the pt's mother. She reports tension in her primary relationship that involves infidelity on her part back in , an affair stemming from a high school reunion. She said her partner now polices her, & she feels resentful but also doesn't want to leave her partner over it. The pt denied any suicidal or homicidal ideation, though endorsing remote history of a suicidal episode as a teenager. Denied any cognitive deficit, psychosis, mood swings, or recreational drug use. Endorsed past struggle with some alcohol abuse, a period of abstinence with AA participation, & now periodic binges. Endorses struggle with explosive anger. Identified therapeutic goals include reduction of depression & anxiety, improved self-esteem, & coping skills, including anger management, assertiveness communication skills.        Symptoms:   Mood: depressed mood, diminished interest and worthlessness/guilt  Anxiety: excessive anxiety/worry, restlessness/keyed up and " "irritability  Substance abuse: substance tolerance and take more than intended  Cognitive functioning: denied  Health behaviors: some alcohol binges     Psychiatric history: psychotropic management by PCP & prior suicide attempt(s); no chemical abuse treatment reported but did attend AA for years & had a sponsor; now relapsed with a binge pattern of use. Family history of psychiatric illness: suspects father bipolar (never diagnosed); sister with clinical anxiety. Social history: See history above; oldest of 3; younger brother drowned as an infant. Younger sister is her closest friend. Chaotic childhood; parents were very young; left pt & younger sister with grandmother when she was 4; father never returned; mother came back after 1 year. Grandmother, an aunt, & uncle were parental figures. Pt described growing social tension in school in middle school & high school, related to her budding identity as a masculine lesbian. Reported running away from school & home several times in teens. Attempted suicide at 17. Discovered bhandari bars at 19 found the start of a sort of community. 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 Current committed relationship of the past 25 years; described partner as controling, domineering, but "she takes care of me." Endorsed tension of pt's past infidelity a decade ago. Previous longest relationship was for 5 years. No biological children of her own. Never in a relationship with a man. Loves animals; finds them easier to relate to than people. Currently lives between 2 houses, one with her partner, the other with her mother.       Substance use:   Alcohol: binge pattern of abuse; denied any known health damage from alcohol   Drugs: none   Tobacco: none   Caffeine: average of 2 and a half cups of coffee daily    Pt confirms the above history. Reports that she's been anxious her "entire life"; overriding distress & impairment. Past dx'es include " "depression, bipolar disorder (some impulsive shopping). Meds through Dr. Bee Landon for many years, but stopped seeing Dr. Zhong's in summer 2019. Found treatment unaffordable. Off medicine for 3-4 months. "had some left but thought it wasn't working, it makes me gain weight, & I'm sluggish". Thinks that these things improved off the medication.     "got an ultimatum" to get back onto medication by her partner who patient says has observed that patient is less irritable & agitated on medication (but she doesn't see this herself).     Endorses "yes" to most items on MDQ    Psych Hx: "anger issues" since a kid. "fought a lot", "usually defending other people", "I couldn't verbally say what I felt, so I thought", though rarely disciplinary problems in school. Protected her sister, friends.     Treated with medication by PCP in  or , dx'ed with depression in - by Dr. Zhong. Diagnosis later changed to bipolar disorder, but she doesn't believe this matches her. She's read about borderline personality & thinks that features of this diagnosis better match her understanding of herself. Was mostly off medication after Dr. Landon left private practice.     Went back to Dr. Landon in  when she became available again, has seen her since & has continued on her current regimen. Was seeing a therapist that works with her, Namita Lopez. They recommended a long-term treatment program in [place] at one point. Reports mood problems have led her to be passed up for promotions, advancement. Denies periods of mood elevation that have led to dysfunction.    Family Hx: father   Social Hx: as above. father left at age 6. Wasn't in her life after that, but sought him out at 41, but was disappointed by the relationship because he wanted money from her.   Has a younger sister & younger brother. Brother , drowned in the bathtub.   Lives between two households (between mother's and partner's). Mother is critical. "   Master's degree. Works for the [employer].   No arrests.     Review Of Systems:     GENERAL:  No weight gain or loss  SKIN:  No rashes or lacerations  HEAD:  No headaches  CHEST:  No shortness of breath, hyperventilation or cough  CARDIOVASCULAR:  No tachycardia or chest pain  ABDOMEN:  No nausea, vomiting, pain, constipation or diarrhea  URINARY:  No frequency, dysuria or sexual dysfunction  ENDOCRINE:  No polydipsia, polyuria  MUSCULOSKELETAL:  No pain or stiffness of the joints  NEUROLOGIC:  No weakness, sensory changes, seizures, confusion, memory loss, tremor or other abnormal movements    Current Evaluation:     Nutritional Screening: Considering the patient's height and weight, medications, medical history and preferences, should a referral be made to the dietitian? no    Constitutional  Vitals:  Most recent vital signs, dated less than 90 days prior to this appointment, were reviewed.       General:  unremarkable, age appropriate     Musculoskeletal  Muscle Strength/Tone:  no tremor, no tic   Gait & Station:  non-ataxic     Psychiatric  Appearance: casually dressed & groomed;   Behavior: calm,   Cooperation: cooperative with assessment  Speech: normal rate, volume, tone  Thought Process: linear, goal-directed  Thought Content: No suicidal or homicidal ideation; no delusions  Affect: anxious  Mood: anxious  Perceptions: No auditory or visual hallucinations  Level of Consciousness: alert throughout interview  Insight: fair  Cognition: Oriented to person, place, time, & situation  Memory: no apparent deficits to general clinical interview; not formally assessed  Attention/Concentration: no apparent deficits to general clinical interview; not formally assessed  Fund of Knowledge: average by vocabulary/education    Laboratory Data  No visits with results within 1 Month(s) from this visit.   Latest known visit with results is:   Lab Visit on 08/05/2024   Component Date Value Ref Range Status    TSH 08/05/2024 1.710   0.400 - 4.000 uIU/mL Final     Medications  Outpatient Encounter Medications as of 12/2/2024   Medication Sig Dispense Refill    busPIRone (BUSPAR) 30 MG Tab TAKE 1 TABLET(30 MG) BY MOUTH TWICE DAILY 180 tablet 1    fluvoxaMINE (LUVOX) 50 MG Tab tablet Take 1 tablet (50 mg total) by mouth every evening. 90 tablet 0    ibuprofen (ADVIL,MOTRIN) 800 MG tablet Take 1 tablet (800 mg total) by mouth 3 (three) times daily as needed for Pain. 30 tablet 0    lamoTRIgine (LAMICTAL) 150 MG Tab Take 1 tablet (150 mg total) by mouth once daily. 90 tablet 1    levothyroxine (SYNTHROID) 25 MCG tablet Take 1 tablet (25 mcg total) by mouth before breakfast. 90 tablet 3    mirtazapine (REMERON) 15 MG tablet TAKE 1 TABLET(15 MG) BY MOUTH EVERY NIGHT AS NEEDED FOR SLEEP 90 tablet 1    pantoprazole (PROTONIX) 40 MG tablet TAKE 1 TABLET BY MOUTH EVERY MORNING 30 MINUTES BEFORE EATING MEAL 90 tablet 2    [DISCONTINUED] fluvoxaMINE (LUVOX) 50 MG Tab tablet Take 1 tablet (50 mg total) by mouth every evening. 90 tablet 1     No facility-administered encounter medications on file as of 12/2/2024.     Assessment - Diagnosis - Goals:     Impression: Patient is a 63 y/o F with chronic bipolar disorder, with active major depressive episode despite adherence with medication; has hx of remote substance use disorders. Some progress on insomnia when has regular medication. Role stress ongoing w/r/t care for mother. Recent cancer treatment, now finished, doing ok.     Dx: bipolar I disorder, mre depressed; anxiety    Treatment Goals:  Specify outcomes written in observable, behavioral terms: prevent recurrences.    Treatment Plan/Recommendations:   Lamotrigine, fluvoxamine, buspirone for anxiety. mirtazapine qhs prn sleep.  Discussed risks, benefits, and alternatives to treatment plan documented above with patient. I answered all patient questions related to this plan and patient expressed understanding and agreement.     Return to Clinic: 4  months    TIANNA. Gato Soriano MD  Psychiatry, [facility]

## 2024-12-10 NOTE — PROGRESS NOTES
`    Individual Psychotherapy (PhD/LCSW)    5/3/21    Site:  [facility]         Therapeutic Intervention: Met with patient in clinic.  Met with patient. Outpatient - Insight oriented psychotherapy 45 min - CPT code 38014 and Outpatient - Supportive psychotherapy 45 min - CPT Code 90644    Chief complaint/reason for encounter: depression, mood swings, anxiety, grief and loss, and interpersonal     Interval history and content of current session:  Late entry for 12/2/24. 64 year old female patient returned to clinic for psychotherapy, after a bit of absence as she focused on cancer treatment. Patient reporting very positive health developments and feeling very encouraged and relieved. [place] of her cancer in July. September 23rd she underwent a full hysterectomy. Then learned her cancer state and grade were both notably low, a very good thing. She was then recommended for radiation, 5 treatment episodes, and she reported having just completed her last one the week of Thanksgiving. She said she is now anticipating the [employer] family gathering, with some tension, as her partner's estranged and drama-creating daughter is expected to be there. The patient has clashed with her before, generally in the context of defending her partner or another family member from disrespectful treatment by the daughter. Patient denied si/hi, psychosis, cognitive deficits, or substance abuse in the interim. Supportive therapy provided.  Scheduled for follow up 1/6/25.       Treatment plan:  Target symptoms: depression, anxiety , substance abuse, mood swings, grief, relationship tension  Why chosen therapy is appropriate versus another modality: relevant to diagnosis, patient responds to this modality  Outcome monitoring methods: self-report, observation  Therapeutic intervention type: insight oriented psychotherapy, supportive psychotherapy    Risk parameters:  Patient reports no suicidal ideation  Patient reports no homicidal  ideation  Patient reports no self-injurious behavior  Patient reports no violent behavior    Verbal deficits: None    Patient's response to intervention:  The patient's response to intervention is accepting.    Progress toward goals and other mental status changes:  The patient's progress toward goals is fair    Diagnosis:     ICD-10-CM ICD-9-CM   1. Bipolar mixed affective disorder, moderate  F31.62 296.62   2. Anxiety  F41.9 300.00   3. Insomnia, unspecified type  G47.00 780.52     History of Alcohol use disorder     Plan:  individual psychotherapy and medication management by physician    Return to clinic: as scheduled    Length of Service (minutes): 50

## 2025-04-07 ENCOUNTER — OFFICE VISIT (OUTPATIENT)
Dept: PSYCHIATRY | Facility: CLINIC | Age: 65
End: 2025-04-07
Payer: COMMERCIAL

## 2025-04-07 DIAGNOSIS — Z63.9 FAMILY CIRCUMSTANCE: ICD-10-CM

## 2025-04-07 DIAGNOSIS — G47.00 INSOMNIA, UNSPECIFIED TYPE: ICD-10-CM

## 2025-04-07 DIAGNOSIS — F31.30 BIPOLAR DISORDER, MOST RECENT EPISODE DEPRESSED: Primary | ICD-10-CM

## 2025-04-07 DIAGNOSIS — F41.9 ANXIETY: ICD-10-CM

## 2025-04-07 PROCEDURE — 90834 PSYTX W PT 45 MINUTES: CPT | Mod: S$GLB,,, | Performed by: SOCIAL WORKER

## 2025-04-07 PROCEDURE — 99999 PR PBB SHADOW E&M-EST. PATIENT-LVL I: CPT | Mod: PBBFAC,,, | Performed by: SOCIAL WORKER

## 2025-04-07 RX ORDER — FLUVOXAMINE MALEATE 50 MG/1
50 TABLET, FILM COATED ORAL NIGHTLY
Qty: 90 TABLET | Refills: 1 | Status: SHIPPED | OUTPATIENT
Start: 2025-04-07

## 2025-04-07 RX ORDER — LAMOTRIGINE 150 MG/1
150 TABLET ORAL DAILY
Qty: 90 TABLET | Refills: 1 | Status: SHIPPED | OUTPATIENT
Start: 2025-04-07 | End: 2026-04-07

## 2025-04-07 RX ORDER — MIRTAZAPINE 15 MG/1
TABLET, FILM COATED ORAL
Qty: 90 TABLET | Refills: 1 | Status: SHIPPED | OUTPATIENT
Start: 2025-04-07

## 2025-04-07 RX ORDER — BUSPIRONE HYDROCHLORIDE 30 MG/1
TABLET ORAL
Qty: 180 TABLET | Refills: 1 | Status: SHIPPED | OUTPATIENT
Start: 2025-04-07

## 2025-04-07 SDOH — SOCIAL DETERMINANTS OF HEALTH (SDOH): PROBLEM RELATED TO PRIMARY SUPPORT GROUP, UNSPECIFIED: Z63.9

## 2025-04-07 NOTE — PROGRESS NOTES
"Outpatient Psychiatry Follow-up Visit (MD/NP)    2025    June Stevens, a 65 y.o. female, presenting for follow-up visit. Met with patient.    Reason for Encounter: Chief complaint/reason for encounter: addictive disorder, depression, mood swings, anxiety, behavior and interpersonal.     Interval Hx: Patient seen and interviewed for follow-up, about six months since last visit. This was a VIDEO VISIT. She was at home. Stressed by burdens of care (elderly mother). Working 3 days/week. Has annual PCP appointment later this month. First post cancer treatment follow-up in May. No new health complaints. Doing all of her usual activities including work. No new medications.   Adherent with medication. Describes ongoing benefits. Denies side effects.     Background: Pt is a 60 y/o F who presents for establishment of psychiatric care, previously evaluated by Mr. Junito Costa. From his note: reporting chief complaint of "my partner thought since I am getting medication, I ought probably to have some talk therapy as well." Pt described a history of mood swings & recurrent depression going back to her teenage years, a recurrent struggle with anger & angry outbursts, & poor coping skills, relying too much on binge drinking (less than weekly, but always to a point of heavy intoxication) to blot out anger & anxiety. She described a fragile self-esteem, pattern of defensiveness, heightened irritability, & frequent depressive cycles. She believes she has abandonment issues from her parents having left for [place] abruptly when she was just 4 years old, leaving her & her younger sister with a grandmother. Mom returned alone after 1 year but never fully embodied a parental figure to the pt. Her grandmother, who represented stability,  when the pt was 27. She recalls mood issues going back to Jr High age, age 12 or 13. She said that was when social issues at school started to become more difficult as she & those around " "her started noticing she wasn't conforming to stereotypical feminine norms & came to be suspected of being a lesbian. She described an emotional struggle between her Episcopalian beliefs & growing awareness of her same-sex orientation. She said she tried to date boys. She reported a lot of peer rejection, being known as "a alcides lesbian," & she attempted suicide at age 17, stepping out in front of an oncoming car. By age 19 she had been introduced to the bhandari bar scene, which she experienced as a crucial affirmation that she isn't alone, but which met with her mother's disapproval; it took her mother years to become more accepting. She described her current living situation as a split between 2 households, 1 with her partner of 25 years, who is older & "very controlling," & the other with the pt's mother. She reports tension in her primary relationship that involves infidelity on her part back in '09, an affair stemming from a high school reunion. She said her partner now polices her, & she feels resentful but also doesn't want to leave her partner over it. The pt denied any suicidal or homicidal ideation, though endorsing remote history of a suicidal episode as a teenager. Denied any cognitive deficit, psychosis, mood swings, or recreational drug use. Endorsed past struggle with some alcohol abuse, a period of abstinence with AA participation, & now periodic binges. Endorses struggle with explosive anger. Identified therapeutic goals include reduction of depression & anxiety, improved self-esteem, & coping skills, including anger management, assertiveness communication skills.        Symptoms:   Mood: depressed mood, diminished interest and worthlessness/guilt  Anxiety: excessive anxiety/worry, restlessness/keyed up and irritability  Substance abuse: substance tolerance and take more than intended  Cognitive functioning: denied  Health behaviors: some alcohol binges     Psychiatric history: psychotropic management by " "PCP & prior suicide attempt(s); no chemical abuse treatment reported but did attend AA for years & had a sponsor; now relapsed with a binge pattern of use. Family history of psychiatric illness: suspects father bipolar (never diagnosed); sister with clinical anxiety. Social history: See history above; oldest of 3; younger brother drowned as an infant. Younger sister is her closest friend. Chaotic childhood; parents were very young; left pt & younger sister with grandmother when she was 4; father never returned; mother came back after 1 year. Grandmother, an aunt, & uncle were parental figures. Pt described growing social tension in school in middle school & high school, related to her budding identity as a masculine lesbian. Reported running away from school & home several times in teens. Attempted suicide at 17. Discovered bhandari bars at 19 found the start of a sort of community. 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 Current committed relationship of the past 25 years; described partner as controling, domineering, but "she takes care of me." Endorsed tension of pt's past infidelity a decade ago. Previous longest relationship was for 5 years. No biological children of her own. Never in a relationship with a man. Loves animals; finds them easier to relate to than people. Currently lives between 2 houses, one with her partner, the other with her mother.       Substance use:   Alcohol: binge pattern of abuse; denied any known health damage from alcohol   Drugs: none   Tobacco: none   Caffeine: average of 2 and a half cups of coffee daily    Pt confirms the above history. Reports that she's been anxious her "entire life"; overriding distress & impairment. Past dx'es include depression, bipolar disorder (some impulsive shopping). Meds through Dr. Bee Landon for many years, but stopped seeing Dr. Zhong's in summer 2019. Found treatment unaffordable. Off medicine for 3-4 months. " ""had some left but thought it wasn't working, it makes me gain weight, & I'm sluggish". Thinks that these things improved off the medication.     "got an ultimatum" to get back onto medication by her partner who patient says has observed that patient is less irritable & agitated on medication (but she doesn't see this herself).     Endorses "yes" to most items on MDQ    Psych Hx: "anger issues" since a kid. "fought a lot", "usually defending other people", "I couldn't verbally say what I felt, so I thought", though rarely disciplinary problems in school. Protected her sister, friends.     Treated with medication by PCP in  or , dx'ed with depression in - by Dr. Zhong. Diagnosis later changed to bipolar disorder, but she doesn't believe this matches her. She's read about borderline personality & thinks that features of this diagnosis better match her understanding of herself. Was mostly off medication after Dr. Landon left private practice.     Went back to Dr. Landon in  when she became available again, has seen her since & has continued on her current regimen. Was seeing a therapist that works with her, Namita Lopez. They recommended a long-term treatment program in [place] at one point. Reports mood problems have led her to be passed up for promotions, advancement. Denies periods of mood elevation that have led to dysfunction.    Family Hx: father   Social Hx: as above. father left at age 6. Wasn't in her life after that, but sought him out at 41, but was disappointed by the relationship because he wanted money from her.   Has a younger sister & younger brother. Brother , drowned in the bathtub.   Lives between two households (between mother's and partner's). Mother is critical.   Master's degree. Works for the state.   No arrests.     Review Of Systems:     GENERAL:  No weight gain or loss  SKIN:  No rashes or lacerations  HEAD:  No headaches  CHEST:  No shortness of breath, " hyperventilation or cough  CARDIOVASCULAR:  No tachycardia or chest pain  ABDOMEN:  No nausea, vomiting, pain, constipation or diarrhea  URINARY:  No frequency, dysuria or sexual dysfunction  ENDOCRINE:  No polydipsia, polyuria  MUSCULOSKELETAL:  No pain or stiffness of the joints  NEUROLOGIC:  No weakness, sensory changes, seizures, confusion, memory loss, tremor or other abnormal movements    Current Evaluation:     Nutritional Screening: Considering the patient's height and weight, medications, medical history and preferences, should a referral be made to the dietitian? no    Constitutional  Vitals:  Most recent vital signs, dated less than 90 days prior to this appointment, were reviewed.       General:  unremarkable, age appropriate     Musculoskeletal  Muscle Strength/Tone:  no tremor, no tic   Gait & Station:  non-ataxic     Psychiatric  Appearance: casually dressed & groomed;   Behavior: calm,   Cooperation: cooperative with assessment  Speech: normal rate, volume, tone  Thought Process: linear, goal-directed  Thought Content: No suicidal or homicidal ideation; no delusions  Affect: anxious  Mood: anxious  Perceptions: No auditory or visual hallucinations  Level of Consciousness: alert throughout interview  Insight: fair  Cognition: Oriented to person, place, time, & situation  Memory: no apparent deficits to general clinical interview; not formally assessed  Attention/Concentration: no apparent deficits to general clinical interview; not formally assessed  Fund of Knowledge: average by vocabulary/education    Laboratory Data  No visits with results within 1 Month(s) from this visit.   Latest known visit with results is:   Lab Visit on 08/05/2024   Component Date Value Ref Range Status    TSH 08/05/2024 1.710  0.400 - 4.000 uIU/mL Final     Medications  Outpatient Encounter Medications as of 4/7/2025   Medication Sig Dispense Refill    busPIRone (BUSPAR) 30 MG Tab TAKE 1 TABLET(30 MG) BY MOUTH TWICE DAILY  180 tablet 0    fluvoxaMINE (LUVOX) 50 MG Tab tablet Take 1 tablet (50 mg total) by mouth every evening. 90 tablet 1    ibuprofen (ADVIL,MOTRIN) 800 MG tablet Take 1 tablet (800 mg total) by mouth 3 (three) times daily as needed for Pain. 30 tablet 0    lamoTRIgine (LAMICTAL) 150 MG Tab Take 1 tablet (150 mg total) by mouth once daily. 90 tablet 0    levothyroxine (SYNTHROID) 25 MCG tablet Take 1 tablet (25 mcg total) by mouth before breakfast. 90 tablet 3    mirtazapine (REMERON) 15 MG tablet TAKE 1 TABLET(15 MG) BY MOUTH EVERY NIGHT AS NEEDED FOR SLEEP 90 tablet 0    pantoprazole (PROTONIX) 40 MG tablet TAKE 1 TABLET BY MOUTH EVERY MORNING 30 MINUTES BEFORE EATING MEAL 90 tablet 2     No facility-administered encounter medications on file as of 4/7/2025.     Assessment - Diagnosis - Goals:     Impression: Patient is a 66 y/o F with chronic bipolar disorder, with active major depressive episode despite adherence with medication; has hx of remote substance use disorders. Some progress on insomnia when has regular medication. Role stress ongoing w/r/t care for mother. Feeling a little better over time following recent cancer treatment, now finished, doing ok.     Dx: bipolar I disorder, mre depressed; anxiety    Treatment Goals:  Specify outcomes written in observable, behavioral terms: prevent recurrences.    Treatment Plan/Recommendations:   Lamotrigine, fluvoxamine, buspirone for anxiety. mirtazapine qhs prn sleep.  Discussed risks, benefits, and alternatives to treatment plan documented above with patient. I answered all patient questions related to this plan and patient expressed understanding and agreement.     Return to Clinic: 4 months    CLINT Soriano MD  Psychiatry, [facility]

## 2025-04-13 NOTE — PROGRESS NOTES
`    Individual Psychotherapy (PhD/LCSW)    5/3/21    Site:  [facility]         Therapeutic Intervention: Met with patient in clinic.  Met with patient. Outpatient - Insight oriented psychotherapy 45 min - CPT code 90985 and Outpatient - Supportive psychotherapy 45 min - CPT Code 93989    Chief complaint/reason for encounter: depression, mood swings, anxiety, grief and loss, and interpersonal     Interval history and content of current session:  Late entry for 4/7/25. 65 year old female patient returned to clinic for psychotherapy. Last seen 12/2/24. Patient has focused more of her time on cancer treatment in the interim, but reporting it has gone very well so far. Returning to clinic with report that she is feeling stressed about changes to her work schedule and distress at chaos in the economy and seeking her stock values dropping sharply. Reporting her elderly mother's mental function is declining, including memory and reason. Discussed self-care activities and some alternative perspectives to help with maintaining some hope for the long-term future, as much currently feels uncertain.  Patient denied si/hi, psychosis, cognitive deficits, or substance abuse in the interim. Supportive therapy provided.  Scheduled for follow up 5/15/25.       Treatment plan:  Target symptoms: depression, anxiety , substance abuse, mood swings, grief, relationship tension  Why chosen therapy is appropriate versus another modality: relevant to diagnosis, patient responds to this modality  Outcome monitoring methods: self-report, observation  Therapeutic intervention type: insight oriented psychotherapy, supportive psychotherapy    Risk parameters:  Patient reports no suicidal ideation  Patient reports no homicidal ideation  Patient reports no self-injurious behavior  Patient reports no violent behavior    Verbal deficits: None    Patient's response to intervention:  The patient's response to intervention is accepting.    Progress  toward goals and other mental status changes:  The patient's progress toward goals is fair    Diagnosis:     ICD-10-CM ICD-9-CM   1. Bipolar disorder, most recent episode depressed  F31.30 296.50   2. Anxiety  F41.9 300.00   3. Insomnia, unspecified type  G47.00 780.52   4. Family circumstance  Z63.9 V61.9     History of Alcohol use disorder     Plan:  individual psychotherapy and medication management by physician    Return to clinic: as scheduled    Length of Service (minutes): 52

## 2025-04-22 ENCOUNTER — PATIENT MESSAGE (OUTPATIENT)
Dept: FAMILY MEDICINE | Facility: CLINIC | Age: 65
End: 2025-04-22
Payer: COMMERCIAL

## 2025-04-22 DIAGNOSIS — Z00.00 PREVENTATIVE HEALTH CARE: Primary | ICD-10-CM

## 2025-04-25 ENCOUNTER — OFFICE VISIT (OUTPATIENT)
Dept: PSYCHIATRY | Facility: CLINIC | Age: 65
End: 2025-04-25
Payer: COMMERCIAL

## 2025-04-25 ENCOUNTER — LAB VISIT (OUTPATIENT)
Dept: LAB | Facility: HOSPITAL | Age: 65
End: 2025-04-25
Attending: FAMILY MEDICINE
Payer: COMMERCIAL

## 2025-04-25 DIAGNOSIS — Z00.00 PREVENTATIVE HEALTH CARE: ICD-10-CM

## 2025-04-25 DIAGNOSIS — F31.62 BIPOLAR MIXED AFFECTIVE DISORDER, MODERATE: Primary | ICD-10-CM

## 2025-04-25 DIAGNOSIS — G47.00 INSOMNIA, UNSPECIFIED TYPE: ICD-10-CM

## 2025-04-25 DIAGNOSIS — Z63.9 FAMILY DYNAMICS PROBLEM: ICD-10-CM

## 2025-04-25 DIAGNOSIS — F41.9 ANXIETY: ICD-10-CM

## 2025-04-25 LAB
ABSOLUTE EOSINOPHIL (OHS): 0.25 K/UL
ABSOLUTE MONOCYTE (OHS): 0.59 K/UL (ref 0.3–1)
ABSOLUTE NEUTROPHIL COUNT (OHS): 3.37 K/UL (ref 1.8–7.7)
ALBUMIN SERPL BCP-MCNC: 3.6 G/DL (ref 3.5–5.2)
ALP SERPL-CCNC: 94 UNIT/L (ref 40–150)
ALT SERPL W/O P-5'-P-CCNC: 10 UNIT/L (ref 10–44)
ANION GAP (OHS): 7 MMOL/L (ref 8–16)
AST SERPL-CCNC: 19 UNIT/L (ref 11–45)
BASOPHILS # BLD AUTO: 0.07 K/UL
BASOPHILS NFR BLD AUTO: 1.2 %
BILIRUB SERPL-MCNC: 0.4 MG/DL (ref 0.1–1)
BUN SERPL-MCNC: 15 MG/DL (ref 8–23)
CALCIUM SERPL-MCNC: 9 MG/DL (ref 8.7–10.5)
CHLORIDE SERPL-SCNC: 111 MMOL/L (ref 95–110)
CHOLEST SERPL-MCNC: 158 MG/DL (ref 120–199)
CHOLEST/HDLC SERPL: 3.4 {RATIO} (ref 2–5)
CO2 SERPL-SCNC: 29 MMOL/L (ref 23–29)
CREAT SERPL-MCNC: 1 MG/DL (ref 0.5–1.4)
EAG (OHS): 108 MG/DL (ref 68–131)
ERYTHROCYTE [DISTWIDTH] IN BLOOD BY AUTOMATED COUNT: 14.2 % (ref 11.5–14.5)
GFR SERPLBLD CREATININE-BSD FMLA CKD-EPI: >60 ML/MIN/1.73/M2
GLUCOSE SERPL-MCNC: 91 MG/DL (ref 70–110)
HBA1C MFR BLD: 5.4 % (ref 4–5.6)
HCT VFR BLD AUTO: 36.3 % (ref 37–48.5)
HDLC SERPL-MCNC: 47 MG/DL (ref 40–75)
HDLC SERPL: 29.7 % (ref 20–50)
HGB BLD-MCNC: 11 GM/DL (ref 12–16)
IMM GRANULOCYTES # BLD AUTO: 0.01 K/UL (ref 0–0.04)
IMM GRANULOCYTES NFR BLD AUTO: 0.2 % (ref 0–0.5)
LDLC SERPL CALC-MCNC: 92.2 MG/DL (ref 63–159)
LYMPHOCYTES # BLD AUTO: 1.36 K/UL (ref 1–4.8)
MCH RBC QN AUTO: 28.1 PG (ref 27–31)
MCHC RBC AUTO-ENTMCNC: 30.3 G/DL (ref 32–36)
MCV RBC AUTO: 93 FL (ref 82–98)
NONHDLC SERPL-MCNC: 111 MG/DL
NUCLEATED RBC (/100WBC) (OHS): 0 /100 WBC
PLATELET # BLD AUTO: 334 K/UL (ref 150–450)
PMV BLD AUTO: 11 FL (ref 9.2–12.9)
POTASSIUM SERPL-SCNC: 3.8 MMOL/L (ref 3.5–5.1)
PROT SERPL-MCNC: 7.1 GM/DL (ref 6–8.4)
RBC # BLD AUTO: 3.91 M/UL (ref 4–5.4)
RELATIVE EOSINOPHIL (OHS): 4.4 %
RELATIVE LYMPHOCYTE (OHS): 24.1 % (ref 18–48)
RELATIVE MONOCYTE (OHS): 10.4 % (ref 4–15)
RELATIVE NEUTROPHIL (OHS): 59.7 % (ref 38–73)
SODIUM SERPL-SCNC: 147 MMOL/L (ref 136–145)
TRIGL SERPL-MCNC: 94 MG/DL (ref 30–150)
TSH SERPL-ACNC: 1.57 UIU/ML (ref 0.4–4)
WBC # BLD AUTO: 5.65 K/UL (ref 3.9–12.7)

## 2025-04-25 PROCEDURE — 83036 HEMOGLOBIN GLYCOSYLATED A1C: CPT

## 2025-04-25 PROCEDURE — 84443 ASSAY THYROID STIM HORMONE: CPT

## 2025-04-25 PROCEDURE — 80053 COMPREHEN METABOLIC PANEL: CPT

## 2025-04-25 PROCEDURE — 85025 COMPLETE CBC W/AUTO DIFF WBC: CPT

## 2025-04-25 PROCEDURE — 3044F HG A1C LEVEL LT 7.0%: CPT | Mod: CPTII,S$GLB,, | Performed by: SOCIAL WORKER

## 2025-04-25 PROCEDURE — 99999 PR PBB SHADOW E&M-EST. PATIENT-LVL I: CPT | Mod: PBBFAC,,, | Performed by: SOCIAL WORKER

## 2025-04-25 PROCEDURE — 90834 PSYTX W PT 45 MINUTES: CPT | Mod: S$GLB,,, | Performed by: SOCIAL WORKER

## 2025-04-25 PROCEDURE — 36415 COLL VENOUS BLD VENIPUNCTURE: CPT | Mod: PO

## 2025-04-25 PROCEDURE — 80061 LIPID PANEL: CPT

## 2025-04-25 SDOH — SOCIAL DETERMINANTS OF HEALTH (SDOH): PROBLEM RELATED TO PRIMARY SUPPORT GROUP, UNSPECIFIED: Z63.9

## 2025-04-27 ENCOUNTER — RESULTS FOLLOW-UP (OUTPATIENT)
Dept: FAMILY MEDICINE | Facility: CLINIC | Age: 65
End: 2025-04-27

## 2025-05-04 NOTE — PROGRESS NOTES
`    Individual Psychotherapy (PhD/LCSW)    5/3/21    Site:  [facility]         Therapeutic Intervention: Met with patient in clinic.  Met with patient. Outpatient - Insight oriented psychotherapy 45 min - CPT code 85349 and Outpatient - Supportive psychotherapy 45 min - CPT Code 18560    Chief complaint/reason for encounter: depression, mood swings, anxiety, grief and loss, and interpersonal     Interval history and content of current session:  Late entry for 4/25/25. 65 year old female patient returned to clinic for psychotherapy. Last seen 4/7/25. Patient set this as an emergent appointment. Feeling anxious and distressed by family relations, particularly with her long-time spouse. Discussed a few related things. Reported an alcohol relapse of 3 days in the context of a serious argument with her wife. Stated since those 3 days, she has steered clear of alcohol again. Stated the alcohol exacerbates her tendencies to relate poorly to others and to be verbally and sometimes physically combative. Stated she is struggling with the issue of her wife's ongoing relationship with the wife's grown daughter, her wife having repeatedly made overtures to the daughter to try to repair their strained mother/daughter relationship. Patient said this daughter had burned bridges with various family members, but most notably with her own daughter, the patient's step-granddaughter, who remains close to the patient. The patient has indicated she will never forgive this step-daughter, but her spouse occasionally tries to make overtures to try to get the patient to reconcile. Patient expressed unhappiness and guilt feelings and harshly rebuffing her spouse's efforts, while she maintains the right to set and keep her own boundaries, she feels bad for appearing to try to keep her wife from having anything to do with the wife's own daughter. Patient reported she has restarted AA participation and step-work and has obtained a sponsor. Said  they med the day before this session. She said also she and wife are talking; wife seems familiar and not shocked by patient's reactions, in part due to her long history with bipolar I disorder. Patient also noting she felt the need to talk about all of this sooner, rather than later. Working on sorting through it. Patient denied si/hi, psychosis, cognitive deficits, or substance abuse in the interim. Supportive therapy provided.  Scheduled for follow up 5/15/25.       Treatment plan:  Target symptoms: depression, anxiety , substance abuse, mood swings, grief, relationship tension  Why chosen therapy is appropriate versus another modality: relevant to diagnosis, patient responds to this modality  Outcome monitoring methods: self-report, observation  Therapeutic intervention type: insight oriented psychotherapy, supportive psychotherapy    Risk parameters:  Patient reports no suicidal ideation  Patient reports no homicidal ideation  Patient reports no self-injurious behavior  Patient reports no violent behavior    Verbal deficits: None    Patient's response to intervention:  The patient's response to intervention is accepting.    Progress toward goals and other mental status changes:  The patient's progress toward goals is mixed    Diagnosis:     ICD-10-CM ICD-9-CM   1. Bipolar mixed affective disorder, moderate  F31.62 296.62   2. Anxiety  F41.9 300.00   3. Insomnia, unspecified type  G47.00 780.52   4. Family dynamics problem  Z63.9 V61.9     History of Alcohol use disorder     Plan:  individual psychotherapy and medication management by physician    Return to clinic: as scheduled    Length of Service (minutes): 50

## 2025-05-05 ENCOUNTER — TELEPHONE (OUTPATIENT)
Dept: FAMILY MEDICINE | Facility: CLINIC | Age: 65
End: 2025-05-05
Payer: COMMERCIAL

## 2025-05-05 NOTE — TELEPHONE ENCOUNTER
----- Message from angelMD sent at 5/5/2025  3:54 PM CDT -----  Contact: self  ..Type:  Patient Returning CallWho Called:.June Rao Left Message for Patient:Does the patient know what this is regarding?:no Would the patient rather a call back or a response via [facility]? Call back Best Call Back Number:.[phone] Information: pt is returning a missed call

## 2025-05-06 ENCOUNTER — OFFICE VISIT (OUTPATIENT)
Dept: FAMILY MEDICINE | Facility: CLINIC | Age: 65
End: 2025-05-06
Payer: COMMERCIAL

## 2025-05-06 VITALS
TEMPERATURE: 96 F | SYSTOLIC BLOOD PRESSURE: 139 MMHG | DIASTOLIC BLOOD PRESSURE: 76 MMHG | WEIGHT: 200.06 LBS | BODY MASS INDEX: 31.4 KG/M2 | HEIGHT: 67 IN | OXYGEN SATURATION: 99 % | HEART RATE: 75 BPM

## 2025-05-06 DIAGNOSIS — C54.1 ENDOMETRIAL CANCER: Chronic | ICD-10-CM

## 2025-05-06 DIAGNOSIS — F31.30 BIPOLAR AFFECTIVE DISORDER, CURRENT EPISODE DEPRESSED, CURRENT EPISODE SEVERITY UNSPECIFIED: ICD-10-CM

## 2025-05-06 DIAGNOSIS — Z12.31 ENCOUNTER FOR SCREENING MAMMOGRAM FOR MALIGNANT NEOPLASM OF BREAST: ICD-10-CM

## 2025-05-06 DIAGNOSIS — E78.2 MIXED HYPERLIPIDEMIA: Chronic | ICD-10-CM

## 2025-05-06 DIAGNOSIS — R32 URINARY INCONTINENCE, UNSPECIFIED TYPE: ICD-10-CM

## 2025-05-06 DIAGNOSIS — Z00.00 PREVENTATIVE HEALTH CARE: Primary | ICD-10-CM

## 2025-05-06 DIAGNOSIS — F41.1 GENERALIZED ANXIETY DISORDER: ICD-10-CM

## 2025-05-06 PROBLEM — E66.3 OVERWEIGHT (BMI 25.0-29.9): Status: RESOLVED | Noted: 2023-02-27 | Resolved: 2025-05-06

## 2025-05-06 PROBLEM — Z01.810 PREOP CARDIOVASCULAR EXAM: Status: RESOLVED | Noted: 2024-07-08 | Resolved: 2025-05-06

## 2025-05-06 PROBLEM — N94.89 ENDOMETRIAL MASS: Status: RESOLVED | Noted: 2023-11-27 | Resolved: 2025-05-06

## 2025-05-06 PROBLEM — N83.202 CYST OF LEFT OVARY: Status: RESOLVED | Noted: 2023-11-27 | Resolved: 2025-05-06

## 2025-05-06 PROCEDURE — 3078F DIAST BP <80 MM HG: CPT | Mod: CPTII,S$GLB,, | Performed by: FAMILY MEDICINE

## 2025-05-06 PROCEDURE — 99999 PR PBB SHADOW E&M-EST. PATIENT-LVL IV: CPT | Mod: PBBFAC,,, | Performed by: FAMILY MEDICINE

## 2025-05-06 PROCEDURE — 3044F HG A1C LEVEL LT 7.0%: CPT | Mod: CPTII,S$GLB,, | Performed by: FAMILY MEDICINE

## 2025-05-06 PROCEDURE — 3008F BODY MASS INDEX DOCD: CPT | Mod: CPTII,S$GLB,, | Performed by: FAMILY MEDICINE

## 2025-05-06 PROCEDURE — 99396 PREV VISIT EST AGE 40-64: CPT | Mod: S$GLB,,, | Performed by: FAMILY MEDICINE

## 2025-05-06 PROCEDURE — 3075F SYST BP GE 130 - 139MM HG: CPT | Mod: CPTII,S$GLB,, | Performed by: FAMILY MEDICINE

## 2025-05-06 PROCEDURE — 1159F MED LIST DOCD IN RCRD: CPT | Mod: CPTII,S$GLB,, | Performed by: FAMILY MEDICINE

## 2025-05-06 NOTE — PROGRESS NOTES
CHIEF COMPLAINT:  This is a 65-year-old female here for preventive health exam.     SUBJECTIVE: The patient is doing well without complaints except for urinary incontinence since hysterectomy. She had a vaginal hysterectomy with BSO in September 2024 for endometrial cancer followed by radiation.  Patient previously had incontinence surgery in 2008.  Patient takes Luvox, Lamictal, and Buspar for bipolar disorder. She was prescribed temazepam 15 mg for sleeplessness. She reports that Seroquel helped with insomnia more than temazepam. She often takes several temazepam sequentially so that she will sleep for 3 days. She has a history of mixed hyperlipidemia. She has GERD and takes pantoprazole as needed. She is obese with a BMI is 31.34.     Eye exam December 2023. Mammogram April 2024.  Pap smear October 2023. Colonoscopy April 2022, due again in April 2032.  Tdap October 2015.  Flu vaccine October 2023. COVID 19 vaccine February, March, November 2021, April 2022, June 2023.     ROS:  GENERAL: Patient denies fever, chills, night sweats. Patient denies weight loss.  Patient denies anorexia, fatigue, weakness or swollen glands.  SKIN: Patient denies rash or hair loss.  HEENT: Patient denies sore throat, ear pain, hearing loss, nasal congestion, or runny nose. Patient denies visual disturbance, eye irritation or discharge.  LUNGS: Patient denies wheeze or hemoptysis.  CARDIOVASCULAR: Patient denies chest pain, shortness of breath, palpitations, syncope or lower extremity edema.  GI: Patient denies abdominal pain, nausea, vomiting, diarrhea, constipation, blood in stool or melena.  GENITOURINARY: Patient denies pelvic pain, vaginal discharge, itch or odor. Patient denies irregular vaginal bleeding. Patient denies dysuria, frequency, hematuria, nocturia, urgency or incontinence.  BREASTS: Patient denies breast pain, mass or nipple discharge.  MUSCULOSKELETAL: Patient denies joint pain, swelling, redness or  warmth.  NEUROLOGIC: Patient denies headache, vertigo, paresthesias, weakness in limb, dysarthria, dysphagia or abnormality of gait.  PSYCHIATRIC: Patient denies anxiety, depression, or memory loss.     OBJECTIVE:   GENERAL: Well-developed well-nourished obese white female alert and oriented x3, in no acute distress. Memory, judgment and cognition without deficit.   SKIN: Clear without rash. Normal color and tone.   HEENT: Eyes: Clear conjunctivae. No scleral icterus. Pupils equal reactive to light and accommodation. Ears: Clear canals. Clear TMs. Nose:  Crusty scabbed lesion left nares.  Without congestion. Pharynx: Without injection or exudates.  NECK: Supple, normal range of motion. No masses, lymphadenopathy or enlarged thyroid. No JVD. Carotids 2+ and equal. No bruits.  LUNGS: Clear to auscultation. Normal respiratory effort.  CARDIOVASCULAR: Regular rhythm, normal S1, S2 without murmur, gallop or rub.  BACK: No CVA or spinal tenderness.  BREASTS: No masses or nipple discharge. Tenderness to palpation right greater than left breast.  ABDOMEN: Normal appearance. Active bowel sounds. Soft, nontender without mass or organomegaly. No rebound or guarding.  EXTREMITIES: Without cyanosis, clubbing or edema. Distal pulses 2+ and equal. Normal range of motion in all extremities. No joint effusion, erythema or warmth.  NEUROLOGIC: Cranial nerves II through XII without deficit. Motor strength equal bilaterally. Sensation normal to touch. Deep tendon reflexes 2+ and equal. Gait without abnormality. No tremor. Negative cerebellar signs.  PELVIC: Deferred to OBGYN.    ASSESSMENT:  1. Preventative health care    2. Mixed hyperlipidemia    3. Bipolar affective disorder, current episode depressed, current episode severity unspecified    4. Generalized anxiety disorder    5. Endometrial cancer    6. Urinary incontinence, unspecified type    7. Encounter for screening mammogram for malignant neoplasm of breast      PLAN:  1.  Weight reduction. Exercise regularly.  2. Age-appropriate counseling.  3. Recent lab reviewed and acceptable except for mild anemia.  Take multivitamin with iron or iron supplement daily.  Recheck CBC in 8-12 weeks.  4. Mammogram.  5. Urology consult.    6. Follow-up annually.    This note is generated with speech recognition software and is subject to transcription error and sound alike phrases that may be missed by proofreading.

## 2025-05-15 ENCOUNTER — OFFICE VISIT (OUTPATIENT)
Dept: PSYCHIATRY | Facility: CLINIC | Age: 65
End: 2025-05-15
Payer: COMMERCIAL

## 2025-05-15 DIAGNOSIS — F31.62 BIPOLAR MIXED AFFECTIVE DISORDER, MODERATE: Primary | ICD-10-CM

## 2025-05-15 DIAGNOSIS — G47.00 INSOMNIA, UNSPECIFIED TYPE: ICD-10-CM

## 2025-05-15 DIAGNOSIS — Z63.9 FAMILY DYNAMICS PROBLEM: ICD-10-CM

## 2025-05-15 DIAGNOSIS — F41.9 ANXIETY: ICD-10-CM

## 2025-05-15 PROCEDURE — 3044F HG A1C LEVEL LT 7.0%: CPT | Mod: CPTII,S$GLB,, | Performed by: SOCIAL WORKER

## 2025-05-15 PROCEDURE — 99999 PR PBB SHADOW E&M-EST. PATIENT-LVL I: CPT | Mod: PBBFAC,,, | Performed by: SOCIAL WORKER

## 2025-05-15 PROCEDURE — 90834 PSYTX W PT 45 MINUTES: CPT | Mod: S$GLB,,, | Performed by: SOCIAL WORKER

## 2025-05-15 SDOH — SOCIAL DETERMINANTS OF HEALTH (SDOH): PROBLEM RELATED TO PRIMARY SUPPORT GROUP, UNSPECIFIED: Z63.9

## 2025-05-19 ENCOUNTER — OFFICE VISIT (OUTPATIENT)
Dept: FAMILY MEDICINE | Facility: CLINIC | Age: 65
End: 2025-05-19
Payer: COMMERCIAL

## 2025-05-19 VITALS
TEMPERATURE: 97 F | HEART RATE: 75 BPM | HEIGHT: 67 IN | OXYGEN SATURATION: 99 % | BODY MASS INDEX: 31.16 KG/M2 | WEIGHT: 198.5 LBS | DIASTOLIC BLOOD PRESSURE: 80 MMHG | SYSTOLIC BLOOD PRESSURE: 126 MMHG

## 2025-05-19 DIAGNOSIS — F41.1 GENERALIZED ANXIETY DISORDER: ICD-10-CM

## 2025-05-19 DIAGNOSIS — E78.2 MIXED HYPERLIPIDEMIA: Chronic | ICD-10-CM

## 2025-05-19 DIAGNOSIS — M25.471 RIGHT ANKLE SWELLING: Primary | ICD-10-CM

## 2025-05-19 DIAGNOSIS — C54.1 ENDOMETRIAL CANCER: Chronic | ICD-10-CM

## 2025-05-19 DIAGNOSIS — M79.89 SWELLING OF RIGHT FOOT: ICD-10-CM

## 2025-05-19 PROCEDURE — 3074F SYST BP LT 130 MM HG: CPT | Mod: CPTII,S$GLB,, | Performed by: FAMILY MEDICINE

## 2025-05-19 PROCEDURE — 99213 OFFICE O/P EST LOW 20 MIN: CPT | Mod: S$GLB,,, | Performed by: FAMILY MEDICINE

## 2025-05-19 PROCEDURE — 3079F DIAST BP 80-89 MM HG: CPT | Mod: CPTII,S$GLB,, | Performed by: FAMILY MEDICINE

## 2025-05-19 PROCEDURE — G2211 COMPLEX E/M VISIT ADD ON: HCPCS | Mod: S$GLB,,, | Performed by: FAMILY MEDICINE

## 2025-05-19 PROCEDURE — 3044F HG A1C LEVEL LT 7.0%: CPT | Mod: CPTII,S$GLB,, | Performed by: FAMILY MEDICINE

## 2025-05-19 PROCEDURE — 99999 PR PBB SHADOW E&M-EST. PATIENT-LVL III: CPT | Mod: PBBFAC,,, | Performed by: FAMILY MEDICINE

## 2025-05-19 PROCEDURE — 3008F BODY MASS INDEX DOCD: CPT | Mod: CPTII,S$GLB,, | Performed by: FAMILY MEDICINE

## 2025-05-19 NOTE — PROGRESS NOTES
CHIEF COMPLAINT:  This is a 65-year-old female complaining of swelling right lower extremity.     SUBJECTIVE: The patient noted swelling of dorsal foot and ankle 2 days ago.  Yesterday she had tightness in right knee which has resolved.  She continues to have swelling in dorsal foot and ankle with minimal pain in lateral ankle.  She denies any history of injury but has been exercising more in order to manage weight.  Patient denies pain in calf or with ambulation.  She denies joint redness or warmth.    The patient had a vaginal hysterectomy with BSO in September 2024 for endometrial cancer followed by radiation.  Patient previously had incontinence surgery in 2008.  Patient takes Luvox, Lamictal, and Buspar for bipolar disorder. She was prescribed temazepam 15 mg for sleeplessness. She reports that Seroquel helped with insomnia more than temazepam. She often takes several temazepam sequentially so that she will sleep for 3 days. She has a history of mixed hyperlipidemia. She has GERD and takes pantoprazole as needed. She is obese with a BMI is 31.09.     ROS:  GENERAL: Patient denies fever, chills, night sweats. Patient denies weight loss.  Patient denies anorexia, fatigue, weakness or swollen glands.  SKIN: Patient denies rash or hair loss.  HEENT: Patient denies sore throat, ear pain, hearing loss, nasal congestion, or runny nose. Patient denies visual disturbance, eye irritation or discharge.  LUNGS: Patient denies wheeze or hemoptysis.  CARDIOVASCULAR: Patient denies chest pain, shortness of breath, palpitations, syncope or lower extremity edema.  GI: Patient denies abdominal pain, nausea, vomiting, diarrhea, constipation, blood in stool or melena.  GENITOURINARY: Patient denies pelvic pain, vaginal discharge, itch or odor. Patient denies irregular vaginal bleeding. Patient denies dysuria, frequency, hematuria, nocturia, urgency or incontinence.  MUSCULOSKELETAL: Patient denies joint pain, redness or warmth.   Positive for swelling in right lower extremity.  NEUROLOGIC: Patient denies headache, vertigo, paresthesias, weakness in limb, dysarthria, dysphagia or abnormality of gait.  PSYCHIATRIC: Patient denies anxiety, depression, or memory loss.     OBJECTIVE:   GENERAL: Well-developed well-nourished obese white female alert and oriented x3, in no acute distress. Memory, judgment and cognition without deficit.   SKIN: Clear without rash. Normal color and tone.   HEENT: Eyes: Clear conjunctivae. No scleral icterus.    NECK: Supple, normal range of motion.  No JVD.  LUNGS:  Delayed. Normal respiratory effort.  Delete  EXTREMITIES: Without cyanosis or clubbing.  Trace to 1+ swelling right dorsal but in ankle.  No palpable ankle or calf tenderness.  Distal pulses 2+ and equal. Normal range of motion in all extremities. No joint effusion, erythema or warmth.  NEURO:  Motor strength equal bilaterally.  Sensation without deficit.  Gait without abnormality.  No tremor.    ASSESSMENT:  1. Right ankle swelling    2. Swelling of right foot    3. Endometrial cancer    4. Mixed hyperlipidemia    5. Generalized anxiety disorder      PLAN:  1. Ibuprofen 800 mg 2-3 times daily with food.    2. Elevate lower extremity.    3. Good supportive shoes.    4. Follow-up if no improvement or worsening symptoms    20 minutes of total time spent on the encounter, which includes face to face time and non-face to face time preparing to see the patient (eg, review of tests), Obtaining and/or reviewing separately obtained history, Documenting clinical information in the electronic or other health record, Independently interpreting results (not separately reported) and communicating results to the patient/family/caregiver, or Care coordination (not separately reported).     This note is generated with speech recognition software and is subject to transcription error and sound alike phrases that may be missed by proofreading.

## 2025-05-25 NOTE — PROGRESS NOTES
`    Individual Psychotherapy (PhD/LCSW)    5/3/21    Site:  [facility]         Therapeutic Intervention: Met with patient in clinic.  Met with patient. Outpatient - Insight oriented psychotherapy 45 min - CPT code 21787 and Outpatient - Supportive psychotherapy 45 min - CPT Code 32743    Chief complaint/reason for encounter: depression, mood swings, anxiety, grief and loss, and interpersonal     Interval history and content of current session:  Late entry for 5/15/25. 65 year old female patient returned to clinic for psychotherapy. Last seen 4/25/25. Patient reported on current stressors and concerns of her own management of her emotions. Talked about family drama. Clarified she not actually  to her partner of over 30 years. Does have a mutual power of  set up with her partner. Talked about her 14 year history of alcohol abuse, which only started at age 51. She reported her alcohol pattern is cycles of binge. Stated she never drinks in moderation. She has avoided it most of the time. Most recent alcohol consumption was April 23rd, which contributed to her already baez overreaction to stressful situations. She described her partner as stubborn but also as very patient and as understanding the patient very well. Stated that is the only reason she can understand that they manage to still be together. Patient denied si/hi, psychosis, cognitive deficits, or substance abuse in the interim. Supportive therapy provided.  Scheduled for follow up 6/13/25.       Treatment plan:  Target symptoms: depression, anxiety , substance abuse, mood swings, grief, relationship tension  Why chosen therapy is appropriate versus another modality: relevant to diagnosis, patient responds to this modality  Outcome monitoring methods: self-report, observation  Therapeutic intervention type: insight oriented psychotherapy, supportive psychotherapy    Risk parameters:  Patient reports no suicidal ideation  Patient reports no  homicidal ideation  Patient reports no self-injurious behavior  Patient reports no violent behavior    Verbal deficits: None    Patient's response to intervention:  The patient's response to intervention is accepting.    Progress toward goals and other mental status changes:  The patient's progress toward goals is mixed    Diagnosis:     ICD-10-CM ICD-9-CM   1. Bipolar mixed affective disorder, moderate  F31.62 296.62   2. Anxiety  F41.9 300.00   3. Insomnia, unspecified type  G47.00 780.52   4. Family dynamics problem  Z63.9 V61.9     History of Alcohol use disorder     Plan:  individual psychotherapy and medication management by physician    Return to clinic: as scheduled    Length of Service (minutes): 48

## 2025-05-29 ENCOUNTER — OFFICE VISIT (OUTPATIENT)
Dept: FAMILY MEDICINE | Facility: CLINIC | Age: 65
End: 2025-05-29
Payer: COMMERCIAL

## 2025-05-29 ENCOUNTER — HOSPITAL ENCOUNTER (OUTPATIENT)
Dept: RADIOLOGY | Facility: HOSPITAL | Age: 65
Discharge: HOME | End: 2025-05-29
Attending: REGISTERED NURSE
Payer: COMMERCIAL

## 2025-05-29 VITALS
SYSTOLIC BLOOD PRESSURE: 140 MMHG | OXYGEN SATURATION: 97 % | HEART RATE: 92 BPM | RESPIRATION RATE: 18 BRPM | BODY MASS INDEX: 30.81 KG/M2 | DIASTOLIC BLOOD PRESSURE: 88 MMHG | HEIGHT: 67 IN | WEIGHT: 196.31 LBS | TEMPERATURE: 97 F

## 2025-05-29 DIAGNOSIS — Z87.39: ICD-10-CM

## 2025-05-29 DIAGNOSIS — R03.0 ELEVATED BLOOD PRESSURE READING IN OFFICE WITHOUT DIAGNOSIS OF HYPERTENSION: ICD-10-CM

## 2025-05-29 DIAGNOSIS — M79.89 RIGHT LEG SWELLING: ICD-10-CM

## 2025-05-29 DIAGNOSIS — M25.561 RIGHT KNEE PAIN, UNSPECIFIED CHRONICITY: ICD-10-CM

## 2025-05-29 DIAGNOSIS — M25.561 RIGHT KNEE PAIN, UNSPECIFIED CHRONICITY: Primary | ICD-10-CM

## 2025-05-29 PROCEDURE — 3077F SYST BP >= 140 MM HG: CPT | Mod: CPTII,S$GLB,, | Performed by: REGISTERED NURSE

## 2025-05-29 PROCEDURE — 3044F HG A1C LEVEL LT 7.0%: CPT | Mod: CPTII,S$GLB,, | Performed by: REGISTERED NURSE

## 2025-05-29 PROCEDURE — 1159F MED LIST DOCD IN RCRD: CPT | Mod: CPTII,S$GLB,, | Performed by: REGISTERED NURSE

## 2025-05-29 PROCEDURE — 3008F BODY MASS INDEX DOCD: CPT | Mod: CPTII,S$GLB,, | Performed by: REGISTERED NURSE

## 2025-05-29 PROCEDURE — 99999 PR PBB SHADOW E&M-EST. PATIENT-LVL IV: CPT | Mod: PBBFAC,,, | Performed by: REGISTERED NURSE

## 2025-05-29 PROCEDURE — 73560 X-RAY EXAM OF KNEE 1 OR 2: CPT | Mod: TC,FY,PO,LT

## 2025-05-29 PROCEDURE — 3079F DIAST BP 80-89 MM HG: CPT | Mod: CPTII,S$GLB,, | Performed by: REGISTERED NURSE

## 2025-05-29 PROCEDURE — 73562 X-RAY EXAM OF KNEE 3: CPT | Mod: 26,RT,, | Performed by: RADIOLOGY

## 2025-05-29 PROCEDURE — 99215 OFFICE O/P EST HI 40 MIN: CPT | Mod: S$GLB,,, | Performed by: REGISTERED NURSE

## 2025-05-29 NOTE — PROGRESS NOTES
June Stevens  MRN:  [id number]  65 y.o. female      Patient Care Team:  June Kelley MD as PCP - General (Family Medicine)  Anson Lawson NP as Nurse Practitioner (Family Medicine)  David Costa LCSW as  (Psychology)  Amor Mcelroy MD (Gynecologic Oncology)  Nabor Tanner MD as Consulting Physician (Psychiatry)  Cora Simpson MD as Consulting Physician (Obstetrics and Gynecology)      SUBJECTIVE:     CHIEF COMPLAINT:  - Right leg swelling    HPI:  Ms. Stevens reports swelling in her right leg, primarily affecting the ankle and lower leg area. The swelling began approximately 2 weeks ago and has been fluctuating in severity. She was seen by Dr. Kelley in office on 5/19/25 for right ankle and foot swelling for past 2 days. She reported at visit, a few days of right knee tightness but had resolved. No history of injury but has been exercising more in order to manage weight. Denies pain calf, tightness or heat to area. She was advised to take Motrin 800 mg 2 to 3 times daily prn w/ food.  Elevate leg with good supportive shoes.    She drinks approximately 72 ounces of water daily and has been tracking her salt intake, noting it has been high but trying to cut back where she can. Job involves sedentary work, does try to get up every hour to walk and stretch. Does not elevate legs under desk to help with swelling. Admits to taking Motrin sparingly. She engages in regular exercise with gym equipment. She noticed yesterday that one of the weight machines put extra pressure and stress on her knees. She thinks having more swelling today.    She mentions a family history of blood clots, particularly her mother.     She reports a h/o right knee problem when 18 y/o due to injury. Surgery was advised but not pursued.    She reports urinary incontinence and OAB, wishes to avoid diuretic at this time. She is in the process of getting set-up with Urology for further evaluation and  "management.      Review of Systems   Constitutional: Negative.    Respiratory: Negative.     Cardiovascular:  Positive for leg swelling. Negative for chest pain and palpitations.   Musculoskeletal:  Positive for joint pain (R knee). Negative for back pain, falls and neck pain.   Neurological:  Negative for dizziness, tingling, tremors and headaches.         Review of patient's allergies indicates:   Allergen Reactions    Meperidine Other (See Comments)     Other reaction(s): Vomiting         Problem List[1]      Current Outpatient Medications   Medication Instructions    busPIRone (BUSPAR) 30 MG Tab TAKE 1 TABLET(30 MG) BY MOUTH TWICE DAILY    fluvoxaMINE (LUVOX) 50 mg, Oral, Nightly    ibuprofen (ADVIL,MOTRIN) 800 mg, Oral, 3 times daily PRN    lamoTRIgine (LAMICTAL) 150 mg, Oral, Daily    levothyroxine (SYNTHROID) 25 mcg, Oral, Before breakfast    mirtazapine (REMERON) 15 MG tablet TAKE 1 TABLET(15 MG) BY MOUTH EVERY NIGHT AS NEEDED FOR SLEEP    pantoprazole (PROTONIX) 40 MG tablet TAKE 1 TABLET BY MOUTH EVERY MORNING 30 MINUTES BEFORE EATING MEAL         Past medical, surgical, family and social histories have been reviewed today.      OBJECTIVE:     Vitals:    05/29/25 1616   BP: (!) 140/88   Pulse: 92   Resp: 18   Temp: 97.4 °F (36.3 °C)   TempSrc: Tympanic   SpO2: 97%   Weight: 89 kg (196 lb 5.1 oz)   Height: 5' 7" (1.702 m)       Physical Exam  Vitals reviewed.   Constitutional:       General: She is not in acute distress.  HENT:      Head: Normocephalic and atraumatic.   Musculoskeletal:      Right knee: Crepitus present. No effusion. Normal range of motion. Tenderness present. Normal alignment and normal patellar mobility. Normal pulse.      Right lower leg: No tenderness. Edema (1+ pitting pre-tib, trace to ankle & foot) present.      Left lower leg: No edema.   Neurological:      Mental Status: She is alert and oriented to person, place, and time.      Sensory: No sensory deficit.      Coordination: " Coordination normal.      Gait: Gait abnormal (slightly antalgic due to knee pain & leg swelling).   Psychiatric:         Mood and Affect: Mood normal.         Behavior: Behavior normal.         Thought Content: Thought content normal.         Judgment: Judgment normal.         ASSESSMENT:     1. Right knee pain, unspecified chronicity ---- see # 3 ----- update XR today, may need MRI or Ortho evaluation  -     X-ray Knee Ortho Right (XPD); Future; Expected date: 05/29/2025    2. Right leg swelling ----- exact cause unclear ----- sedentary job, internal R knee derangement, increased sodium intake or other --- see # 1 and 3    3. History of knee derangement ----- h/o R knee injury when 20 y/o, surgery needed but not done ----- over time, and with recent increased exercise at the gym, she may have exacerbated an old knee problem to cause swelling from knee joint on down  -     X-ray Knee Ortho Right (XPD); Future; Expected date: 05/29/2025    4. Elevated blood pressure reading in office without diagnosis of hypertension ---- elevated today in office possibly due to pain and discomfort, along w/ edema.  DASH diet, stay active, healthy diet and lifestyle changes.  Monitor.      PLAN:     XR today, pending.  May consider Ortho evaluation or MRI of R knee.  RTC as directed and/or prn.        GUY Lyles  [facility]       Future Appointments   Date Time Provider Department Center   6/2/2025 12:00 PM [facility] MAMMO1 [facility] M[facility]   6/13/2025  4:00 PM David Costa LCSW [facility] PSYCH [facility]   6/16/2025 10:30 AM Lily Du MD [facility] UROLOGY [facility]   7/14/2025  8:40 AM Mark House MD [facility] CARDIO [facility]   8/8/2025  4:30 PM Nabor Tanner MD [facility] PSYCH [facility]   8/15/2025  4:00 PM Ruben Han OD [facility] OPHTHAL [facility]   9/4/2025  1:00 PM David Costa, [facility]         45 minutes of total time spent on the encounter, which  includes face to face time and non-face to face time preparing to see the patient.  This includes obtaining and/or reviewing separately obtained history, performing a medically appropriate examination and/or evaluation, and counseling and educating the patient/family/caregiver.  Includes documenting clinical information in the electronic or other health record, independently interpreting results (not separately reported) and communicating results to the patient/family/caregiver, with care coordination (not separately reported).  Medications, tests and/or procedures ordered as necessary along with referring and communicating with other health professionals (when not separately reported).         [1]   Patient Active Problem List  Diagnosis    Bipolar disorder    Generalized anxiety disorder    Mixed hyperlipidemia    GERD (gastroesophageal reflux disease)    Endometrial cancer

## 2025-05-30 ENCOUNTER — RESULTS FOLLOW-UP (OUTPATIENT)
Dept: FAMILY MEDICINE | Facility: CLINIC | Age: 65
End: 2025-05-30

## 2025-06-02 ENCOUNTER — HOSPITAL ENCOUNTER (OUTPATIENT)
Dept: RADIOLOGY | Facility: HOSPITAL | Age: 65
Discharge: HOME | End: 2025-06-02
Attending: FAMILY MEDICINE
Payer: COMMERCIAL

## 2025-06-02 DIAGNOSIS — Z12.31 ENCOUNTER FOR SCREENING MAMMOGRAM FOR MALIGNANT NEOPLASM OF BREAST: ICD-10-CM

## 2025-06-02 PROCEDURE — 77063 BREAST TOMOSYNTHESIS BI: CPT | Mod: 26,,, | Performed by: RADIOLOGY

## 2025-06-02 PROCEDURE — 77067 SCR MAMMO BI INCL CAD: CPT | Mod: TC

## 2025-06-02 PROCEDURE — 77067 SCR MAMMO BI INCL CAD: CPT | Mod: 26,,, | Performed by: RADIOLOGY

## 2025-06-03 ENCOUNTER — RESULTS FOLLOW-UP (OUTPATIENT)
Dept: FAMILY MEDICINE | Facility: CLINIC | Age: 65
End: 2025-06-03

## 2025-06-05 ENCOUNTER — OFFICE VISIT (OUTPATIENT)
Dept: FAMILY MEDICINE | Facility: CLINIC | Age: 65
End: 2025-06-05
Payer: COMMERCIAL

## 2025-06-05 VITALS
TEMPERATURE: 97 F | SYSTOLIC BLOOD PRESSURE: 134 MMHG | OXYGEN SATURATION: 98 % | WEIGHT: 191.81 LBS | BODY MASS INDEX: 30.04 KG/M2 | DIASTOLIC BLOOD PRESSURE: 80 MMHG | HEART RATE: 70 BPM

## 2025-06-05 DIAGNOSIS — M17.11 PRIMARY OSTEOARTHRITIS OF RIGHT KNEE: ICD-10-CM

## 2025-06-05 DIAGNOSIS — F41.1 GENERALIZED ANXIETY DISORDER: ICD-10-CM

## 2025-06-05 DIAGNOSIS — C54.1 ENDOMETRIAL CANCER: Chronic | ICD-10-CM

## 2025-06-05 DIAGNOSIS — M79.89 PAIN AND SWELLING OF RIGHT LOWER LEG: Primary | ICD-10-CM

## 2025-06-05 DIAGNOSIS — E78.2 MIXED HYPERLIPIDEMIA: Chronic | ICD-10-CM

## 2025-06-05 DIAGNOSIS — M79.661 PAIN AND SWELLING OF RIGHT LOWER LEG: Primary | ICD-10-CM

## 2025-06-05 PROCEDURE — 3044F HG A1C LEVEL LT 7.0%: CPT | Mod: CPTII,S$GLB,, | Performed by: FAMILY MEDICINE

## 2025-06-05 PROCEDURE — 3075F SYST BP GE 130 - 139MM HG: CPT | Mod: CPTII,S$GLB,, | Performed by: FAMILY MEDICINE

## 2025-06-05 PROCEDURE — 3079F DIAST BP 80-89 MM HG: CPT | Mod: CPTII,S$GLB,, | Performed by: FAMILY MEDICINE

## 2025-06-05 PROCEDURE — 99999 PR PBB SHADOW E&M-EST. PATIENT-LVL IV: CPT | Mod: PBBFAC,,, | Performed by: FAMILY MEDICINE

## 2025-06-05 PROCEDURE — 3008F BODY MASS INDEX DOCD: CPT | Mod: CPTII,S$GLB,, | Performed by: FAMILY MEDICINE

## 2025-06-05 PROCEDURE — 99213 OFFICE O/P EST LOW 20 MIN: CPT | Mod: S$GLB,,, | Performed by: FAMILY MEDICINE

## 2025-06-05 PROCEDURE — 1159F MED LIST DOCD IN RCRD: CPT | Mod: CPTII,S$GLB,, | Performed by: FAMILY MEDICINE

## 2025-06-05 PROCEDURE — G2211 COMPLEX E/M VISIT ADD ON: HCPCS | Mod: S$GLB,,, | Performed by: FAMILY MEDICINE

## 2025-06-06 ENCOUNTER — PATIENT MESSAGE (OUTPATIENT)
Dept: FAMILY MEDICINE | Facility: CLINIC | Age: 65
End: 2025-06-06
Payer: COMMERCIAL

## 2025-06-06 ENCOUNTER — HOSPITAL ENCOUNTER (OUTPATIENT)
Dept: RADIOLOGY | Facility: HOSPITAL | Age: 65
Discharge: HOME | End: 2025-06-06
Attending: FAMILY MEDICINE
Payer: COMMERCIAL

## 2025-06-06 DIAGNOSIS — M79.89 PAIN AND SWELLING OF RIGHT LOWER LEG: ICD-10-CM

## 2025-06-06 DIAGNOSIS — M79.661 PAIN AND SWELLING OF RIGHT LOWER LEG: ICD-10-CM

## 2025-06-06 PROCEDURE — 93971 EXTREMITY STUDY: CPT | Mod: 26,RT,, | Performed by: RADIOLOGY

## 2025-06-06 PROCEDURE — 93971 EXTREMITY STUDY: CPT | Mod: TC,RT

## 2025-06-13 ENCOUNTER — PATIENT MESSAGE (OUTPATIENT)
Dept: PSYCHIATRY | Facility: CLINIC | Age: 65
End: 2025-06-13
Payer: COMMERCIAL

## 2025-06-14 ENCOUNTER — OFFICE VISIT (OUTPATIENT)
Dept: UROLOGY | Facility: CLINIC | Age: 65
End: 2025-06-14
Payer: COMMERCIAL

## 2025-06-14 VITALS — BODY MASS INDEX: 29.72 KG/M2 | WEIGHT: 189.38 LBS | RESPIRATION RATE: 18 BRPM | HEIGHT: 67 IN

## 2025-06-14 DIAGNOSIS — N39.46 MIXED INCONTINENCE: Primary | ICD-10-CM

## 2025-06-14 DIAGNOSIS — C54.1 ENDOMETRIAL CANCER: Chronic | ICD-10-CM

## 2025-06-14 DIAGNOSIS — R32 URINARY INCONTINENCE, UNSPECIFIED TYPE: ICD-10-CM

## 2025-06-14 PROCEDURE — 1159F MED LIST DOCD IN RCRD: CPT | Mod: CPTII,S$GLB,, | Performed by: UROLOGY

## 2025-06-14 PROCEDURE — 3008F BODY MASS INDEX DOCD: CPT | Mod: CPTII,S$GLB,, | Performed by: UROLOGY

## 2025-06-14 PROCEDURE — 99204 OFFICE O/P NEW MOD 45 MIN: CPT | Mod: S$GLB,,, | Performed by: UROLOGY

## 2025-06-14 PROCEDURE — 99999 PR PBB SHADOW E&M-EST. PATIENT-LVL III: CPT | Mod: PBBFAC,,, | Performed by: UROLOGY

## 2025-06-14 PROCEDURE — 3044F HG A1C LEVEL LT 7.0%: CPT | Mod: CPTII,S$GLB,, | Performed by: UROLOGY

## 2025-06-14 RX ORDER — SOLIFENACIN SUCCINATE 5 MG/1
5 TABLET, FILM COATED ORAL DAILY
Qty: 30 TABLET | Refills: 3 | Status: SHIPPED | OUTPATIENT
Start: 2025-06-14 | End: 2026-06-14

## 2025-06-14 NOTE — PROGRESS NOTES
Chief Complaint   Patient presents with    Urinary Incontinence       Referring Provider: Dr. June Kelley      History of Present Illness:   June Stevens is a 65 y.o. female here for evaluation of Urinary Incontinence    6/14/25-66yo female presents for evaluation of urinary incontinence. Pt reports that prior to surgery last year, she was wearing a small pad for protection just in case. Had a hysterectomy and radiation for endometrial cancer approximately 9 months ago. She has to now change her pad multiple times a day due to UUI. She has tried kegels. Hasn't tried any meds. She does have KIRSTEN with cough/sneeze. UUI>KIRSTEN. Denies feelings of difficulty emptying. She reports having a bladder suspension surgery around 2007 or 2008. She believes that a mesh was used. Possibly a sling.        Review of Systems   Constitutional:  Negative for chills and fever.   Respiratory:  Negative for shortness of breath.    Cardiovascular:  Negative for chest pain.   Gastrointestinal:  Negative for abdominal pain.   All other systems reviewed and are negative.        Past Medical History:   Diagnosis Date    Anxiety     Bipolar disorder     Endometrial cancer 07/13/2024    Generalized anxiety disorder     GERD (gastroesophageal reflux disease)     Hyperlipidemia     Obesity     Thyroid disease     Urinary incontinence        Past Surgical History:   Procedure Laterality Date    CHOLECYSTECTOMY      COLONOSCOPY N/A 04/04/2022    Procedure: COLONOSCOPY;  Surgeon: Ling Mustafa MD;  Location: [facility];  Service: Endoscopy;  Laterality: N/A;    HYSTEROSCOPIC SURGICAL REMOVAL OF LEIOMYOMA WITH DILATION OF CERVIX AND CURETTAGE OF UTERUS N/A 07/10/2024    Procedure: EXCISION, LEIOMYOMA, UTERUS, WITH DILATION AND CURETTAGE OF UTERUS;  Surgeon: Cora Simpson MD;  Location: [facility] OR;  Service: OB/GYN;  Laterality: N/A;  W/MYOSURE    INCONTINENCE SURGERY  2008    LAPAROSCOPIC ASSISTED RADICAL VAGINAL HYSTERECTOMY W/ NODE BIOPSY   09/2024    with BSO    LAPAROSCOPIC SLEEVE GASTRECTOMY  08/2008    LASIK         Family History   Problem Relation Name Age of Onset    Hypertension Mother      Macular degeneration Mother      Retinal detachment Mother      COPD Mother      Heart failure Mother      Diabetes Father      No Known Problems Sister      Other Brother          Drowning    Hypertension Maternal Grandmother      Diabetes Maternal Grandmother      Heart disease Maternal Grandmother      Diabetes Mellitus Maternal Aunt         Social History[1]    Current Medications[2]    Review of patient's allergies indicates:   Allergen Reactions    Meperidine Other (See Comments)     Other reaction(s): Vomiting       Physical Exam  Vitals:    06/14/25 1113   Resp: 18     General: Well-developed, well-nourished, in no acute distress  HEENT: Normocephalic, atraumatic, extraocular movements intact  Neck: Supple, no supraclavicular or cervical lymphadenopathy, trachea midline  Respirations: even and unlabored  Back: midline spine, No CVA tenderness  Abdomen: soft, Non-tender, non-distended, no palpable masses, no rebound or guarding, well-healed suprapubic stab scars  : Normal external female genitalia without lesions. Orthotopic urethral meatus. atrophic vaginal mucosa. Well-supported anterior vaginal wall, negative cough stress test, no urethral hypermobility  Extremities: moves all equally, no clubbing, cyanosis or edema  Skin: Warm and dry. No lesions  Psych: normal affect  Neuro: Alert and oriented x 3. Cranial nerves II-XII intact    PVR: 134cc        Assessment:  1. Mixed incontinence        2. Urinary incontinence, unspecified type  Ambulatory referral/consult to Urology      3. Endometrial cancer              Plan:   Mixed incontinence    Urinary incontinence, unspecified type  -     Ambulatory referral/consult to Urology    Endometrial cancer    Other orders  -     solifenacin (VESICARE) 5 MG tablet; Take 1 tablet (5 mg total) by mouth once  daily.  Dispense: 30 tablet; Refill: 3          Follow up in about 2 months (around 8/14/2025).                 [1]   Social History  Tobacco Use    Smoking status: Never    Smokeless tobacco: Never   Substance Use Topics    Alcohol use: No     Comment: Past history of alcohol abuse    Drug use: No   [2]   Current Outpatient Medications   Medication Sig Dispense Refill    busPIRone (BUSPAR) 30 MG Tab TAKE 1 TABLET(30 MG) BY MOUTH TWICE DAILY 180 tablet 1    fluvoxaMINE (LUVOX) 50 MG Tab tablet Take 1 tablet (50 mg total) by mouth every evening. 90 tablet 1    ibuprofen (ADVIL,MOTRIN) 800 MG tablet Take 1 tablet (800 mg total) by mouth 3 (three) times daily as needed for Pain. 30 tablet 0    lamoTRIgine (LAMICTAL) 150 MG Tab Take 1 tablet (150 mg total) by mouth once daily. 90 tablet 1    mirtazapine (REMERON) 15 MG tablet TAKE 1 TABLET(15 MG) BY MOUTH EVERY NIGHT AS NEEDED FOR SLEEP 90 tablet 1    pantoprazole (PROTONIX) 40 MG tablet TAKE 1 TABLET BY MOUTH EVERY MORNING 30 MINUTES BEFORE EATING MEAL 90 tablet 2    levothyroxine (SYNTHROID) 25 MCG tablet Take 1 tablet (25 mcg total) by mouth before breakfast. 90 tablet 3    solifenacin (VESICARE) 5 MG tablet Take 1 tablet (5 mg total) by mouth once daily. 30 tablet 3     No current facility-administered medications for this visit.

## 2025-06-17 ENCOUNTER — PATIENT MESSAGE (OUTPATIENT)
Dept: FAMILY MEDICINE | Facility: CLINIC | Age: 65
End: 2025-06-17
Payer: COMMERCIAL

## 2025-06-17 RX ORDER — LEVOTHYROXINE SODIUM 25 UG/1
25 TABLET ORAL
Qty: 90 TABLET | Refills: 3 | Status: SHIPPED | OUTPATIENT
Start: 2025-06-17 | End: 2026-06-17

## 2025-06-17 NOTE — TELEPHONE ENCOUNTER
No care due was identified.  [facility] Embedded Care Due Messages. Reference number: [id number].   6/17/2025 9:28:27 AM CDT

## 2025-07-02 ENCOUNTER — PATIENT MESSAGE (OUTPATIENT)
Dept: FAMILY MEDICINE | Facility: CLINIC | Age: 65
End: 2025-07-02
Payer: COMMERCIAL

## 2025-07-02 DIAGNOSIS — M79.661 PAIN AND SWELLING OF RIGHT LOWER LEG: ICD-10-CM

## 2025-07-02 DIAGNOSIS — M17.11 PRIMARY OSTEOARTHRITIS OF RIGHT KNEE: Primary | ICD-10-CM

## 2025-07-02 DIAGNOSIS — M79.89 PAIN AND SWELLING OF RIGHT LOWER LEG: ICD-10-CM

## 2025-07-08 ENCOUNTER — PATIENT MESSAGE (OUTPATIENT)
Dept: FAMILY MEDICINE | Facility: CLINIC | Age: 65
End: 2025-07-08
Payer: COMMERCIAL

## 2025-07-09 ENCOUNTER — TELEPHONE (OUTPATIENT)
Dept: UROLOGY | Facility: CLINIC | Age: 65
End: 2025-07-09
Payer: COMMERCIAL

## 2025-07-10 ENCOUNTER — PATIENT MESSAGE (OUTPATIENT)
Dept: UROLOGY | Facility: CLINIC | Age: 65
End: 2025-07-10
Payer: COMMERCIAL

## 2025-07-10 DIAGNOSIS — Z00.00 ROUTINE HEALTH MAINTENANCE: Primary | ICD-10-CM

## 2025-07-12 ENCOUNTER — PATIENT MESSAGE (OUTPATIENT)
Dept: PSYCHIATRY | Facility: CLINIC | Age: 65
End: 2025-07-12
Payer: COMMERCIAL

## 2025-07-14 ENCOUNTER — HOSPITAL ENCOUNTER (OUTPATIENT)
Dept: CARDIOLOGY | Facility: HOSPITAL | Age: 65
Discharge: HOME | End: 2025-07-14
Attending: INTERNAL MEDICINE
Payer: COMMERCIAL

## 2025-07-14 ENCOUNTER — OFFICE VISIT (OUTPATIENT)
Dept: CARDIOLOGY | Facility: CLINIC | Age: 65
End: 2025-07-14
Payer: COMMERCIAL

## 2025-07-14 VITALS
SYSTOLIC BLOOD PRESSURE: 147 MMHG | HEIGHT: 67 IN | OXYGEN SATURATION: 99 % | BODY MASS INDEX: 29.84 KG/M2 | RESPIRATION RATE: 16 BRPM | WEIGHT: 190.13 LBS | DIASTOLIC BLOOD PRESSURE: 78 MMHG | HEART RATE: 72 BPM

## 2025-07-14 DIAGNOSIS — E78.2 MIXED HYPERLIPIDEMIA: Chronic | ICD-10-CM

## 2025-07-14 DIAGNOSIS — I10 PRIMARY HYPERTENSION: ICD-10-CM

## 2025-07-14 DIAGNOSIS — C54.1 ENDOMETRIAL/UTERINE ADENOCARCINOMA: Primary | ICD-10-CM

## 2025-07-14 DIAGNOSIS — Z00.00 ROUTINE HEALTH MAINTENANCE: ICD-10-CM

## 2025-07-14 DIAGNOSIS — F31.30 BIPOLAR AFFECTIVE DISORDER, CURRENT EPISODE DEPRESSED, CURRENT EPISODE SEVERITY UNSPECIFIED: Chronic | ICD-10-CM

## 2025-07-14 DIAGNOSIS — I51.7 LVH (LEFT VENTRICULAR HYPERTROPHY): Primary | ICD-10-CM

## 2025-07-14 DIAGNOSIS — R06.83 SNORES: ICD-10-CM

## 2025-07-14 LAB
OHS QRS DURATION: 76 MS
OHS QTC CALCULATION: 427 MS

## 2025-07-14 PROCEDURE — 93005 ELECTROCARDIOGRAM TRACING: CPT

## 2025-07-14 PROCEDURE — 99999 PR PBB SHADOW E&M-EST. PATIENT-LVL IV: CPT | Mod: PBBFAC,,, | Performed by: INTERNAL MEDICINE

## 2025-07-14 PROCEDURE — 99214 OFFICE O/P EST MOD 30 MIN: CPT | Mod: S$GLB,,, | Performed by: INTERNAL MEDICINE

## 2025-07-14 PROCEDURE — 93010 ELECTROCARDIOGRAM REPORT: CPT | Mod: ,,, | Performed by: INTERNAL MEDICINE

## 2025-07-14 PROCEDURE — 1159F MED LIST DOCD IN RCRD: CPT | Mod: CPTII,S$GLB,, | Performed by: INTERNAL MEDICINE

## 2025-07-14 PROCEDURE — 3077F SYST BP >= 140 MM HG: CPT | Mod: CPTII,S$GLB,, | Performed by: INTERNAL MEDICINE

## 2025-07-14 PROCEDURE — 3044F HG A1C LEVEL LT 7.0%: CPT | Mod: CPTII,S$GLB,, | Performed by: INTERNAL MEDICINE

## 2025-07-14 PROCEDURE — 3078F DIAST BP <80 MM HG: CPT | Mod: CPTII,S$GLB,, | Performed by: INTERNAL MEDICINE

## 2025-07-14 PROCEDURE — 1160F RVW MEDS BY RX/DR IN RCRD: CPT | Mod: CPTII,S$GLB,, | Performed by: INTERNAL MEDICINE

## 2025-07-14 PROCEDURE — 3008F BODY MASS INDEX DOCD: CPT | Mod: CPTII,S$GLB,, | Performed by: INTERNAL MEDICINE

## 2025-07-14 RX ORDER — DIAZEPAM 5 MG/1
TABLET ORAL
Qty: 2 TABLET | Refills: 0 | Status: SHIPPED | OUTPATIENT
Start: 2025-07-14

## 2025-07-14 RX ORDER — OXYCODONE AND ACETAMINOPHEN 5; 325 MG/1; MG/1
1 TABLET ORAL EVERY 6 HOURS PRN
Qty: 28 TABLET | Refills: 0 | Status: SHIPPED | OUTPATIENT
Start: 2025-07-14

## 2025-07-14 NOTE — PROGRESS NOTES
Subjective:   Patient ID:  June Stevens is a 65 y.o. female who presents for follow up of No chief complaint on file.      66 yo female, 1 yr f/u  PMH HTN LVH, HLD GERD anxiety thyroid Dz, uterine ca, no h/o MI DM and CVA and ca  No smoking and drinking  Exercise regularly   She denied chest pain, dyspnea on exertion, palpitation, fainting, PND, orthopnea, syncope and claudication.   EKG reviewed by myself today NSR and poor R progression on precordial leads.  The 10-year ASCVD risk score (Blaise VALENZUELA, et al., 2019) is: 5%    Values used to calculate the score:      Age: 64 years      Sex: Female      Is Non- : No      Diabetic: No      Tobacco smoker: No      Systolic Blood Pressure: 127 mmHg      Is BP treated: No      HDL Cholesterol: 49 mg/dL      Total Cholesterol: 223 mg/dL    Interval history  Recent recurrent uterine ca and possible lung met. Plan portal and f/u at [facility].  EKG reviewed by myself today NSR LVH and poor R progression on precordial leads.  Question DEVEN.   BP under 130 mmHG at home        History of Present Illness    CHIEF COMPLAINT:  - June presents for follow-up after being diagnosed with recurrent endometrial cancer.    HPI:  - Diagnosed with endometrial cancer 1 year ago, initially treated with radiation therapy  - Cancer has recurred in multiple locations: vaginal cuff, left side of abdominal wall, pelvis, and lower lungs as cancerous nodules  - Cancer is causing complications, compressing the ureter resulting in its closure  - Has an appointment with a urologist scheduled for the same day to have a stent placed to open the ureter  - Was referred to [facility] for cancer treatment  - Anticipates being transferred to [facility] for further care  - Will need to undergo chemotherapy  - Has a biopsy scheduled for a mass on Thursday of the current week  - Port placement scheduled to facilitate chemotherapy administration  - Mentions potential sleep  apnea symptoms, including snoring  - Denies chest pain, dyspnea, dizziness, lightheadedness, near-syncope, or palpitations  - Denies nocturnal dyspnea and having a sleep study performed    CARDIAC HISTORY:  - EKG ([id number]:40:00.000Z): mild issue, suggests enlarged heart (borderline), chronic changes  - Echo: ordered    TEST RESULTS:  - A1C: 5.4  - Glucose: Controlled  - Cholesterol LDL: 92  - Sleep study: Not completed, was recommended but she had to cancel    IMAGING:  - CT: Recent (end of last week), cancerous nodules found in the bottom of the lungs    MEDICAL HISTORY:  - Endometrial cancer: Diagnosed 1 year ago  - Recurrent cancer: Recently diagnosed, affecting vaginal cuff, left side of abdominal wall, pelvis, and lungs  - HTN: Long-standing  - Possible sleep apnea: Not formally diagnosed, but suspected    SURGICAL HISTORY:  - Full hysterectomy: 1 year ago, for endometrial cancer  - D&C: 1 year ago, led to diagnosis of endometrial cancer  - Upcoming biopsy: Scheduled for Thursday this week, to investigate a mass  - Upcoming port placement: Planned for chemotherapy administration    FAMILY HISTORY:  - Family history: HTN  - Mother: History of cardiac failure    SOCIAL HISTORY:  - Smoking: Never smoked          Past Medical History:   Diagnosis Date    Anxiety     Bipolar disorder     Endometrial cancer 07/13/2024    Generalized anxiety disorder     GERD (gastroesophageal reflux disease)     Hyperlipidemia     Obesity     Thyroid disease     Urinary incontinence        Past Surgical History:   Procedure Laterality Date    CHOLECYSTECTOMY      COLONOSCOPY N/A 04/04/2022    Procedure: COLONOSCOPY;  Surgeon: Ling Mustafa MD;  Location: [facility];  Service: Endoscopy;  Laterality: N/A;    HYSTEROSCOPIC SURGICAL REMOVAL OF LEIOMYOMA WITH DILATION OF CERVIX AND CURETTAGE OF UTERUS N/A 07/10/2024    Procedure: EXCISION, LEIOMYOMA, UTERUS, WITH DILATION AND CURETTAGE OF UTERUS;  Surgeon: Cora Simpson MD;   Location: [facility] OR;  Service: OB/GYN;  Laterality: N/A;  W/MYOSURE    INCONTINENCE SURGERY  2008    LAPAROSCOPIC ASSISTED RADICAL VAGINAL HYSTERECTOMY W/ NODE BIOPSY  09/2024    with BSO    LAPAROSCOPIC SLEEVE GASTRECTOMY  08/2008    LASIK         Social History[1]    Family History   Problem Relation Name Age of Onset    Hypertension Mother      Macular degeneration Mother      Retinal detachment Mother      COPD Mother      Heart failure Mother      Diabetes Father      No Known Problems Sister      Other Brother          Drowning    Hypertension Maternal Grandmother      Diabetes Maternal Grandmother      Heart disease Maternal Grandmother      Diabetes Mellitus Maternal Aunt           ROS    Objective:   Physical Exam  HENT:      Head: Normocephalic.   Eyes:      Pupils: Pupils are equal, round, and reactive to light.   Neck:      Thyroid: No thyromegaly.      Vascular: Normal carotid pulses. No carotid bruit or JVD.   Cardiovascular:      Rate and Rhythm: Normal rate and regular rhythm. No extrasystoles are present.     Chest Wall: PMI is not displaced.      Pulses: Normal pulses.      Heart sounds: Normal heart sounds. No murmur heard.     No gallop. No S3 sounds.   Pulmonary:      Effort: No respiratory distress.      Breath sounds: Normal breath sounds. No stridor.   Abdominal:      General: Bowel sounds are normal.      Palpations: Abdomen is soft.      Tenderness: There is no abdominal tenderness. There is no rebound.   Skin:     Findings: No rash.   Neurological:      Mental Status: She is alert and oriented to person, place, and time.   Psychiatric:         Behavior: Behavior normal.         Lab Results   Component Value Date    CHOL 158 04/25/2025    CHOL 223 (H) 04/30/2024    CHOL 211 (H) 08/18/2023     Lab Results   Component Value Date    HDL 47 04/25/2025    HDL 49 04/30/2024    HDL 68 08/18/2023     Lab Results   Component Value Date    LDLCALC 92.2 04/25/2025    LDLCALC 150.0 04/30/2024    LDLCALC  "123 08/18/2023     Lab Results   Component Value Date    TRIG 94 04/25/2025    TRIG 120 04/30/2024    TRIG 102 08/18/2023     Lab Results   Component Value Date    CHOLHDL 29.7 04/25/2025    CHOLHDL 22.0 04/30/2024    CHOLHDL 3.1 08/18/2023       Chemistry        Component Value Date/Time     (H) 04/25/2025 0856     07/03/2024 0826    K 3.8 04/25/2025 0856    K 3.9 07/03/2024 0826     (H) 04/25/2025 0856     07/03/2024 0826    CO2 29 04/25/2025 0856    CO2 25 07/03/2024 0826    BUN 15 04/25/2025 0856    CREATININE 1.0 04/25/2025 0856    GLU 91 04/25/2025 0856    GLU 88 07/03/2024 0826        Component Value Date/Time    CALCIUM 9.0 04/25/2025 0856    CALCIUM 8.8 07/03/2024 0826    ALKPHOS 94 04/25/2025 0856    ALKPHOS 125 04/30/2024 0904    AST 19 04/25/2025 0856    AST 21 04/30/2024 0904    ALT 10 04/25/2025 0856    ALT 17 04/30/2024 0904    BILITOT 0.4 04/25/2025 0856    BILITOT 0.5 04/30/2024 0904    ESTGFRAFRICA >60.0 01/31/2022 1037    EGFRNONAA >60.0 01/31/2022 1037          Lab Results   Component Value Date    HGBA1C 5.4 04/25/2025     Lab Results   Component Value Date    TSH 1.568 04/25/2025     No results found for: "INR", "PROTIME"  Lab Results   Component Value Date    WBC 5.65 04/25/2025    HGB 11.0 (L) 04/25/2025    HCT 36.3 (L) 04/25/2025    MCV 93 04/25/2025     04/25/2025     BMP  Sodium   Date Value Ref Range Status   04/25/2025 147 (H) 136 - 145 mmol/L Final   07/03/2024 143 136 - 145 mmol/L Final     Potassium   Date Value Ref Range Status   04/25/2025 3.8 3.5 - 5.1 mmol/L Final   07/03/2024 3.9 3.5 - 5.1 mmol/L Final     Chloride   Date Value Ref Range Status   04/25/2025 111 (H) 95 - 110 mmol/L Final   07/03/2024 110 95 - 110 mmol/L Final     CO2   Date Value Ref Range Status   04/25/2025 29 23 - 29 mmol/L Final   07/03/2024 25 23 - 29 mmol/L Final     BUN   Date Value Ref Range Status   04/25/2025 15 8 - 23 mg/dL Final     Creatinine   Date Value Ref Range " Status   04/25/2025 1.0 0.5 - 1.4 mg/dL Final     Calcium   Date Value Ref Range Status   04/25/2025 9.0 8.7 - 10.5 mg/dL Final   07/03/2024 8.8 8.7 - 10.5 mg/dL Final     Anion Gap   Date Value Ref Range Status   04/25/2025 7 (L) 8 - 16 mmol/L Final     eGFR if    Date Value Ref Range Status   01/31/2022 >60.0 >60 mL/min/1.73 m^2 Final     eGFR if non    Date Value Ref Range Status   01/31/2022 >60.0 >60 mL/min/1.73 m^2 Final     Comment:     Calculation used to obtain the estimated glomerular filtration  rate (eGFR) is the CKD-EPI equation.        BNP  @LABRCNTIP(BNP,BNPTRIAGEBLO)@  @LABRCNTIP(troponini)@  CrCl cannot be calculated (Patient's most recent lab result is older than the maximum 7 days allowed.).  No results found in the last 24 hours.  No results found in the last 24 hours.  No results found in the last 24 hours.    Assessment:      1. LVH (left ventricular hypertrophy)    2. Bipolar affective disorder, current episode depressed, current episode severity unspecified    3. Mixed hyperlipidemia    4. Snores    5. Primary hypertension        Plan:     Assessment & Plan    IMPRESSION:  - EKG showed mild changes, likely chronic and related to long-term HTN.  - Considered potential cardiac risks associated with chemotherapy.  - BP was elevated in-office but reportedly normal at home.    CARDIOLOGY:  - Informed patient about the small chance of heart injury from some chemotherapy medications.  - Discussed the importance of low sodium diet for heart health and patient to continue this diet.  - Ordered echocardiogram to establish baseline EF before chemotherapy.  - Contact the office if any cardiac symptoms develop during cancer treatment.    UTERINE CANCER:  - Ordered echocardiogram to establish baseline EF before chemotherapy.    SLEEP DISORDERS:  - Ordered home sleep study to evaluate for sleep apnea.  - Will refer to pulmonologist if sleep study suggests sleep  apnea.    FOLLOW-UP:  - Follow up in 6 months to ensure heart health during chemotherapy treatment.          Echo for abnl EKG and possible LVH  Home sleep study per pt's request and snore  Rtc in 6 m      This note was generated with the assistance of ambient listening technology. Verbal consent was obtained by the patient and accompanying visitor(s) for the recording of patient appointment to facilitate this note. I attest to having reviewed and edited the generated note for accuracy, though some syntax or spelling errors may persist. Please contact the author of this note for any clarification.            [1]   Social History  Tobacco Use    Smoking status: Never    Smokeless tobacco: Never   Substance Use Topics    Alcohol use: No     Comment: Past history of alcohol abuse    Drug use: No

## 2025-07-15 ENCOUNTER — TELEPHONE (OUTPATIENT)
Dept: SLEEP MEDICINE | Facility: CLINIC | Age: 65
End: 2025-07-15
Payer: COMMERCIAL

## 2025-07-15 ENCOUNTER — PATIENT MESSAGE (OUTPATIENT)
Dept: FAMILY MEDICINE | Facility: CLINIC | Age: 65
End: 2025-07-15
Payer: COMMERCIAL

## 2025-07-16 ENCOUNTER — PATIENT MESSAGE (OUTPATIENT)
Dept: FAMILY MEDICINE | Facility: CLINIC | Age: 65
End: 2025-07-16
Payer: COMMERCIAL

## 2025-07-23 ENCOUNTER — PATIENT MESSAGE (OUTPATIENT)
Dept: PSYCHIATRY | Facility: CLINIC | Age: 65
End: 2025-07-23
Payer: COMMERCIAL

## 2025-07-23 ENCOUNTER — OFFICE VISIT (OUTPATIENT)
Dept: PSYCHIATRY | Facility: CLINIC | Age: 65
End: 2025-07-23
Payer: COMMERCIAL

## 2025-07-23 DIAGNOSIS — F41.9 ANXIETY: ICD-10-CM

## 2025-07-23 DIAGNOSIS — F43.0 ACUTE REACTION TO SITUATIONAL STRESS: ICD-10-CM

## 2025-07-23 DIAGNOSIS — F31.62 BIPOLAR MIXED AFFECTIVE DISORDER, MODERATE: Primary | ICD-10-CM

## 2025-07-23 DIAGNOSIS — G47.00 INSOMNIA, UNSPECIFIED TYPE: ICD-10-CM

## 2025-07-23 PROCEDURE — 99999 PR PBB SHADOW E&M-EST. PATIENT-LVL I: CPT | Mod: PBBFAC,,, | Performed by: SOCIAL WORKER

## 2025-07-23 PROCEDURE — 3044F HG A1C LEVEL LT 7.0%: CPT | Mod: CPTII,S$GLB,, | Performed by: SOCIAL WORKER

## 2025-07-23 PROCEDURE — 90834 PSYTX W PT 45 MINUTES: CPT | Mod: S$GLB,,, | Performed by: SOCIAL WORKER

## 2025-07-28 NOTE — PROGRESS NOTES
`    Individual Psychotherapy (PhD/LCSW)    5/3/21    Site:  [facility]         Therapeutic Intervention: Met with patient in clinic.  Met with patient. Outpatient - Insight oriented psychotherapy 45 min - CPT code 51812 and Outpatient - Supportive psychotherapy 45 min - CPT Code 87458    Chief complaint/reason for encounter: depression, mood swings, anxiety, grief and loss, and interpersonal     Interval history and content of current session:  65 year old female patient returned to clinic for psychotherapy. Last seen 5/15/25. This session represented an urgent scheduling, as the patient was emotional throughout, reporting on current personal health news. She had just learned her cancer, which was in remission a few years, has come back and spread. She talked about family reaction and support. Feeling grateful her step-son's father in law is on the board of directors for [employer] in [place], and he quickly connected her with treatment coordination. She endorsed understandably feeling stressed an emotional. She said she is due to meet with [facility] in [place] this Tuesday, 7/29/25 to learn what the strategy is going to be. Plans to give full update after her [place] trip. Patient denied si/hi, psychosis, cognitive deficits, or substance abuse in the interim. Supportive therapy provided.  Scheduled for follow up 9/4/25.       Treatment plan:  Target symptoms: depression, anxiety , substance abuse, mood swings, grief, relationship tension  Why chosen therapy is appropriate versus another modality: relevant to diagnosis, patient responds to this modality  Outcome monitoring methods: self-report, observation  Therapeutic intervention type: insight oriented psychotherapy, supportive psychotherapy    Risk parameters:  Patient reports no suicidal ideation  Patient reports no homicidal ideation  Patient reports no self-injurious behavior  Patient reports no violent behavior    Verbal deficits: None    Patient's  response to intervention:  The patient's response to intervention is accepting.    Progress toward goals and other mental status changes:  The patient's progress toward goals is mixed    Diagnosis:     ICD-10-CM ICD-9-CM   1. Bipolar mixed affective disorder, moderate  F31.62 296.62   2. Anxiety  F41.9 300.00   3. Insomnia, unspecified type  G47.00 780.52   4. Acute reaction to situational stress  F43.0 308.9     History of Alcohol use disorder     Plan:  individual psychotherapy and medication management by physician    Return to clinic: as scheduled    Length of Service (minutes): 52

## 2025-07-29 ENCOUNTER — PATIENT MESSAGE (OUTPATIENT)
Dept: FAMILY MEDICINE | Facility: CLINIC | Age: 65
End: 2025-07-29
Payer: COMMERCIAL

## 2025-08-02 ENCOUNTER — PATIENT MESSAGE (OUTPATIENT)
Dept: PSYCHIATRY | Facility: CLINIC | Age: 65
End: 2025-08-02
Payer: COMMERCIAL

## 2025-08-11 ENCOUNTER — HOSPITAL ENCOUNTER (OUTPATIENT)
Dept: CARDIOLOGY | Facility: HOSPITAL | Age: 65
Discharge: HOME | End: 2025-08-11
Attending: INTERNAL MEDICINE
Payer: COMMERCIAL

## 2025-08-11 VITALS
HEIGHT: 67 IN | BODY MASS INDEX: 29.82 KG/M2 | HEART RATE: 75 BPM | DIASTOLIC BLOOD PRESSURE: 78 MMHG | SYSTOLIC BLOOD PRESSURE: 147 MMHG | WEIGHT: 190 LBS

## 2025-08-11 DIAGNOSIS — I51.7 LVH (LEFT VENTRICULAR HYPERTROPHY): ICD-10-CM

## 2025-08-11 LAB
AORTIC ROOT ANNULUS: 2.8 CM
AORTIC SIZE INDEX (SOV): 1.3 CM/M2
AORTIC SIZE INDEX: 1.5 CM/M2
ASCENDING AORTA: 3 CM
AV INDEX (PROSTH): 0.7
AV MEAN GRADIENT: 6 MMHG
AV PEAK GRADIENT: 12 MMHG
AV VALVE AREA BY VELOCITY RATIO: 1.8 CM²
AV VALVE AREA: 2.2 CM²
AV VELOCITY RATIO: 0.59
BSA FOR ECHO PROCEDURE: 2.02 M2
CV ECHO LV RWT: 0.53 CM
DOP CALC AO PEAK VEL: 1.7 M/S
DOP CALC AO VTI: 28.5 CM
DOP CALC LVOT AREA: 3.1 CM2
DOP CALC LVOT DIAMETER: 2 CM
DOP CALC LVOT PEAK VEL: 1 M/S
DOP CALC RVOT PEAK VEL: 0.76 M/S
DOP CALC RVOT VTI: 12.1 CM
DOP CALCLVOT PEAK VEL VTI: 19.9 CM
E WAVE DECELERATION TIME: 313 MSEC
E/A RATIO: 0.75
E/E' RATIO: 9 M/S
ECHO LV POSTERIOR WALL: 0.8 CM (ref 0.6–1.1)
EJECTION FRACTION: 55 %
FRACTIONAL SHORTENING: 26.7 % (ref 28–44)
INTERVENTRICULAR SEPTUM: 0.9 CM (ref 0.6–1.1)
IVRT: 114 MSEC
LA MAJOR: 4.5 CM
LA MINOR: 4.4 CM
LA WIDTH: 3.1 CM
LEFT ATRIUM AREA SYSTOLIC (APICAL 2 CHAMBER): 11.99 CM2
LEFT ATRIUM AREA SYSTOLIC (APICAL 4 CHAMBER): 11.96 CM2
LEFT ATRIUM SIZE: 2.5 CM
LEFT ATRIUM VOLUME INDEX MOD: 13 ML/M2
LEFT ATRIUM VOLUME INDEX: 15 ML/M2
LEFT ATRIUM VOLUME MOD: 25 ML
LEFT ATRIUM VOLUME: 29 CM3
LEFT INTERNAL DIMENSION IN SYSTOLE: 2.2 CM (ref 2.1–4)
LEFT VENTRICLE DIASTOLIC VOLUME INDEX: 17.68 ML/M2
LEFT VENTRICLE DIASTOLIC VOLUME: 35 ML
LEFT VENTRICLE END SYSTOLIC VOLUME APICAL 2 CHAMBER: 24.98 ML
LEFT VENTRICLE END SYSTOLIC VOLUME APICAL 4 CHAMBER: 25.04 ML
LEFT VENTRICLE MASS INDEX: 32.6 G/M2
LEFT VENTRICLE SYSTOLIC VOLUME INDEX: 7.6 ML/M2
LEFT VENTRICLE SYSTOLIC VOLUME: 15 ML
LEFT VENTRICULAR INTERNAL DIMENSION IN DIASTOLE: 3 CM (ref 3.5–6)
LEFT VENTRICULAR MASS: 64.5 G
LV LATERAL E/E' RATIO: 7.6 M/S
LV SEPTAL E/E' RATIO: 10.2 M/S
LVED V (TEICH): 34.99 ML
LVES V (TEICH): 15.3 ML
LVOT MG: 2.81 MMHG
LVOT MV: 0.81 CM/S
Lab: 1.5 CM/M
Lab: 1.8 CM/M
MV PEAK A VEL: 0.81 M/S
MV PEAK E VEL: 0.61 M/S
MV STENOSIS PRESSURE HALF TIME: 90.69 MS
MV VALVE AREA P 1/2 METHOD: 2.43 CM2
OHS CV CPX PATIENT HEIGHT IN: 67
OHS CV RV/LV RATIO: 0.93 CM
PISA TR MAX VEL: 2 M/S
PULM VEIN S/D RATIO: 2.19
PV MEAN GRADIENT: 2 MMHG
PV PEAK D VEL: 0.36 M/S
PV PEAK GRADIENT: 4 MMHG
PV PEAK S VEL: 0.79 M/S
PV PEAK VELOCITY: 1.06 M/S
RA MAJOR: 4.35 CM
RA PRESSURE ESTIMATED: 3 MMHG
RA WIDTH: 2.85 CM
RIGHT VENTRICLE DIASTOLIC BASEL DIMENSION: 2.8 CM
RIGHT VENTRICULAR END-DIASTOLIC DIMENSION: 2.84 CM
RV TB RVSP: 5 MMHG
SINUS: 2.5 CM
STJ: 2.2 CM
TDI LATERAL: 0.08 M/S
TDI SEPTAL: 0.06 M/S
TDI: 0.07 M/S
TR MAX PG: 17 MMHG
TV REST PULMONARY ARTERY PRESSURE: 19 MMHG
Z-SCORE OF LEFT VENTRICULAR DIMENSION IN END DIASTOLE: -6.45
Z-SCORE OF LEFT VENTRICULAR DIMENSION IN END SYSTOLE: -3.7

## 2025-08-11 PROCEDURE — 93306 TTE W/DOPPLER COMPLETE: CPT | Mod: 26,,, | Performed by: INTERNAL MEDICINE

## 2025-08-11 PROCEDURE — 93306 TTE W/DOPPLER COMPLETE: CPT

## 2025-08-12 ENCOUNTER — PATIENT MESSAGE (OUTPATIENT)
Dept: FAMILY MEDICINE | Facility: CLINIC | Age: 65
End: 2025-08-12
Payer: COMMERCIAL

## 2025-08-12 DIAGNOSIS — C54.1 ENDOMETRIAL/UTERINE ADENOCARCINOMA: ICD-10-CM

## 2025-08-12 RX ORDER — OXYCODONE AND ACETAMINOPHEN 5; 325 MG/1; MG/1
1 TABLET ORAL EVERY 6 HOURS PRN
Qty: 28 TABLET | Refills: 0 | Status: SHIPPED | OUTPATIENT
Start: 2025-08-12

## 2025-08-13 ENCOUNTER — RESULTS FOLLOW-UP (OUTPATIENT)
Dept: CARDIOLOGY | Facility: HOSPITAL | Age: 65
End: 2025-08-13
Payer: COMMERCIAL

## 2025-08-14 ENCOUNTER — OFFICE VISIT (OUTPATIENT)
Dept: PSYCHIATRY | Facility: CLINIC | Age: 65
End: 2025-08-14
Payer: COMMERCIAL

## 2025-08-14 DIAGNOSIS — F41.9 ANXIETY: ICD-10-CM

## 2025-08-14 DIAGNOSIS — F31.30 BIPOLAR DISORDER, MOST RECENT EPISODE DEPRESSED: Primary | ICD-10-CM

## 2025-08-14 DIAGNOSIS — G47.00 INSOMNIA, UNSPECIFIED TYPE: ICD-10-CM

## 2025-08-14 PROCEDURE — 98006 SYNCH AUDIO-VIDEO EST MOD 30: CPT | Mod: 95,,, | Performed by: PSYCHIATRY & NEUROLOGY

## 2025-08-14 PROCEDURE — 3044F HG A1C LEVEL LT 7.0%: CPT | Mod: CPTII,95,, | Performed by: PSYCHIATRY & NEUROLOGY

## 2025-08-14 PROCEDURE — 90833 PSYTX W PT W E/M 30 MIN: CPT | Mod: 95,,, | Performed by: PSYCHIATRY & NEUROLOGY

## 2025-08-14 RX ORDER — FLUVOXAMINE MALEATE 50 MG/1
50 TABLET, FILM COATED ORAL NIGHTLY
Qty: 90 TABLET | Refills: 1 | Status: SHIPPED | OUTPATIENT
Start: 2025-08-14

## 2025-08-14 RX ORDER — BUSPIRONE HYDROCHLORIDE 30 MG/1
TABLET ORAL
Qty: 180 TABLET | Refills: 1 | Status: SHIPPED | OUTPATIENT
Start: 2025-08-14

## 2025-08-14 RX ORDER — LAMOTRIGINE 150 MG/1
150 TABLET ORAL DAILY
Qty: 90 TABLET | Refills: 1 | Status: SHIPPED | OUTPATIENT
Start: 2025-08-14 | End: 2026-08-14

## 2025-08-14 RX ORDER — MIRTAZAPINE 15 MG/1
TABLET, FILM COATED ORAL
Qty: 90 TABLET | Refills: 1 | Status: SHIPPED | OUTPATIENT
Start: 2025-08-14

## 2025-08-15 ENCOUNTER — OFFICE VISIT (OUTPATIENT)
Dept: OPHTHALMOLOGY | Facility: CLINIC | Age: 65
End: 2025-08-15
Payer: COMMERCIAL

## 2025-08-15 DIAGNOSIS — H25.13 CATARACT, NUCLEAR SCLEROTIC SENILE, BILATERAL: Primary | ICD-10-CM

## 2025-08-15 DIAGNOSIS — Z98.890 HX OF LASIK: ICD-10-CM

## 2025-08-15 DIAGNOSIS — H52.7 REFRACTIVE ERRORS: ICD-10-CM

## 2025-08-15 DIAGNOSIS — Z83.518 FAMILY HISTORY OF RETINAL DETACHMENT: ICD-10-CM

## 2025-08-15 PROCEDURE — 99999 PR PBB SHADOW E&M-EST. PATIENT-LVL III: CPT | Mod: PBBFAC,,, | Performed by: OPTOMETRIST

## 2025-08-19 ENCOUNTER — OFFICE VISIT (OUTPATIENT)
Dept: DERMATOLOGY | Facility: CLINIC | Age: 65
End: 2025-08-19
Payer: COMMERCIAL

## 2025-08-19 DIAGNOSIS — I87.2 STASIS DERMATITIS: Primary | ICD-10-CM

## 2025-08-19 PROCEDURE — 99202 OFFICE O/P NEW SF 15 MIN: CPT | Mod: S$GLB,,, | Performed by: STUDENT IN AN ORGANIZED HEALTH CARE EDUCATION/TRAINING PROGRAM

## 2025-08-19 PROCEDURE — 1159F MED LIST DOCD IN RCRD: CPT | Mod: CPTII,S$GLB,, | Performed by: STUDENT IN AN ORGANIZED HEALTH CARE EDUCATION/TRAINING PROGRAM

## 2025-08-19 PROCEDURE — 3044F HG A1C LEVEL LT 7.0%: CPT | Mod: CPTII,S$GLB,, | Performed by: STUDENT IN AN ORGANIZED HEALTH CARE EDUCATION/TRAINING PROGRAM

## 2025-08-19 PROCEDURE — 99999 PR PBB SHADOW E&M-EST. PATIENT-LVL III: CPT | Mod: PBBFAC,,, | Performed by: STUDENT IN AN ORGANIZED HEALTH CARE EDUCATION/TRAINING PROGRAM

## 2025-08-19 PROCEDURE — 1160F RVW MEDS BY RX/DR IN RCRD: CPT | Mod: CPTII,S$GLB,, | Performed by: STUDENT IN AN ORGANIZED HEALTH CARE EDUCATION/TRAINING PROGRAM

## 2025-08-22 ENCOUNTER — PATIENT MESSAGE (OUTPATIENT)
Dept: DERMATOLOGY | Facility: CLINIC | Age: 65
End: 2025-08-22
Payer: COMMERCIAL

## 2025-08-29 ENCOUNTER — PATIENT MESSAGE (OUTPATIENT)
Dept: ADMINISTRATIVE | Facility: HOSPITAL | Age: 65
End: 2025-08-29
Payer: COMMERCIAL

## 2025-08-29 VITALS — SYSTOLIC BLOOD PRESSURE: 124 MMHG | DIASTOLIC BLOOD PRESSURE: 80 MMHG

## 2025-09-04 ENCOUNTER — OFFICE VISIT (OUTPATIENT)
Dept: PSYCHIATRY | Facility: CLINIC | Age: 65
End: 2025-09-04
Payer: COMMERCIAL

## 2025-09-04 DIAGNOSIS — F31.30 BIPOLAR DISORDER, MOST RECENT EPISODE DEPRESSED: Primary | ICD-10-CM

## 2025-09-04 DIAGNOSIS — F41.9 ANXIETY: ICD-10-CM

## 2025-09-04 PROCEDURE — 3044F HG A1C LEVEL LT 7.0%: CPT | Mod: CPTII,95,, | Performed by: SOCIAL WORKER

## 2025-09-04 PROCEDURE — 98007 SYNCH AUDIO-VIDEO EST HI 40: CPT | Mod: 95,,, | Performed by: SOCIAL WORKER

## (undated) DEVICE — SOL IRR NACL .9% 3000ML

## (undated) DEVICE — GOWN POLY REINF BRTH SLV XL

## (undated) DEVICE — PAD ABDOMINAL STERILE 8X10IN

## (undated) DEVICE — MANIFOLD 4 PORT

## (undated) DEVICE — UNDERGLOVE BIOGEL PI SZ 6.5 LF

## (undated) DEVICE — PACK DRAPE PERI/GYN TIBURON

## (undated) DEVICE — CONTAINER SPECIMEN OR STER 4OZ

## (undated) DEVICE — SOL NS 1000CC

## (undated) DEVICE — TOWEL OR DISP STRL BLUE 4/PK

## (undated) DEVICE — DRAPE UINDERBUT GRAD PCH

## (undated) DEVICE — COVER CAMERA OPERATING ROOM

## (undated) DEVICE — TRAY SKIN SCRUB WET PREMIUM

## (undated) DEVICE — KIT TURNOVER

## (undated) DEVICE — SOL 9P NACL IRR PIC IL

## (undated) DEVICE — DEVICE MYOSURE REACH

## (undated) DEVICE — COVER LIGHT HANDLE 80/CA

## (undated) DEVICE — PACK BASIC SETUP SC BR

## (undated) DEVICE — DRESSING TELFA N ADH 3X8

## (undated) DEVICE — GLOVE SURGICAL LATEX SZ 6

## (undated) DEVICE — PACK FLUENT DISPOSABLE

## (undated) DEVICE — CATH URETHRAL RED 16FR